# Patient Record
Sex: FEMALE | HISPANIC OR LATINO | Employment: FULL TIME | URBAN - METROPOLITAN AREA
[De-identification: names, ages, dates, MRNs, and addresses within clinical notes are randomized per-mention and may not be internally consistent; named-entity substitution may affect disease eponyms.]

---

## 2017-08-15 ENCOUNTER — HOSPITAL ENCOUNTER (EMERGENCY)
Facility: MEDICAL CENTER | Age: 37
End: 2017-08-15
Payer: COMMERCIAL

## 2017-10-26 ENCOUNTER — NON-PROVIDER VISIT (OUTPATIENT)
Dept: URGENT CARE | Facility: CLINIC | Age: 37
End: 2017-10-26

## 2017-10-26 DIAGNOSIS — Z02.1 PRE-EMPLOYMENT DRUG SCREENING: ICD-10-CM

## 2017-10-26 LAB
AMP AMPHETAMINE: NORMAL
BAR BARBITURATES: NORMAL
BZO BENZODIAZEPINES: NORMAL
COC COCAINE: NORMAL
INT CON NEG: NORMAL
INT CON POS: POSITIVE
MDMA ECSTASY: NORMAL
MET METHAMPHETAMINES: NORMAL
MTD METHADONE: NORMAL
OPI OPIATES: NORMAL
OXY OXYCODONE: NORMAL
PCP PHENCYCLIDINE: NORMAL
POC URINE DRUG SCREEN OCDRS: NEGATIVE
THC: NORMAL

## 2017-10-26 PROCEDURE — 80305 DRUG TEST PRSMV DIR OPT OBS: CPT | Performed by: NURSE PRACTITIONER

## 2018-01-04 ENCOUNTER — OFFICE VISIT (OUTPATIENT)
Dept: URGENT CARE | Facility: CLINIC | Age: 38
End: 2018-01-04
Payer: COMMERCIAL

## 2018-01-04 VITALS
DIASTOLIC BLOOD PRESSURE: 82 MMHG | SYSTOLIC BLOOD PRESSURE: 124 MMHG | BODY MASS INDEX: 34.96 KG/M2 | TEMPERATURE: 97.7 F | WEIGHT: 190 LBS | HEIGHT: 62 IN | OXYGEN SATURATION: 97 % | HEART RATE: 72 BPM | RESPIRATION RATE: 16 BRPM

## 2018-01-04 DIAGNOSIS — J40 BRONCHITIS: Primary | ICD-10-CM

## 2018-01-04 DIAGNOSIS — J01.40 ACUTE NON-RECURRENT PANSINUSITIS: ICD-10-CM

## 2018-01-04 DIAGNOSIS — J06.9 URI WITH COUGH AND CONGESTION: ICD-10-CM

## 2018-01-04 LAB
FLUAV+FLUBV AG SPEC QL IA: NEGATIVE
INT CON NEG: NORMAL
INT CON NEG: NORMAL
INT CON POS: NORMAL
INT CON POS: NORMAL
S PYO AG THROAT QL: NEGATIVE

## 2018-01-04 PROCEDURE — 87804 INFLUENZA ASSAY W/OPTIC: CPT | Performed by: PHYSICIAN ASSISTANT

## 2018-01-04 PROCEDURE — 99204 OFFICE O/P NEW MOD 45 MIN: CPT | Performed by: PHYSICIAN ASSISTANT

## 2018-01-04 PROCEDURE — 87880 STREP A ASSAY W/OPTIC: CPT | Performed by: PHYSICIAN ASSISTANT

## 2018-01-04 RX ORDER — DOXYCYCLINE HYCLATE 100 MG
100 TABLET ORAL 2 TIMES DAILY
Qty: 14 TAB | Refills: 0 | Status: SHIPPED | OUTPATIENT
Start: 2018-01-04 | End: 2018-01-11

## 2018-01-04 RX ORDER — PROMETHAZINE HYDROCHLORIDE AND CODEINE PHOSPHATE 6.25; 1 MG/5ML; MG/5ML
5 SYRUP ORAL 4 TIMES DAILY PRN
Qty: 240 ML | Refills: 0 | Status: SHIPPED | OUTPATIENT
Start: 2018-01-04 | End: 2018-01-18

## 2018-01-04 NOTE — LETTER
January 4, 2018       Patient: Mary Gold   YOB: 1980   Date of Visit: 1/4/2018         To Whom It May Concern:    It is my medical opinion that Mary Gold may be excused from work for the dates of 1/4/18-1/7/18.      If you have any questions or concerns, please don't hesitate to call [phone]          Sincerely,          Willy Bradford P.A.-C.  Electronically Signed

## 2018-01-05 NOTE — PATIENT INSTRUCTIONS
Acute Bronchitis  Bronchitis is inflammation of the airways that extend from the windpipe into the lungs (bronchi). The inflammation often causes mucus to develop. This leads to a cough, which is the most common symptom of bronchitis.   In acute bronchitis, the condition usually develops suddenly and goes away over time, usually in a couple weeks. Smoking, allergies, and asthma can make bronchitis worse. Repeated episodes of bronchitis may cause further lung problems.   CAUSES  Acute bronchitis is most often caused by the same virus that causes a cold. The virus can spread from person to person (contagious) through coughing, sneezing, and touching contaminated objects.  SIGNS AND SYMPTOMS   · Cough.    · Fever.    · Coughing up mucus.    · Body aches.    · Chest congestion.    · Chills.    · Shortness of breath.    · Sore throat.    DIAGNOSIS   Acute bronchitis is usually diagnosed through a physical exam. Your health care provider will also ask you questions about your medical history. Tests, such as chest X-rays, are sometimes done to rule out other conditions.   TREATMENT   Acute bronchitis usually goes away in a couple weeks. Oftentimes, no medical treatment is necessary. Medicines are sometimes given for relief of fever or cough. Antibiotic medicines are usually not needed but may be prescribed in certain situations. In some cases, an inhaler may be recommended to help reduce shortness of breath and control the cough. A cool mist vaporizer may also be used to help thin bronchial secretions and make it easier to clear the chest.   HOME CARE INSTRUCTIONS  · Get plenty of rest.    · Drink enough fluids to keep your urine clear or pale yellow (unless you have a medical condition that requires fluid restriction). Increasing fluids may help thin your respiratory secretions (sputum) and reduce chest congestion, and it will prevent dehydration.    · Take medicines only as directed by your health care provider.  · If  you were prescribed an antibiotic medicine, finish it all even if you start to feel better.  · Avoid smoking and secondhand smoke. Exposure to cigarette smoke or irritating chemicals will make bronchitis worse. If you are a smoker, consider using nicotine gum or skin patches to help control withdrawal symptoms. Quitting smoking will help your lungs heal faster.    · Reduce the chances of another bout of acute bronchitis by washing your hands frequently, avoiding people with cold symptoms, and trying not to touch your hands to your mouth, nose, or eyes.    · Keep all follow-up visits as directed by your health care provider.    SEEK MEDICAL CARE IF:  Your symptoms do not improve after 1 week of treatment.   SEEK IMMEDIATE MEDICAL CARE IF:  · You develop an increased fever or chills.    · You have chest pain.    · You have severe shortness of breath.  · You have bloody sputum.    · You develop dehydration.  · You faint or repeatedly feel like you are going to pass out.  · You develop repeated vomiting.  · You develop a severe headache.  MAKE SURE YOU:   · Understand these instructions.  · Will watch your condition.  · Will get help right away if you are not doing well or get worse.     This information is not intended to replace advice given to you by your health care provider. Make sure you discuss any questions you have with your health care provider.     Document Released: 01/25/2006 Document Revised: 01/08/2016 Document Reviewed: 06/10/2014  [employer] Patient Education ©2016 [employer]

## 2018-01-06 NOTE — PROGRESS NOTES
Subjective:      Pt is a 37 y.o. female who presents with Dizziness (sore throat, loss of voice, bodyaches )            HPI  PT presents to  clinic today complaining of sore throat, loss of voice, body aches, fevers, chills, watery eyes, pressure in ears, cough, fatigue, runny nose, wheezing, mild dizziness, and SOB. PT denies CP, NVD, abdominal pain, joint pain. PT states these symptoms began around 3-4 days ago. PT states the pain is a 7/10 with coughing fits, aching in nature and worse at night.  Pt has not taken any RX medications for this condition. The pt's medication list, problem list, and allergies have been evaluated and reviewed during today's visit.    PMH:  Past Medical History:   Diagnosis Date   • Chiari malformation    • Dizziness    • Fatigue    • Heart burn    • Indigestion    • Numbness and tingling in hands    • Other acute pain     headaches   • Unspecified hemorrhagic conditions        PSH:  Past Surgical History:   Procedure Laterality Date   • CRANIECTOMY  8/17/2015    Procedure: SUBOCCIPITAL CRANIECTOMY ;  Surgeon: Tenzin Curtis M.D.;  Location: SURGERY [facility];  Service:    • CERVICAL LAMINECTOMY POSTERIOR  8/17/2015    Procedure: CERVICAL LAMINECTOMY POSTERIOR C1, DUROPLASTY;  Surgeon: Tenzin Curtis M.D.;  Location: SURGERY [facility];  Service:    • GYN SURGERY     • OTHER      eye       Fam Hx:  the patient's family history is not pertinent to their current complaint      Soc HX:  Social History     Social History   • Marital status:      Spouse name: N/A   • Number of children: N/A   • Years of education: N/A     Occupational History   • Not on file.     Social History Main Topics   • Smoking status: Never Smoker   • Smokeless tobacco: Never Used   • Alcohol use Yes      Comment: occ   • Drug use: No   • Sexual activity: Not on file     Other Topics Concern   • Not on file     Social History Narrative    ** Merged History Encounter **               Medications:    Current Outpatient Prescriptions:   •  doxycycline (VIBRAMYCIN) 100 MG Tab, Take 1 Tab by mouth 2 times a day for 7 days., Disp: 14 Tab, Rfl: 0  •  promethazine-codeine (PHENERGAN-CODEINE) 6.25-10 MG/5ML Syrup, Take 5 mL by mouth 4 times a day as needed for up to 14 days., Disp: 240 mL, Rfl: 0  •  acetaminophen (TYLENOL) 325 MG Tab, Take 650 mg by mouth every four hours as needed for Mild Pain or Moderate Pain., Disp: , Rfl:   •  ondansetron (ZOFRAN) 4 MG Tab tablet, Take 4 mg by mouth every 6 hours as needed for Nausea/Vomiting., Disp: , Rfl:   •  methylPREDNISolone (MEDROL DOSPACK) 4 MG Tab, Take 4 mg by mouth every day. 6 day course. Take 6 tabs day one, then decrease by one tab daily until gone., Disp: , Rfl:   •  hydrocodone-acetaminophen (NORCO) 7.5-325 MG per tablet, Take 1-2 Tabs by mouth every 8 hours as needed (pain)., Disp: 20 Tab, Rfl: 0  •  ondansetron (ZOFRAN ODT) 4 MG TABLET DISPERSIBLE, Take 1 Tab by mouth every 8 hours as needed., Disp: 20 Tab, Rfl: 1  •  acetaminophen/caffeine/butalbital 325-40-50 mg (FIORICET) -40 MG Tab, Take 1 Tab by mouth every four hours as needed for Headache., Disp: 60 Tab, Rfl: 0      Allergies:  Patient has no known allergies.    ROS  Review of Systems   Constitutional: Positive for  malaise/fatigue. Negative for fever and diaphoresis.   HENT: Positive for congestion, ear pain and sore throat. Negative for ear discharge, hearing loss, nosebleeds and tinnitus.    Eyes: Negative for blurred vision, double vision and photophobia.   Respiratory: Positive for cough, sputum production, shortness of breath and wheezing. Negative for hemoptysis.    Cardiovascular: Negative for chest pain and palpitations.   Gastrointestinal: Negative for nausea, vomiting, abdominal pain, diarrhea and constipation.   Genitourinary: Negative for dysuria and flank pain.   Musculoskeletal: Negative for joint pain and myalgias.   Skin: Negative for itching and rash.  "  Neurological: Positive for headaches and dizziness. Negative for tingling and weakness.   Endo/Heme/Allergies: Does not bruise/bleed easily.   Psychiatric/Behavioral: Negative for depression. The patient is not nervous/anxious.             Objective:     /82   Pulse 72   Temp 36.5 °C (97.7 °F)   Resp 16   Ht 1.575 m (5' 2\")   Wt 86.2 kg (190 lb)   SpO2 97%   Breastfeeding? No   BMI 34.75 kg/m²      Physical Exam      Physical Exam   Constitutional: PT is oriented to person, place, and time. PT appears well-developed and well-nourished. No distress.   HENT:   Head: Normocephalic and atraumatic.   Right Ear: Hearing, tympanic membrane, external ear and ear canal normal.   Left Ear: Hearing, tympanic membrane, external ear and ear canal normal.   Nose: Mucosal edema, rhinorrhea and sinus tenderness present. Right sinus exhibits frontal sinus tenderness. Left sinus exhibits frontal sinus tenderness.   Mouth/Throat: Uvula is midline. Mucous membranes are pale. Posterior oropharyngeal edema and posterior oropharyngeal erythema present. No oropharyngeal exudate.   Eyes: Conjunctivae normal and EOM are normal. Pupils are equal, round, and reactive to light. Right eye exhibits no discharge. Left eye exhibits no discharge.   Neck: Normal range of motion. Neck supple. No thyromegaly present.   Cardiovascular: Normal rate, regular rhythm, normal heart sounds and intact distal pulses.  Exam reveals no gallop and no friction rub.    No murmur heard.  Pulmonary/Chest: Effort normal. No respiratory distress. PT has wheezes. PT has no rales. PT exhibits tenderness.   Abdominal: Soft. Bowel sounds are normal. PT exhibits no distension and no mass. There is no tenderness. There is no rebound and no guarding.   Musculoskeletal: Normal range of motion. PT exhibits no edema and no tenderness.   Lymphadenopathy:     PT has no cervical adenopathy.   Neurological: Pt is alert and oriented to person, place, and time. Pt has " normal reflexes. No cranial nerve deficit.   Skin: Skin is warm and dry. No rash noted. No erythema.   Psychiatric: PT has a normal mood and affect. Pt behavior is normal. Judgment and thought content normal.          Assessment/Plan:     1. Bronchitis    - doxycycline (VIBRAMYCIN) 100 MG Tab; Take 1 Tab by mouth 2 times a day for 7 days.  Dispense: 14 Tab; Refill: 0  - POCT Rapid Strep A-->NEG    2. Acute non-recurrent pansinusitis    - doxycycline (VIBRAMYCIN) 100 MG Tab; Take 1 Tab by mouth 2 times a day for 7 days.  Dispense: 14 Tab; Refill: 0    3. URI with cough and congestion    - POCT Influenza A/B-->NEG  - promethazine-codeine (PHENERGAN-CODEINE) 6.25-10 MG/5ML Syrup; Take 5 mL by mouth 4 times a day as needed for up to 14 days.  Dispense: 240 mL; Refill: 0  - POCT Rapid Strep A-->NEG    Rest, fluids encouraged.  OTC decongestant for congestion/cough  Note given for work.  AVS with medical info given.  Pt was in full understanding and agreement with the plan.  Follow-up as needed if symptoms worsen or fail to improve.

## 2018-02-21 ENCOUNTER — HOSPITAL ENCOUNTER (EMERGENCY)
Facility: MEDICAL CENTER | Age: 38
End: 2018-02-21
Attending: EMERGENCY MEDICINE

## 2018-02-21 ENCOUNTER — APPOINTMENT (OUTPATIENT)
Dept: RADIOLOGY | Facility: MEDICAL CENTER | Age: 38
End: 2018-02-21
Attending: EMERGENCY MEDICINE

## 2018-02-21 VITALS
RESPIRATION RATE: 16 BRPM | HEIGHT: 60 IN | SYSTOLIC BLOOD PRESSURE: 112 MMHG | WEIGHT: 195.77 LBS | BODY MASS INDEX: 38.43 KG/M2 | HEART RATE: 81 BPM | TEMPERATURE: 98.6 F | DIASTOLIC BLOOD PRESSURE: 74 MMHG | OXYGEN SATURATION: 96 %

## 2018-02-21 DIAGNOSIS — J98.8 VIRAL RESPIRATORY ILLNESS: ICD-10-CM

## 2018-02-21 DIAGNOSIS — B97.89 VIRAL RESPIRATORY ILLNESS: ICD-10-CM

## 2018-02-21 LAB
FLUAV+FLUBV AG SPEC QL IA: NORMAL
S PYO AG THROAT QL: NORMAL
SIGNIFICANT IND 70042: NORMAL
SIGNIFICANT IND 70042: NORMAL
SITE SITE: NORMAL
SITE SITE: NORMAL
SOURCE SOURCE: NORMAL
SOURCE SOURCE: NORMAL

## 2018-02-21 PROCEDURE — 87400 INFLUENZA A/B EACH AG IA: CPT

## 2018-02-21 PROCEDURE — A9270 NON-COVERED ITEM OR SERVICE: HCPCS | Performed by: EMERGENCY MEDICINE

## 2018-02-21 PROCEDURE — 99284 EMERGENCY DEPT VISIT MOD MDM: CPT

## 2018-02-21 PROCEDURE — 700102 HCHG RX REV CODE 250 W/ 637 OVERRIDE(OP): Performed by: EMERGENCY MEDICINE

## 2018-02-21 PROCEDURE — 71045 X-RAY EXAM CHEST 1 VIEW: CPT

## 2018-02-21 PROCEDURE — 87880 STREP A ASSAY W/OPTIC: CPT

## 2018-02-21 PROCEDURE — 87081 CULTURE SCREEN ONLY: CPT

## 2018-02-21 PROCEDURE — 700111 HCHG RX REV CODE 636 W/ 250 OVERRIDE (IP): Performed by: EMERGENCY MEDICINE

## 2018-02-21 RX ORDER — ALBUTEROL SULFATE 90 UG/1
2 AEROSOL, METERED RESPIRATORY (INHALATION) ONCE
Status: COMPLETED | OUTPATIENT
Start: 2018-02-21 | End: 2018-02-21

## 2018-02-21 RX ORDER — DEXAMETHASONE SODIUM PHOSPHATE 10 MG/ML
10 INJECTION, SOLUTION INTRAMUSCULAR; INTRAVENOUS ONCE
Status: COMPLETED | OUTPATIENT
Start: 2018-02-21 | End: 2018-02-21

## 2018-02-21 RX ADMIN — DEXAMETHASONE SODIUM PHOSPHATE 10 MG: 10 INJECTION, SOLUTION INTRAMUSCULAR; INTRAVENOUS at 21:57

## 2018-02-21 RX ADMIN — ALBUTEROL SULFATE 2 PUFF: 90 AEROSOL, METERED RESPIRATORY (INHALATION) at 22:15

## 2018-02-21 ASSESSMENT — PAIN SCALES - GENERAL: PAINLEVEL_OUTOF10: 0

## 2018-02-22 NOTE — ED NOTES
Written and verbal discharge instructions reviewed with pt and family, verbalized understanding. Vital signs WNL. Pt ambulated without difficulty accompanied by

## 2018-02-22 NOTE — ED NOTES
"Pt reports cough, congestion, \"hot sweats\", and fatigue x 2 months. Reports family members in the home recently ill with same symptoms. Reports symptoms worse in the last week. Pain to left ear, lungs cta. Aa0x3. resp nonlabored. Skin pwd.   "

## 2018-02-22 NOTE — ED PROVIDER NOTES
"ED Provider Note  CHIEF COMPLAINT  Chief Complaint   Patient presents with   • Flu Like Symptoms     for the last 2 months pt c/o HA, cough, fevers, Pt went to  on December and was diagnosed with \"bronchitis\" pt went home with antibiotics and cough medicine but states not feeling any better   • Cough     it does not let pt sleep   • Ear Pain     L ear for the last 3 days       HPI  Mary Gold is a 37 y.o. female who presents with flulike symptoms for the past 2 months that include a mild headache that presses on her temples and does not radiate and described as moderate. In addition the patient has a slight cough that is nonproductive and occasional fevers. She was seen at an urgent care in December and diagnosed with bronchitis and was sent home with antibiotics and cough medicine which did not help. The cough is making it difficult for the patient to sleep. She has been having left ear pain for the past 3 days that does not radiate and is described as mild and is associated with a sore throat. Patient says that she has no pain when moving her head around.    REVIEW OF SYSTEMS  See HPI for further details. All other systems are negative.     PAST MEDICAL HISTORY   has a past medical history of Chiari malformation; Dizziness; Fatigue; Heart burn; Indigestion; Numbness and tingling in hands; Other acute pain; and Unspecified hemorrhagic conditions.    SOCIAL HISTORY  Social History     Social History Main Topics   • Smoking status: Never Smoker   • Smokeless tobacco: Never Used   • Alcohol use Yes      Comment: occ   • Drug use: No   • Sexual activity: Not on file       SURGICAL HISTORY   has a past surgical history that includes other; craniectomy (8/17/2015); cervical laminectomy posterior (8/17/2015); and gyn surgery.    CURRENT MEDICATIONS  Home Medications     Reviewed by Steph Salcedo R.N. (Registered Nurse) on 02/21/18 at 1938  Med List Status: Complete   Medication Last Dose Status        Patient " Lc Taking any Medications                       ALLERGIES  No Known Allergies    PHYSICAL EXAM  VITAL SIGNS: /73   Pulse 82   Temp 36.3 °C (97.4 °F)   Resp 20   Ht 1.524 m (5')   Wt 88.8 kg (195 lb 12.3 oz)   LMP 01/24/2018   SpO2 96%   BMI 38.23 kg/m²  @NISHANT[[id number]::@  Pulse ox interpretation: I interpret this pulse ox as normal.  Constitutional: Alert in no apparent distress.  HENT: Normocephalic, Atraumatic, Bilateral external ears normal. Nose normal. Posterior oropharyngeal redness. Bilateral TMs normal. No meningismus and supple neck  Eyes: Pupils are equal and reactive. Conjunctiva normal, non-icteric.   Heart: Regular rate and rythm, no murmurs.    Lungs: Clear to auscultation bilaterally.  Skin: Warm, Dry, No erythema, No rash.   Neurologic: Alert, Grossly non-focal.   Psychiatric: Affect normal, Judgment normal, Mood normal, Appears appropriate and not intoxicated.     Labs Reviewed   INFLUENZA RAPID   RAPID STREP,CULT IF INDICATED   BETA STREP SCREEN (GP. A)           COURSE & MEDICAL DECISION MAKING  Pertinent Labs & Imaging studies reviewed. (See chart for details)    37 showed female who presents with viral-like symptoms. This point she has a clear exam of her chest therefore I do not believe an x-ray is indicated. We'll send an influenza as well as a rapid strep given the patient's symptoms. I will also treat her cough with albuterol and her sore throat with Decadron. There is no evidence to suggest a retropharyngeal abscess or peritonsillar abscess. Patient does not look systemically ill at this time and has no vital sign temperature patient.    The patient was found to be negative for flu and negative for strep. The inhaler has helped her significantly and the Decadron is also helped her sore throat. Strict return precautions were given to the patient and follow-up was arranged.    The patient will not drink alcohol nor drive with prescribed medications. The patient will return for  worsening symptoms and is stable at the time of discharge. The patient verbalizes understanding and will comply.    FINAL IMPRESSION  1. Viral respiratory illness              Electronically signed by: Oziel Cyr, 2/21/2018 9:44 PM

## 2018-02-22 NOTE — DISCHARGE INSTRUCTIONS

## 2018-02-22 NOTE — ED NOTES
".  Chief Complaint   Patient presents with   • Flu Like Symptoms     for the last 2 months pt c/o HA, cough, fevers, Pt went to  on December and was diagnosed with \"bronchitis\" pt went home with antibiotics and cough medicine but states not feeling any better   • Cough     it does not let pt sleep   • Ear Pain     L ear for the last 3 days     .Blood pressure 116/73, pulse 82, temperature 36.3 °C (97.4 °F), resp. rate 20, height 1.524 m (5'), weight 88.8 kg (195 lb 12.3 oz), SpO2 96 %.      "

## 2018-02-24 LAB
S PYO SPEC QL CULT: NORMAL
SIGNIFICANT IND 70042: NORMAL
SITE SITE: NORMAL
SOURCE SOURCE: NORMAL

## 2019-01-30 ENCOUNTER — OFFICE VISIT (OUTPATIENT)
Dept: URGENT CARE | Facility: CLINIC | Age: 39
End: 2019-01-30
Payer: COMMERCIAL

## 2019-01-30 VITALS
OXYGEN SATURATION: 98 % | HEIGHT: 63 IN | WEIGHT: 188 LBS | SYSTOLIC BLOOD PRESSURE: 112 MMHG | DIASTOLIC BLOOD PRESSURE: 70 MMHG | TEMPERATURE: 97.5 F | RESPIRATION RATE: 20 BRPM | BODY MASS INDEX: 33.31 KG/M2 | HEART RATE: 72 BPM

## 2019-01-30 DIAGNOSIS — R42 VERTIGO: ICD-10-CM

## 2019-01-30 DIAGNOSIS — R11.0 NAUSEA: ICD-10-CM

## 2019-01-30 PROCEDURE — 99214 OFFICE O/P EST MOD 30 MIN: CPT | Performed by: NURSE PRACTITIONER

## 2019-01-30 RX ORDER — ONDANSETRON 4 MG/1
4 TABLET, ORALLY DISINTEGRATING ORAL EVERY 8 HOURS PRN
Qty: 10 TAB | Refills: 0 | Status: SHIPPED | OUTPATIENT
Start: 2019-01-30 | End: 2019-04-07

## 2019-01-30 RX ORDER — MECLIZINE HYDROCHLORIDE 25 MG/1
25 TABLET ORAL 3 TIMES DAILY PRN
Qty: 30 TAB | Refills: 0 | Status: SHIPPED | OUTPATIENT
Start: 2019-01-30 | End: 2019-04-07

## 2019-01-30 RX ORDER — ONDANSETRON 4 MG/1
4 TABLET, ORALLY DISINTEGRATING ORAL ONCE
Status: COMPLETED | OUTPATIENT
Start: 2019-01-30 | End: 2019-01-30

## 2019-01-30 RX ADMIN — ONDANSETRON 4 MG: 4 TABLET, ORALLY DISINTEGRATING ORAL at 15:19

## 2019-01-30 ASSESSMENT — ENCOUNTER SYMPTOMS
MYALGIAS: 0
CHILLS: 0
VOMITING: 0
FATIGUE: 0
SHORTNESS OF BREATH: 0
DIZZINESS: 1
HEADACHES: 0
NUMBNESS: 0
NAUSEA: 1
ABDOMINAL PAIN: 0
WEAKNESS: 1
FEVER: 0
SORE THROAT: 0
VERTIGO: 1
EYE PAIN: 0

## 2019-01-30 NOTE — LETTER
January 30, 2019         Patient: Mary Gold   YOB: 1980   Date of Visit: 1/30/2019           To Whom it May Concern:    Mary Gold was seen in my clinic on 1/30/2019. She may return to work on 1/31/19.    If you have any questions or concerns, please don't hesitate to call.        Sincerely,           RUDI Ruffin.  Electronically Signed

## 2019-01-30 NOTE — PATIENT INSTRUCTIONS
Vertigo  Introduction  Vertigo means that you feel like you are moving when you are not. Vertigo can also make you feel like things around you are moving when they are not. This feeling can come and go at any time. Vertigo often goes away on its own. Epley maneuver   Follow these instructions at home:  · Avoid making fast movements.  · Avoid driving.  · Avoid using heavy machinery.  · Avoid doing any task or activity that might cause danger to you or other people if you would have a vertigo attack while you are doing it.  · Sit down right away if you feel dizzy or have trouble with your balance.  · Take over-the-counter and prescription medicines only as told by your doctor.  · Follow instructions from your doctor about which positions or movements you should avoid.  · Drink enough fluid to keep your pee (urine) clear or pale yellow.  · Keep all follow-up visits as told by your doctor. This is important.  Contact a doctor if:  · Medicine does not help your vertigo.  · You have a fever.  · Your problems get worse or you have new symptoms.  · Your family or friends see changes in your behavior.  · You feel sick to your stomach (nauseous) or you throw up (vomit).  · You have a “pins and needles” feeling or you are numb in part of your body.  Get help right away if:  · You have trouble moving or talking.  · You are always dizzy.  · You pass out (faint).  · You get very bad headaches.  · You feel weak or have trouble using your hands, arms, or legs.  · You have changes in your hearing.  · You have changes in your seeing (vision).  · You get a stiff neck.  · Bright light starts to bother you.  This information is not intended to replace advice given to you by your health care provider. Make sure you discuss any questions you have with your health care provider.  Document Released: 09/26/2009 Document Revised: 05/25/2017 Document Reviewed: 04/11/2016  © 2017 [employer]

## 2019-01-30 NOTE — PROGRESS NOTES
"Subjective:   Mary Gold is a 38 y.o. female who presents for Dizziness (Couple dasy loss of balance, weakness)  Patient is a [age]-year-old female presents clinic for evaluation of dizziness, feeling weak, and his room is spinning with any head-like movement times 2-3 days.  Patient does have a history of vertigo.  Patient does feel nauseous however has not vomited.  She denies any acute headache, head trauma.  Patient denies any chest pain, palpitation, shortness of breath.  Patient has attempted nothing for the symptoms.  She denies any fevers, chills, body aches, malaise.       Dizziness    This is a new problem. Episode onset: 2-3 days.  The problem occurs constantly. The problem has been unchanged. Associated symptoms include nausea, vertigo and weakness. Pertinent negatives include no abdominal pain, chest pain, chills, congestion, fatigue, fever, headaches, myalgias, numbness, rash, sore throat or vomiting. Exacerbated by: movement.  She has tried nothing for the symptoms. The treatment provided no relief.     Review of Systems   Constitutional: Negative for chills, fatigue and fever.   HENT: Negative for congestion and sore throat.    Eyes: Negative for pain.   Respiratory: Negative for shortness of breath.    Cardiovascular: Negative for chest pain.   Gastrointestinal: Positive for nausea. Negative for abdominal pain and vomiting.   Genitourinary: Negative for hematuria.   Musculoskeletal: Negative for myalgias.   Skin: Negative for rash.   Neurological: Positive for dizziness, vertigo and weakness. Negative for numbness and headaches.     No Known Allergies   Objective:   /70 (BP Location: Right arm, Patient Position: Sitting, BP Cuff Size: Adult)   Pulse 72   Temp 36.4 °C (97.5 °F) (Temporal)   Resp 20   Ht 1.6 m (5' 3\")   Wt 85.3 kg (188 lb)   LMP 01/02/2019 (Exact Date)   SpO2 98%   Breastfeeding? No   BMI 33.30 kg/m²    Physical Exam   Constitutional: She is oriented to person, place, " and time. She appears well-developed and well-nourished. No distress.   HENT:   Head: Normocephalic and atraumatic.   Right Ear: Tympanic membrane normal.   Left Ear: Tympanic membrane normal.   Nose: Nose normal. Right sinus exhibits no maxillary sinus tenderness and no frontal sinus tenderness. Left sinus exhibits no maxillary sinus tenderness and no frontal sinus tenderness.   Mouth/Throat: Uvula is midline, oropharynx is clear and moist and mucous membranes are normal. No posterior oropharyngeal edema, posterior oropharyngeal erythema or tonsillar abscesses. No tonsillar exudate.   Eyes: Pupils are equal, round, and reactive to light. Conjunctivae, EOM and lids are normal. Right eye exhibits no discharge. Left eye exhibits no discharge.   danyelle-mcnamara pike positive      Cardiovascular: Normal rate and regular rhythm.    No murmur heard.  Pulmonary/Chest: Effort normal and breath sounds normal. No respiratory distress.   Abdominal: Soft. She exhibits no distension. There is no tenderness.   Neurological: She is alert and oriented to person, place, and time. She has normal reflexes. She is not disoriented. No cranial nerve deficit or sensory deficit. GCS eye subscore is 4. GCS verbal subscore is 5. GCS motor subscore is 6.   Skin: Skin is warm, dry and intact.   Psychiatric: She has a normal mood and affect.         Assessment/Plan:     1. Vertigo  meclizine (ANTIVERT) 25 MG Tab    ondansetron (ZOFRAN ODT) 4 MG TABLET DISPERSIBLE    ondansetron (ZOFRAN ODT) dispertab 4 mg   2. Nausea       Patient is a 38 female who appears to have vertigo.  Symptoms are exacerbated with any head-like movement as the room feels as if it spinning spinning.  Hadley-Hallpike positive on exam.  Discussed Epley maneuver, education provided.  Patient given precautionary s/sx that mandate immediate follow up and evaluation in the ED. Advised of risks of not doing so.    DDX, Supportive care, and indications for immediate follow-up discussed  with patient.    Instructed to return to clinic or nearest emergency department if we are not available for any change in condition, further concerns, or worsening of symptoms.    The patient demonstrated a good understanding and agreed with the treatment plan.

## 2019-04-01 ENCOUNTER — OFFICE VISIT (OUTPATIENT)
Dept: URGENT CARE | Facility: CLINIC | Age: 39
End: 2019-04-01
Payer: COMMERCIAL

## 2019-04-01 VITALS
WEIGHT: 180 LBS | BODY MASS INDEX: 33.13 KG/M2 | DIASTOLIC BLOOD PRESSURE: 62 MMHG | HEIGHT: 62 IN | TEMPERATURE: 97.6 F | OXYGEN SATURATION: 97 % | HEART RATE: 76 BPM | RESPIRATION RATE: 18 BRPM | SYSTOLIC BLOOD PRESSURE: 98 MMHG

## 2019-04-01 DIAGNOSIS — J02.0 STREP PHARYNGITIS: ICD-10-CM

## 2019-04-01 DIAGNOSIS — Z00.00 HEALTHCARE MAINTENANCE: ICD-10-CM

## 2019-04-01 LAB
FLUAV+FLUBV AG SPEC QL IA: NORMAL
INT CON NEG: NEGATIVE
INT CON NEG: NEGATIVE
INT CON POS: POSITIVE
INT CON POS: POSITIVE
S PYO AG THROAT QL: NORMAL

## 2019-04-01 PROCEDURE — 99214 OFFICE O/P EST MOD 30 MIN: CPT | Performed by: PHYSICIAN ASSISTANT

## 2019-04-01 PROCEDURE — 87804 INFLUENZA ASSAY W/OPTIC: CPT | Performed by: PHYSICIAN ASSISTANT

## 2019-04-01 PROCEDURE — 87880 STREP A ASSAY W/OPTIC: CPT | Performed by: PHYSICIAN ASSISTANT

## 2019-04-01 RX ORDER — BENZONATATE 100 MG/1
100 CAPSULE ORAL 3 TIMES DAILY PRN
Qty: 30 CAP | Refills: 0 | Status: SHIPPED | OUTPATIENT
Start: 2019-04-01 | End: 2019-04-07

## 2019-04-01 RX ORDER — NAPROXEN SODIUM 220 MG
220 TABLET ORAL 2 TIMES DAILY WITH MEALS
COMMUNITY
End: 2019-04-11

## 2019-04-01 RX ORDER — AMOXICILLIN 875 MG/1
875 TABLET, COATED ORAL 2 TIMES DAILY
Qty: 14 TAB | Refills: 0 | Status: SHIPPED | OUTPATIENT
Start: 2019-04-01 | End: 2019-04-07

## 2019-04-01 ASSESSMENT — PAIN SCALES - GENERAL: PAINLEVEL: 6=MODERATE PAIN

## 2019-04-01 ASSESSMENT — ENCOUNTER SYMPTOMS
SORE THROAT: 1
SHORTNESS OF BREATH: 0
COUGH: 1
WEAKNESS: 1
CHILLS: 1
HEADACHES: 1
MYALGIAS: 1
FEVER: 1

## 2019-04-01 NOTE — LETTER
[facility] ServicesADALBERTO  [facility]  [address]  [place] [address]     April 1, 2019    Patient: Mary Gold   YOB: 1980   Date of Visit: 4/1/2019       To Whom It May Concern:    Mary Gold was seen and treated in our department on 4/1/2019.     Sincerely,     Kale Go, Med Ass't

## 2019-04-01 NOTE — LETTER
April 1, 2019         Patient: Mary Gold   YOB: 1980   Date of Visit: 4/1/2019           To Whom it May Concern:    Mary Gold was seen in my clinic on 4/1/2019. She can return to work 4/4/19.   Please excuse her recent absence.     If you have any questions or concerns, please don't hesitate to call.        Sincerely,           Inder Mcneil  Electronically Signed

## 2019-04-01 NOTE — PROGRESS NOTES
Subjective:   Mary Gold is a 38 y.o. female who presents today with   Chief Complaint   Patient presents with   • Sore Throat     x3 days   • Otalgia     x1 day   • Cough     x3 days   • Fatigue   • Chills       Cough   This is a new problem. Episode onset: 2 days. The problem has been unchanged. The problem occurs every few minutes. The cough is productive of sputum. Associated symptoms include chills, ear congestion, a fever, headaches, myalgias, nasal congestion and a sore throat. Pertinent negatives include no shortness of breath. Treatments tried: OTC cold and flu medicine.   Patient is also interested in following up with OBGYN due to recent spotting prior to her period. And she has been having back pain with lifting at her job. She would like a referral for this at this time as well.     PMH:  has a past medical history of Chiari malformation; Dizziness; Fatigue; Heart burn; Indigestion; Numbness and tingling in hands; Other acute pain; and Unspecified hemorrhagic conditions. She also has no past medical history of COPD or Psychiatric disorder.  MEDS:   Current Outpatient Prescriptions:   •  naproxen (FLANAX PAIN RELIEF) 220 MG tablet, Take 220 mg by mouth 2 times a day, with meals., Disp: , Rfl:   •  benzonatate (TESSALON) 100 MG Cap, Take 1 Cap by mouth 3 times a day as needed for Cough., Disp: 30 Cap, Rfl: 0  •  amoxicillin (AMOXIL) 875 MG tablet, Take 1 Tab by mouth 2 times a day for 7 days., Disp: 14 Tab, Rfl: 0  •  meclizine (ANTIVERT) 25 MG Tab, Take 1 Tab by mouth 3 times a day as needed for Vertigo. (Patient not taking: Reported on 4/1/2019), Disp: 30 Tab, Rfl: 0  •  ondansetron (ZOFRAN ODT) 4 MG TABLET DISPERSIBLE, Take 1 Tab by mouth every 8 hours as needed. (Patient not taking: Reported on 4/1/2019), Disp: 10 Tab, Rfl: 0  ALLERGIES: No Known Allergies  SURGHX:   Past Surgical History:   Procedure Laterality Date   • CRANIECTOMY  8/17/2015    Procedure: SUBOCCIPITAL CRANIECTOMY ;  Surgeon:  "Tenzin Curtis M.D.;  Location: SURGERY [facility];  Service:    • CERVICAL LAMINECTOMY POSTERIOR  8/17/2015    Procedure: CERVICAL LAMINECTOMY POSTERIOR C1, DUROPLASTY;  Surgeon: Tenzin Curtis M.D.;  Location: SURGERY [facility];  Service:    • GYN SURGERY     • OTHER      eye       SOCHX:  reports that she has never smoked. She has never used smokeless tobacco. She reports that she does not drink alcohol or use drugs.  FH: Reviewed with patient, not pertinent to this visit.       Review of Systems   Constitutional: Positive for chills, fever and malaise/fatigue.   HENT: Positive for sore throat.    Respiratory: Positive for cough. Negative for shortness of breath.    Musculoskeletal: Positive for myalgias.   Neurological: Positive for weakness and headaches.   All other systems reviewed and are negative.       Objective:   BP (!) 98/62 (BP Location: Right arm, Patient Position: Sitting, BP Cuff Size: Adult long)   Pulse 76   Temp 36.4 °C (97.6 °F) (Temporal)   Resp 18   Ht 1.575 m (5' 2\")   Wt 81.6 kg (180 lb)   LMP 02/23/2019 (Exact Date)   SpO2 97%   Breastfeeding? No   BMI 32.92 kg/m²   Physical Exam   Constitutional: She appears well-developed and well-nourished. No distress.   HENT:   Head: Normocephalic and atraumatic.   Mouth/Throat: Posterior oropharyngeal edema and posterior oropharyngeal erythema present.   Eyes: Pupils are equal, round, and reactive to light.   Neck: Neck supple.   Cardiovascular: Normal rate, regular rhythm and normal heart sounds.    Pulmonary/Chest: Effort normal and breath sounds normal.   Musculoskeletal:   Normal movement in all 4 extremities   Lymphadenopathy:     She has cervical adenopathy.        Right cervical: Superficial cervical adenopathy present.        Left cervical: Superficial cervical adenopathy present.   Neurological: She is alert. Coordination normal.   Skin: Skin is warm and dry.   Psychiatric: She has a normal mood and affect.   Nursing " note and vitals reviewed.    STREP POS  FLU NEG    Assessment/Plan:   Assessment    1. Strep pharyngitis  - POCT Influenza A/B  - benzonatate (TESSALON) 100 MG Cap; Take 1 Cap by mouth 3 times a day as needed for Cough.  Dispense: 30 Cap; Refill: 0  - amoxicillin (AMOXIL) 875 MG tablet; Take 1 Tab by mouth 2 times a day for 7 days.  Dispense: 14 Tab; Refill: 0  - POCT Rapid Strep A    2. Healthcare maintenance  - REFERRAL TO OB/GYN  - REFERRAL TO SPINE SURGERY    Other orders  - naproxen (FLANAX PAIN RELIEF) 220 MG tablet; Take 220 mg by mouth 2 times a day, with meals.  Encouraged patient to try OTC lozenges and saltwater gargles.   Differential diagnosis, natural history, supportive care, and indications for immediate follow-up discussed.   Patient given instructions and understanding of medications and treatment.    If not improving in 3-5 days, F/U with PCP or return to  if symptoms worsen.    Patient agreeable to plan.      Inder Mcneil PA-C

## 2019-04-05 ENCOUNTER — HOSPITAL ENCOUNTER (EMERGENCY)
Dept: HOSPITAL 8 - ED | Age: 39
Discharge: HOME | End: 2019-04-05
Payer: COMMERCIAL

## 2019-04-05 VITALS — BODY MASS INDEX: 33.55 KG/M2 | HEIGHT: 62 IN | WEIGHT: 182.32 LBS

## 2019-04-05 VITALS — DIASTOLIC BLOOD PRESSURE: 71 MMHG | SYSTOLIC BLOOD PRESSURE: 115 MMHG

## 2019-04-05 DIAGNOSIS — Z77.22: ICD-10-CM

## 2019-04-05 DIAGNOSIS — H65.02: Primary | ICD-10-CM

## 2019-04-05 PROCEDURE — 99283 EMERGENCY DEPT VISIT LOW MDM: CPT

## 2019-04-07 ENCOUNTER — OFFICE VISIT (OUTPATIENT)
Dept: URGENT CARE | Facility: MEDICAL CENTER | Age: 39
End: 2019-04-07
Payer: COMMERCIAL

## 2019-04-07 VITALS
DIASTOLIC BLOOD PRESSURE: 70 MMHG | OXYGEN SATURATION: 97 % | SYSTOLIC BLOOD PRESSURE: 108 MMHG | HEIGHT: 62 IN | TEMPERATURE: 97.6 F | RESPIRATION RATE: 16 BRPM | WEIGHT: 180 LBS | HEART RATE: 80 BPM | BODY MASS INDEX: 33.13 KG/M2

## 2019-04-07 DIAGNOSIS — J02.0 STREP THROAT: ICD-10-CM

## 2019-04-07 DIAGNOSIS — H66.012 ACUTE SUPPURATIVE OTITIS MEDIA OF LEFT EAR WITH SPONTANEOUS RUPTURE OF TYMPANIC MEMBRANE, RECURRENCE NOT SPECIFIED: Primary | ICD-10-CM

## 2019-04-07 PROCEDURE — 99214 OFFICE O/P EST MOD 30 MIN: CPT | Performed by: PHYSICIAN ASSISTANT

## 2019-04-07 RX ORDER — METHYLPREDNISOLONE 4 MG/1
TABLET ORAL
Qty: 21 TAB | Refills: 0 | Status: SHIPPED | OUTPATIENT
Start: 2019-04-07 | End: 2019-04-11

## 2019-04-07 RX ORDER — CLINDAMYCIN HYDROCHLORIDE 300 MG/1
300 CAPSULE ORAL 3 TIMES DAILY
Qty: 30 CAP | Refills: 0 | Status: SHIPPED | OUTPATIENT
Start: 2019-04-07 | End: 2019-04-11

## 2019-04-07 NOTE — LETTER
April 7, 2019       Patient: Mary Gold   YOB: 1980   Date of Visit: 4/7/2019         To Whom It May Concern:    It is my medical opinion that Mary Gold may be excused from work for the dates of 4/7/19-4/11/19.      If you have any questions or concerns, please don't hesitate to call [phone]          Sincerely,          Willy Bradford P.A.-C.  Electronically Signed

## 2019-04-08 NOTE — PROGRESS NOTES
Subjective:      Pt is a 38 y.o. female who presents with Otalgia ((L) x 3 days, pressure, bleeding, dizzyness)            HPI  This is a new problem. Pt was diagnosed with strep throat 6 days ago and placed on amoxicillin which she notes is not helping her throat and now has left ear pain x 3 days. PT presents to  clinic today complaining of sore throat, pressure in ears, ear drainage on left and mild dizziness. PT denies CP, SOB, NVD, abdominal pain, joint pain. PT states these symptoms began around 3 days ago. PT states the pain is a 7/10 with left ear pain, aching in nature and worse at night.  Pt has not taken any RX medications for this condition. The pt's medication list, problem list, and allergies have been evaluated and reviewed during today's visit.      PMH:  Past Medical History:   Diagnosis Date   • Chiari malformation    • Dizziness    • Fatigue    • Heart burn    • Indigestion    • Numbness and tingling in hands    • Other acute pain     headaches   • Unspecified hemorrhagic conditions        PSH:  Past Surgical History:   Procedure Laterality Date   • CRANIECTOMY  8/17/2015    Procedure: SUBOCCIPITAL CRANIECTOMY ;  Surgeon: Tenzin Curtis M.D.;  Location: SURGERY [facility];  Service:    • CERVICAL LAMINECTOMY POSTERIOR  8/17/2015    Procedure: CERVICAL LAMINECTOMY POSTERIOR C1, DUROPLASTY;  Surgeon: Tenzin Curtis M.D.;  Location: SURGERY [facility];  Service:    • GYN SURGERY     • OTHER      eye       Fam Hx:  the patient's family history is not pertinent to their current complaint      Soc HX:  Social History     Social History   • Marital status:      Spouse name: N/A   • Number of children: N/A   • Years of education: N/A     Occupational History   • Not on file.     Social History Main Topics   • Smoking status: Never Smoker   • Smokeless tobacco: Never Used   • Alcohol use No      Comment: occ   • Drug use: No   • Sexual activity: Not on file     Other Topics Concern   •  "Not on file     Social History Narrative    ** Merged History Encounter **              Medications:    Current Outpatient Prescriptions:   •  clindamycin (CLEOCIN) 300 MG Cap, Take 1 Cap by mouth 3 times a day for 10 days., Disp: 30 Cap, Rfl: 0  •  MethylPREDNISolone (MEDROL DOSEPAK) 4 MG Tablet Therapy Pack, Use as directed, Disp: 21 Tab, Rfl: 0  •  naproxen (FLANAX PAIN RELIEF) 220 MG tablet, Take 220 mg by mouth 2 times a day, with meals., Disp: , Rfl:       Allergies:  Patient has no known allergies.    ROS  Constitutional: Positive for malaise/fatigue.   HENT: Positive for congestion and sore throat. POS for LEFT ear pain.    Eyes: Negative for blurred vision, double vision and photophobia.   Respiratory:  Negative for cough, shortness of breath and wheezing.    Cardiovascular: Negative for chest pain and palpitations.   Gastrointestinal: Negative for nausea, vomiting, abdominal pain, diarrhea and constipation.   Genitourinary: Negative for dysuria and flank pain.   Musculoskeletal: Negative for falls and myalgias.   Skin: Negative for itching and rash.   Neurological: Negative for dizziness and tingling.   Endo/Heme/Allergies: Does not bruise/bleed easily.   Psychiatric/Behavioral: Negative for depression. The patient is not nervous/anxious.             Objective:     /70   Pulse 80   Temp 36.4 °C (97.6 °F)   Resp 16   Ht 1.575 m (5' 2\")   Wt 81.6 kg (180 lb)   SpO2 97%   BMI 32.92 kg/m²      Physical Exam      Constitutional: PT is oriented to person, place, and time. PT appears well-developed and well-nourished. No distress.   HENT:   Head: Normocephalic and atraumatic.   Right Ear: Hearing, tympanic membrane, external ear and ear canal normal.   Left Ear: Hearing, external ear and ear canal normal. Tympanic membrane is erythematous and bulging. A middle ear effusion is present.   Nose: Mucosal edema, rhinorrhea and sinus tenderness present. Right sinus exhibits frontal sinus tenderness. Left " sinus exhibits frontal sinus tenderness.   Mouth/Throat: Uvula is midline. Mucous membranes are pale. Posterior oropharyngeal edema and posterior oropharyngeal erythema with exudate noted on exam.   Eyes: Conjunctivae normal and EOM are normal. Pupils are equal, round, and reactive to light.   Neck: Normal range of motion. Neck supple. No thyromegaly present.   Cardiovascular: Normal rate, regular rhythm, normal heart sounds and intact distal pulses.  Exam reveals no gallop and no friction rub.    No murmur heard.  Pulmonary/Chest: Effort normal and breath sounds normal. No respiratory distress. PT has no wheezes. PT has no rales. PT exhibits no tenderness.   Abdominal: Soft. Bowel sounds are normal. PT exhibits no distension and no mass. There is no tenderness. There is no rebound and no guarding.   Musculoskeletal: Normal range of motion. PT exhibits no edema and no tenderness.   Lymphadenopathy:     PT has no cervical adenopathy.   Neurological: PT is alert and oriented to person, place, and time. PT displays normal reflexes. No cranial nerve deficit. PT exhibits normal muscle tone. Coordination normal.   Skin: Skin is warm and dry. No rash noted. No erythema.   Psychiatric: PT has a normal mood and affect. PT behavior is normal. Judgment and thought content normal.          Assessment/Plan:     1. Acute suppurative otitis media of left ear with spontaneous rupture of tympanic membrane, recurrence not specified    - clindamycin (CLEOCIN) 300 MG Cap; Take 1 Cap by mouth 3 times a day for 10 days.  Dispense: 30 Cap; Refill: 0  - MethylPREDNISolone (MEDROL DOSEPAK) 4 MG Tablet Therapy Pack; Use as directed  Dispense: 21 Tab; Refill: 0    2. Strep throat    - clindamycin (CLEOCIN) 300 MG Cap; Take 1 Cap by mouth 3 times a day for 10 days.  Dispense: 30 Cap; Refill: 0    Rest, fluids encouraged.  OTC decongestant for congestion  Note given for work.  AVS with medical info given.  Pt was in full understanding and  agreement with the plan.  Differential diagnosis, natural history, supportive care, and indications for immediate follow-up discussed. All questions answered. Patient agrees with the plan of care.  Follow-up as needed if symptoms worsen or fail to improve.

## 2019-04-11 ENCOUNTER — OFFICE VISIT (OUTPATIENT)
Dept: URGENT CARE | Facility: MEDICAL CENTER | Age: 39
End: 2019-04-11
Payer: COMMERCIAL

## 2019-04-11 VITALS
SYSTOLIC BLOOD PRESSURE: 116 MMHG | HEIGHT: 62 IN | WEIGHT: 184.6 LBS | DIASTOLIC BLOOD PRESSURE: 66 MMHG | HEART RATE: 94 BPM | BODY MASS INDEX: 33.97 KG/M2 | OXYGEN SATURATION: 96 % | TEMPERATURE: 98.1 F

## 2019-04-11 DIAGNOSIS — H93.12 RINGING IN EAR, LEFT: ICD-10-CM

## 2019-04-11 DIAGNOSIS — H66.90 ACUTE OTITIS MEDIA, UNSPECIFIED OTITIS MEDIA TYPE: ICD-10-CM

## 2019-04-11 DIAGNOSIS — R26.89 BALANCE PROBLEM: ICD-10-CM

## 2019-04-11 DIAGNOSIS — H91.92 HEARING LOSS OF LEFT EAR, UNSPECIFIED HEARING LOSS TYPE: ICD-10-CM

## 2019-04-11 DIAGNOSIS — R51.9 NONINTRACTABLE HEADACHE, UNSPECIFIED CHRONICITY PATTERN, UNSPECIFIED HEADACHE TYPE: ICD-10-CM

## 2019-04-11 PROCEDURE — 99214 OFFICE O/P EST MOD 30 MIN: CPT | Performed by: FAMILY MEDICINE

## 2019-04-11 RX ORDER — KETOROLAC TROMETHAMINE 30 MG/ML
60 INJECTION, SOLUTION INTRAMUSCULAR; INTRAVENOUS ONCE
Status: COMPLETED | OUTPATIENT
Start: 2019-04-11 | End: 2019-04-11

## 2019-04-11 RX ORDER — AMOXICILLIN AND CLAVULANATE POTASSIUM 875; 125 MG/1; MG/1
1 TABLET, FILM COATED ORAL 2 TIMES DAILY
Qty: 20 TAB | Refills: 0 | Status: SHIPPED | OUTPATIENT
Start: 2019-04-11 | End: 2019-04-21

## 2019-04-11 RX ORDER — CIPROFLOXACIN AND DEXAMETHASONE 3; 1 MG/ML; MG/ML
4 SUSPENSION/ DROPS AURICULAR (OTIC) 2 TIMES DAILY
Qty: 1 BOTTLE | Refills: 0 | Status: SHIPPED | OUTPATIENT
Start: 2019-04-11 | End: 2019-05-17

## 2019-04-11 RX ADMIN — KETOROLAC TROMETHAMINE 60 MG: 30 INJECTION, SOLUTION INTRAMUSCULAR; INTRAVENOUS at 19:16

## 2019-04-12 NOTE — PROGRESS NOTES
"Subjective:      Mary Gold is a 38 y.o. female who presents with Otalgia (infection, prev step diagnosis, lots of pressure, prev ear bleeding, dizzy, headaches, weakness)            This is a follow-up problem.  Patient has been dealing with left otitis media since beginning of April first, where she was seen first and started on amoxicillin.  Couple of days after she started the amoxicillin she started having bleeding from the ear, and that made her go to [place] emergency department on April 3 where she was seen.  She was then started on amoxicillin.  5 days later (April 7)  she was seen in the urgent care for continued pain in she was started on clindamycin and Medrol Dosepak and she has been on those for the past 5 days and there has been no improvement.  She denies any fever or chills.  She has had some headache intermittently.  She has not much hearing on the left side, has intermittent intense ringing sensation and also has noticed trouble with her balance.  She denies any change in vision, paresthesias or muscle weakness.  She has not taken any over-the-counter medication for headache.  She denies any sound or light sensitivity.          Review of Systems   All other systems reviewed and are negative.         Objective:     /66   Pulse 94   Temp 36.7 °C (98.1 °F) (Temporal)   Ht 1.575 m (5' 2\")   Wt 83.7 kg (184 lb 9.6 oz)   SpO2 96%   BMI 33.76 kg/m²      Physical Exam   Constitutional: She is oriented to person, place, and time. She appears well-developed and well-nourished.  Non-toxic appearance. No distress.   HENT:   Head: Normocephalic and atraumatic.   Right Ear: Tympanic membrane, external ear and ear canal normal.   Left Ear: External ear normal. There is swelling and tenderness. No drainage. No mastoid tenderness. Tympanic membrane is retracted. Tympanic membrane mobility is abnormal. Decreased hearing is noted.   Nose: Nose normal.   Mouth/Throat: Oropharynx is clear and moist. " No oropharyngeal exudate.   Eyes: Pupils are equal, round, and reactive to light. Conjunctivae and EOM are normal. No scleral icterus.   I did not appreciate any nystagmus   Neck: Neck supple.   Cardiovascular: Normal rate and regular rhythm.  Exam reveals no gallop and no friction rub.    No murmur heard.  Pulmonary/Chest: Effort normal. No stridor. No respiratory distress. She has no wheezes. She has no rales.   Lymphadenopathy:     She has no cervical adenopathy.   Neurological: She is alert and oriented to person, place, and time. She has normal strength. No cranial nerve deficit or sensory deficit.   Grossly normal otherwise   Skin: Skin is warm. No rash noted. She is not diaphoretic. No erythema. No pallor.   Psychiatric: She has a normal mood and affect.               Assessment/Plan:     1. Nonintractable headache, unspecified chronicity pattern, unspecified headache type  - ketorolac (TORADOL) injection 60 mg; 2 mL by Intramuscular route Once.  - REFERRAL TO ENT    2. Acute otitis media, unspecified otitis media type  - ciprofloxacin/dexamethasone (CIPRODEX) 0.3-0.1 % Suspension; Place 4 Drops in left ear 2 times a day.  Dispense: 1 Bottle; Refill: 0  - amoxicillin-clavulanate (AUGMENTIN) 875-125 MG Tab; Take 1 Tab by mouth 2 times a day for 10 days.  Dispense: 20 Tab; Refill: 0  - REFERRAL TO ENT    3. Hearing loss of left ear, unspecified hearing loss type  - REFERRAL TO ENT    4. Ringing in ear, left  - REFERRAL TO ENT    5. Balance problem  - REFERRAL TO ENT      Placed on physical findings she has still otitis media and some swelling and erythema of the left ear canal consistent with otitis externa.  Advised to stop clindamycin and Medrol Dosepak  She is to start Augmentin and Ciprodex.  We discussed using probiotic to decrease the chance of antibiotic associated diarrhea which is a possibility given oral antibiotic she has had in the past few weeks.  Stat referral to ENT  Advised her to contact or  preferably directly go to ENT with a referral I gave her so they can see her within the next couple days.  If she has any worsening or new symptoms, she should go to the nearest emergency department  She was given a Toradol IM after which her headache significantly improved.  Plan per orders and instructions  Warning signs reviewed  All her paperwork and forms for FMLA filled out.  At this point in time she cannot even drive to work until she is evaluated by ENT and significantly better.  It is unclear as to how long this is going to last but I I am guessing and hopefully sooner improvement until the end of this month,

## 2019-04-12 NOTE — PATIENT INSTRUCTIONS
Stop clindamycin and all the other medication  Start topical antibiotic drops  Start oral Augmentin twice daily for 10 days  Start taking some probiotic to decrease the chance of developing antibiotic associated  Call ENT, preferably go ASAP to the clinic so they can see you within the next couple of days  [facility] have [facility]

## 2019-05-17 ENCOUNTER — OFFICE VISIT (OUTPATIENT)
Dept: URGENT CARE | Facility: CLINIC | Age: 39
End: 2019-05-17
Payer: COMMERCIAL

## 2019-05-17 VITALS
SYSTOLIC BLOOD PRESSURE: 122 MMHG | DIASTOLIC BLOOD PRESSURE: 78 MMHG | HEIGHT: 62 IN | BODY MASS INDEX: 34.82 KG/M2 | HEART RATE: 84 BPM | OXYGEN SATURATION: 98 % | TEMPERATURE: 98.5 F | RESPIRATION RATE: 20 BRPM | WEIGHT: 189.2 LBS

## 2019-05-17 DIAGNOSIS — Z87.898 HISTORY OF BALANCE DISORDER: ICD-10-CM

## 2019-05-17 DIAGNOSIS — Z86.69 HISTORY OF ACUTE OTITIS MEDIA: ICD-10-CM

## 2019-05-17 PROCEDURE — 99214 OFFICE O/P EST MOD 30 MIN: CPT | Performed by: FAMILY MEDICINE

## 2019-05-18 NOTE — PROGRESS NOTES
"Subjective:      Mary Gold is a 38 y.o. female who presents with Otalgia (left ear injury back on 04/11/2019)            This is a follow-up.  Melody is here for follow-up on last episode where I saw her on April 11.  At that time she had continue otitis media with ringing in the ear, and balance issues.  She was referred to ENT stat and just saw ENT couple of days ago.  She is feeling almost back to normal.  She denies any trouble with balance or ringing.  She has still some residual dullness in hearing in the left ear which she was told is the best she would be at the present time.  She denies any paresthesias, change in vision, trouble walking, balance or ringing sensation.  She gave the FMLA form to the ENT physician (Dr. Iglesias) who declined to fill the forearm and advised her to come to the same doctor she saw a month ago which is me.  She would like to go back to work        Review of Systems   All other systems reviewed and are negative.         Objective:     /78 (BP Location: Left arm, Patient Position: Sitting, BP Cuff Size: Adult)   Pulse 84   Temp 36.9 °C (98.5 °F) (Temporal)   Resp 20   Ht 1.575 m (5' 2\")   Wt 85.8 kg (189 lb 3.2 oz)   SpO2 98%   BMI 34.61 kg/m²      Physical Exam   Constitutional: She is oriented to person, place, and time. She appears well-developed and well-nourished.  Non-toxic appearance. No distress.   HENT:   Head: Normocephalic and atraumatic.   Right Ear: Tympanic membrane, external ear and ear canal normal.   Left Ear: External ear and ear canal normal. No lacerations. No drainage, swelling or tenderness. No foreign bodies. No mastoid tenderness. Tympanic membrane is scarred (Slight scarring noted). Tympanic membrane is not injected, not perforated, not erythematous, not retracted and not bulging.  No middle ear effusion. No hemotympanum.   Nose: Nose normal.   Mouth/Throat: Oropharynx is clear and moist. No oropharyngeal exudate.   Eyes: Pupils are equal, " round, and reactive to light. Conjunctivae and EOM are normal.   Cardiovascular: Normal rate and regular rhythm.  Exam reveals no gallop and no friction rub.    No murmur heard.  Pulmonary/Chest: Effort normal. No stridor. No respiratory distress. She has no wheezes. She has no rales.   Neurological: She is alert and oriented to person, place, and time. She has normal strength. She displays normal reflexes. No cranial nerve deficit or sensory deficit. She exhibits normal muscle tone. She displays a negative Romberg sign. Coordination and gait normal.   Skin: Skin is warm. No rash noted. She is not diaphoretic. No erythema. No pallor.   Psychiatric: She has a normal mood and affect.             Assessment/Plan:     1. History of acute otitis media    2. History of balance disorder      Otitis media and resulting balance problem she had last month has completely resolved  Form filled and patient released to full duty with no restrictions.  Follow-up as needed

## 2019-06-15 ENCOUNTER — OFFICE VISIT (OUTPATIENT)
Dept: URGENT CARE | Facility: MEDICAL CENTER | Age: 39
End: 2019-06-15
Payer: COMMERCIAL

## 2019-06-15 VITALS
RESPIRATION RATE: 16 BRPM | DIASTOLIC BLOOD PRESSURE: 82 MMHG | HEART RATE: 87 BPM | OXYGEN SATURATION: 98 % | TEMPERATURE: 97.6 F | HEIGHT: 62 IN | BODY MASS INDEX: 33.31 KG/M2 | SYSTOLIC BLOOD PRESSURE: 126 MMHG | WEIGHT: 181 LBS

## 2019-06-15 DIAGNOSIS — J02.9 SORE THROAT: ICD-10-CM

## 2019-06-15 DIAGNOSIS — R05.9 COUGH: ICD-10-CM

## 2019-06-15 DIAGNOSIS — L29.9 ITCHY SKIN: ICD-10-CM

## 2019-06-15 PROCEDURE — 99214 OFFICE O/P EST MOD 30 MIN: CPT | Performed by: NURSE PRACTITIONER

## 2019-06-15 NOTE — LETTER
Hannah 15, 2019         Patient: Mary Gold   YOB: 1980   Date of Visit: 6/15/2019           To Whom it May Concern:    Mary Gold was seen in my clinic on 6/15/2019. Due to the nature of her symptoms, please allow patient to not work around adhesive chemicals.    If you have any questions or concerns, please don't hesitate to call.        Sincerely,           RUDI Coronel.  Electronically Signed

## 2019-06-16 ASSESSMENT — ENCOUNTER SYMPTOMS
SORE THROAT: 1
SPUTUM PRODUCTION: 0
NAUSEA: 0
STRIDOR: 0
PALPITATIONS: 0
CHILLS: 0
SHORTNESS OF BREATH: 0
COUGH: 1
VOMITING: 0
BLURRED VISION: 0
DIARRHEA: 0
HEADACHES: 0
CONSTIPATION: 0
FEVER: 0
DIZZINESS: 0
DOUBLE VISION: 0
WHEEZING: 0
ABDOMINAL PAIN: 0
MUSCULOSKELETAL NEGATIVE: 1

## 2019-06-16 NOTE — PROGRESS NOTES
"Subjective:   Mary Gold is a 38 y.o. female who presents for Pharyngitis        HPI   Patient with recurrent cough and sore throat has been intermittent over the past month. States whenever she is around adhesive at work, she develops a sore throat, mild cough, and itchy skin. States she feels like she is allergic to the adhesive. Has been taking OTC allergy medications with mild relief. She is worried that she will develop something \"wrong with her lungs.\" Denies fever or chills. Denies alleviating factors.  She is requesting work note so she cannot work around the adhesive. States she does wear a respirator at work.    Review of Systems   Constitutional: Negative for chills and fever.   HENT: Positive for congestion and sore throat. Negative for ear discharge and ear pain.    Eyes: Negative for blurred vision and double vision.   Respiratory: Positive for cough. Negative for sputum production, shortness of breath, wheezing and stridor.    Cardiovascular: Negative for chest pain and palpitations.   Gastrointestinal: Negative for abdominal pain, constipation, diarrhea, nausea and vomiting.   Musculoskeletal: Negative.    Skin: Positive for itching. Negative for rash.   Neurological: Negative for dizziness and headaches.   All other systems reviewed and are negative.    PMH:  has a past medical history of Chiari malformation; Dizziness; Fatigue; Heart burn; Indigestion; Numbness and tingling in hands; Other acute pain; and Unspecified hemorrhagic conditions. She also has no past medical history of COPD or Psychiatric disorder.  MEDS: No current outpatient prescriptions on file.  ALLERGIES: No Known Allergies  SURGHX:   Past Surgical History:   Procedure Laterality Date   • CRANIECTOMY  8/17/2015    Procedure: SUBOCCIPITAL CRANIECTOMY ;  Surgeon: Tenzin Curtis M.D.;  Location: SURGERY [facility];  Service:    • CERVICAL LAMINECTOMY POSTERIOR  8/17/2015    Procedure: CERVICAL LAMINECTOMY POSTERIOR C1, " "DUROPLASTY;  Surgeon: Tenzin Curtis M.D.;  Location: SURGERY [facility];  Service:    • GYN SURGERY     • OTHER      eye     SOCHX:  reports that she has never smoked. She has never used smokeless tobacco. She reports that she does not drink alcohol or use drugs.  FH: Family history was reviewed, no pertinent findings to report     Objective:   /82 (BP Location: Right arm, Patient Position: Sitting, BP Cuff Size: Adult)   Pulse 87   Temp 36.4 °C (97.6 °F) (Temporal)   Resp 16   Ht 1.575 m (5' 2.01\")   Wt 82.1 kg (181 lb)   SpO2 98%   BMI 33.10 kg/m²   Physical Exam   Constitutional: She is oriented to person, place, and time. She appears well-developed and well-nourished. No distress.   HENT:   Head: Normocephalic.   Right Ear: Hearing, tympanic membrane and ear canal normal. Tympanic membrane is not erythematous. No middle ear effusion.   Left Ear: Hearing, tympanic membrane and ear canal normal. Tympanic membrane is not erythematous.  No middle ear effusion.   Nose: No rhinorrhea. Right sinus exhibits no maxillary sinus tenderness and no frontal sinus tenderness. Left sinus exhibits no maxillary sinus tenderness and no frontal sinus tenderness.   Mouth/Throat: Uvula is midline and mucous membranes are normal. Posterior oropharyngeal erythema present. Tonsils are 0 on the right. Tonsils are 0 on the left. No tonsillar exudate.   Mild erythema, post nasal drip   Eyes: Pupils are equal, round, and reactive to light. Conjunctivae, EOM and lids are normal.   Neck: Normal range of motion. No thyromegaly present.   Cardiovascular: Normal rate, regular rhythm and normal heart sounds.    Pulmonary/Chest: Effort normal and breath sounds normal. No respiratory distress. She has no wheezes.   Lymphadenopathy:        Head (right side): No submandibular and no tonsillar adenopathy present.        Head (left side): No submandibular and no tonsillar adenopathy present.   Neurological: She is alert and oriented " to person, place, and time.   Skin: Skin is warm and dry. No abrasion, no bruising, no purpura and no rash noted. Rash is not nodular, not pustular and not urticarial. She is not diaphoretic.   Psychiatric: She has a normal mood and affect. Her speech is normal and behavior is normal. Judgment and thought content normal.   Vitals reviewed.        Assessment/Plan:   Assessment    1. Sore throat    2. Itchy skin    3. Cough    We discussed during this visit that I believe she needs to open a worker's comp visit since she states she does not want to be around the adhesive at work. Patient states that she would just like a note stating that she should not be around the adhesives at work. I advised her that typically we consider this possibly Worker's Comp and she states she does not want to open a case at this time.  Continue with OTC allergy medications as prior.    Differential diagnosis, natural history, supportive care, and indications for immediate follow-up discussed.

## 2019-09-17 ENCOUNTER — OFFICE VISIT (OUTPATIENT)
Dept: URGENT CARE | Facility: MEDICAL CENTER | Age: 39
End: 2019-09-17
Payer: COMMERCIAL

## 2019-09-17 VITALS
OXYGEN SATURATION: 97 % | HEIGHT: 62 IN | TEMPERATURE: 97.1 F | DIASTOLIC BLOOD PRESSURE: 64 MMHG | BODY MASS INDEX: 32.2 KG/M2 | SYSTOLIC BLOOD PRESSURE: 106 MMHG | HEART RATE: 64 BPM | WEIGHT: 175 LBS

## 2019-09-17 DIAGNOSIS — H10.022 PINK EYE DISEASE OF LEFT EYE: ICD-10-CM

## 2019-09-17 PROCEDURE — 99213 OFFICE O/P EST LOW 20 MIN: CPT | Performed by: FAMILY MEDICINE

## 2019-09-17 RX ORDER — NEOMYCIN POLYMYXIN B SULFATES AND DEXAMETHASONE 3.5; 10000; 1 MG/ML; [USP'U]/ML; MG/ML
2 SUSPENSION/ DROPS OPHTHALMIC 3 TIMES DAILY
Qty: 1 BOTTLE | Refills: 0 | Status: SHIPPED | OUTPATIENT
Start: 2019-09-17 | End: 2019-09-22

## 2019-09-17 ASSESSMENT — ENCOUNTER SYMPTOMS: EYE REDNESS: 1

## 2019-09-18 NOTE — PROGRESS NOTES
"Subjective:      Mary Gold is a 38 y.o. female who presents with Conjunctivitis (L eye, started last week but now its getting itchy, makes vision blurry sometimes)      - This is a pleasant and non toxic appearing 38 y.o. female with c/o Lt eye red x 4-5 days, irritated and a little crusty at times. No trauma, vision change or contact lens use.             ALLERGIES:  Patient has no known allergies.     PMH:  Past Medical History:   Diagnosis Date   • Chiari malformation    • Dizziness    • Fatigue    • Heart burn    • Indigestion    • Numbness and tingling in hands    • Other acute pain     headaches   • Unspecified hemorrhagic conditions         PSH:  Past Surgical History:   Procedure Laterality Date   • CRANIECTOMY  8/17/2015    Procedure: SUBOCCIPITAL CRANIECTOMY ;  Surgeon: Tenzin Curtis M.D.;  Location: SURGERY [facility];  Service:    • CERVICAL LAMINECTOMY POSTERIOR  8/17/2015    Procedure: CERVICAL LAMINECTOMY POSTERIOR C1, DUROPLASTY;  Surgeon: Tenzin Crutis M.D.;  Location: SURGERY [facility];  Service:    • GYN SURGERY     • OTHER      eye       MEDS:    Current Outpatient Medications:   •  neomycin-polymyxin-dexamethasone (MAXITROL) 0.1 % ophthalmic suspension, Place 2 Drops in left eye 3 times a day for 5 days., Disp: 1 Bottle, Rfl: 0    ** I have documented what I find to be significant in regards to past medical, social, family and surgical history  in my HPI or under PMH/PSH/FH review section, otherwise it is contributory **           HPI    Review of Systems   Eyes: Positive for redness.   All other systems reviewed and are negative.         Objective:     /64   Pulse 64   Temp 36.2 °C (97.1 °F) (Temporal)   Ht 1.575 m (5' 2\")   Wt 79.4 kg (175 lb)   SpO2 97%   BMI 32.01 kg/m²      Physical Exam   Constitutional: She appears well-developed and well-nourished. No distress.   HENT:   Head: Normocephalic and atraumatic.   Cardiovascular: Normal heart sounds.   No murmur " heard.  Pulmonary/Chest: Effort normal. No respiratory distress.   Neurological: She is alert. She exhibits normal muscle tone.   Skin: Skin is warm and dry. She is not diaphoretic.   Psychiatric: She has a normal mood and affect. Judgment normal.   Nursing note and vitals reviewed.    Lt eye: medial bulbar conjunctiva injected, + clear DC. No gross fb/abrasions           Assessment/Plan:         1. Pink eye disease of left eye  neomycin-polymyxin-dexamethasone (MAXITROL) 0.1 % ophthalmic suspension       - rest/hydrate       Dx & d/c instructions discussed w/ patient and/or family members.     Follow up with PCP (or here if PCP unavailable) in 2-3 days if symptoms not improving, ER if feeling/getting worse.    Any realistic and/or common medication side effects that may have been given today(i.e. Rash, GI upset/constipation, sedation, elevation of BP or blood sugars) reviewed.     Patient left in stable condition

## 2019-10-21 ENCOUNTER — OFFICE VISIT (OUTPATIENT)
Dept: URGENT CARE | Facility: MEDICAL CENTER | Age: 39
End: 2019-10-21
Payer: COMMERCIAL

## 2019-10-21 VITALS
HEIGHT: 62 IN | OXYGEN SATURATION: 95 % | SYSTOLIC BLOOD PRESSURE: 118 MMHG | HEART RATE: 88 BPM | DIASTOLIC BLOOD PRESSURE: 70 MMHG | WEIGHT: 175 LBS | TEMPERATURE: 97.5 F | BODY MASS INDEX: 32.2 KG/M2 | RESPIRATION RATE: 13 BRPM

## 2019-10-21 DIAGNOSIS — R05.9 COUGH: ICD-10-CM

## 2019-10-21 DIAGNOSIS — J22 ACUTE RESPIRATORY INFECTION: ICD-10-CM

## 2019-10-21 PROCEDURE — 99214 OFFICE O/P EST MOD 30 MIN: CPT | Performed by: NURSE PRACTITIONER

## 2019-10-21 RX ORDER — AZITHROMYCIN 250 MG/1
TABLET, FILM COATED ORAL
Qty: 6 TAB | Refills: 0 | Status: SHIPPED | OUTPATIENT
Start: 2019-10-21 | End: 2020-07-14

## 2019-10-21 RX ORDER — BENZONATATE 200 MG/1
200 CAPSULE ORAL 3 TIMES DAILY PRN
Qty: 40 CAP | Refills: 0 | Status: SHIPPED | OUTPATIENT
Start: 2019-10-21 | End: 2020-07-14

## 2019-10-21 RX ORDER — CODEINE PHOSPHATE AND GUAIFENESIN 10; 100 MG/5ML; MG/5ML
5 SOLUTION ORAL
Qty: 120 ML | Refills: 0 | Status: SHIPPED | OUTPATIENT
Start: 2019-10-21 | End: 2019-10-28

## 2019-10-21 ASSESSMENT — ENCOUNTER SYMPTOMS
HEADACHES: 1
CHILLS: 1
MYALGIAS: 0
HEARTBURN: 0
FEVER: 0
WHEEZING: 0
RHINORRHEA: 0
COUGH: 1
SWEATS: 0
WEIGHT LOSS: 0
SORE THROAT: 1
SHORTNESS OF BREATH: 0

## 2019-10-21 NOTE — LETTER
October 21, 2019       Patient: Mary Gold   YOB: 1980   Date of Visit: 10/21/2019         To Whom It May Concern:    It is my medical opinion that Mary Gold return to full duty, no restrictions on 10/22/19 due to a medical illness.            Sincerely,          PARVIN Larios  Electronically Signed

## 2020-01-14 ENCOUNTER — HOSPITAL ENCOUNTER (EMERGENCY)
Facility: MEDICAL CENTER | Age: 40
End: 2020-01-14
Attending: EMERGENCY MEDICINE
Payer: COMMERCIAL

## 2020-01-14 VITALS
SYSTOLIC BLOOD PRESSURE: 105 MMHG | BODY MASS INDEX: 33.23 KG/M2 | RESPIRATION RATE: 16 BRPM | HEIGHT: 62 IN | HEART RATE: 74 BPM | DIASTOLIC BLOOD PRESSURE: 66 MMHG | OXYGEN SATURATION: 96 % | TEMPERATURE: 97.7 F | WEIGHT: 180.56 LBS

## 2020-01-14 DIAGNOSIS — R42 VERTIGO: ICD-10-CM

## 2020-01-14 PROCEDURE — A9270 NON-COVERED ITEM OR SERVICE: HCPCS | Performed by: EMERGENCY MEDICINE

## 2020-01-14 PROCEDURE — 99284 EMERGENCY DEPT VISIT MOD MDM: CPT

## 2020-01-14 PROCEDURE — 700102 HCHG RX REV CODE 250 W/ 637 OVERRIDE(OP): Performed by: EMERGENCY MEDICINE

## 2020-01-14 RX ORDER — PROMETHAZINE HYDROCHLORIDE 25 MG/1
25 TABLET ORAL EVERY 6 HOURS PRN
Qty: 10 TAB | Refills: 0 | Status: SHIPPED | OUTPATIENT
Start: 2020-01-14 | End: 2020-07-14

## 2020-01-14 RX ORDER — MECLIZINE HYDROCHLORIDE 25 MG/1
25 TABLET ORAL 3 TIMES DAILY PRN
Qty: 30 TAB | Refills: 0 | Status: SHIPPED | OUTPATIENT
Start: 2020-01-14 | End: 2020-07-14

## 2020-01-14 RX ORDER — MECLIZINE HYDROCHLORIDE 25 MG/1
25 TABLET ORAL ONCE
Status: COMPLETED | OUTPATIENT
Start: 2020-01-14 | End: 2020-01-14

## 2020-01-14 RX ADMIN — MECLIZINE HYDROCHLORIDE 25 MG: 25 TABLET ORAL at 18:15

## 2020-01-15 NOTE — ED PROVIDER NOTES
"ED Provider Note    CHIEF COMPLAINT  Chief Complaint   Patient presents with   • Dizziness     \"room spinning\", better when she lies down and closes eyes.  Causes nausea.  Pt suspects may be from the glues she is exposed at work.     • Chills     x 5 days       HPI  Mary Gold is a 39 y.o. female who presents to the ED secondary to vertigo type symptoms.  The patient states that over the last 5 days she has had 3 episodes to where the patient has vertigo when she turns her head.  She states it lasted between 3 and 8 hours.  She does have a history of vertigo she also has a history of a Chiari malformation.  Review of medical records show the patient was seen and evaluated multiple times the beginning of last year for both ear ache and vertigo type symptoms.  However the patient states that she was not having vertigo at this point in time.  Patient has not tried any medicines.  She states that typically at night.  The patient denies any numbness, tingling, weakness, URI type symptoms, tinnitus.    REVIEW OF SYSTEMS  See HPI for further details. All other systems are negative.     PAST MEDICAL HISTORY  Past Medical History:   Diagnosis Date   • Chiari malformation    • Dizziness    • Fatigue    • Heart burn    • Indigestion    • Numbness and tingling in hands    • Other acute pain     headaches   • Unspecified hemorrhagic conditions        FAMILY HISTORY  No family history on file.    SOCIAL HISTORY  Social History     Socioeconomic History   • Marital status:      Spouse name: Not on file   • Number of children: Not on file   • Years of education: Not on file   • Highest education level: Not on file   Occupational History   • Not on file   Social Needs   • Financial resource strain: Not on file   • Food insecurity:     Worry: Not on file     Inability: Not on file   • Transportation needs:     Medical: Not on file     Non-medical: Not on file   Tobacco Use   • Smoking status: Never Smoker   • Smokeless " "tobacco: Never Used   Substance and Sexual Activity   • Alcohol use: No     Comment: occ   • Drug use: No   • Sexual activity: Not on file   Lifestyle   • Physical activity:     Days per week: Not on file     Minutes per session: Not on file   • Stress: Not on file   Relationships   • Social connections:     Talks on phone: Not on file     Gets together: Not on file     Attends Jainism service: Not on file     Active member of club or organization: Not on file     Attends meetings of clubs or organizations: Not on file     Relationship status: Not on file   • Intimate partner violence:     Fear of current or ex partner: Not on file     Emotionally abused: Not on file     Physically abused: Not on file     Forced sexual activity: Not on file   Other Topics Concern   • Not on file   Social History Narrative    ** Merged History Encounter **            SURGICAL HISTORY  Past Surgical History:   Procedure Laterality Date   • CRANIECTOMY  8/17/2015    Procedure: SUBOCCIPITAL CRANIECTOMY ;  Surgeon: Tenzin Curtis M.D.;  Location: SURGERY [facility];  Service:    • CERVICAL LAMINECTOMY POSTERIOR  8/17/2015    Procedure: CERVICAL LAMINECTOMY POSTERIOR C1, DUROPLASTY;  Surgeon: Tenzin Curtis M.D.;  Location: SURGERY [facility];  Service:    • GYN SURGERY     • OTHER      eye       CURRENT MEDICATIONS  Home Medications    **Home medications have not yet been reviewed for this encounter**         ALLERGIES  No Known Allergies    PHYSICAL EXAM  VITAL SIGNS: /66   Pulse 74   Temp 36.5 °C (97.7 °F) (Temporal)   Resp 16   Ht 1.575 m (5' 2\")   Wt 81.9 kg (180 lb 8.9 oz)   LMP 12/23/2019 (Exact Date)   SpO2 96%   BMI 33.02 kg/m²   Constitutional: Well developed, Well nourished, mild distress, Non-toxic appearance.   HENT: Atraumatic, normocephalic, oropharynx is clear  Eyes: EOMI, PERRLA, the patient has bilateral lateral nystagmus, right greater than left, symptoms are worse with looking to the " right.  Neck: Normal range of motion, No tenderness, Supple, No stridor.   Cardiovascular: Normal heart rate, Normal rhythm.   Thorax & Lungs: Normal breath sounds, No respiratory distress, No wheezing, No chest tenderness.   Abdomen: Bowel sounds normal, Soft, No tenderness, No masses, No pulsatile masses.   Skin: Warm, Dry, No erythema, No rash.   Back: No tenderness, No CVA tenderness.   Extremities: Intact distal pulses, No edema, No tenderness.   Neurologic: Wake alert speech is clear cranial nerves II through XII grossly intact, muscle strength 5/5 throughout, the patient's gait is normal, heel-to-toe is normal  Psychiatric: Affect normal, Judgment normal, Mood normal.     COURSE & MEDICAL DECISION MAKING  Pertinent Labs & Imaging studies reviewed. (See chart for details)  Patient with peripheral vertigo, will put the patient on meclizine, Phenergan, the patient will follow up with Dr. Rice as an outpatient for her vertigo.  Will return with worsening symptoms.        FINAL IMPRESSION  1. Vertigo        Patient referred to primary care provider for blood pressure management    This dictation was created using voice recognition software. The accuracy of the dictation is limited to the abilities of the software. I expect there may be some errors of grammar and possibly content. The nursing notes were reviewed and certain aspects of this information were incorporated into this note.    Electronically signed by: Franki Fairbanks M.D., 1/14/2020 6:15 PM

## 2020-01-15 NOTE — ED NOTES
Assumed care of pt for d/c. To f/u with ent,  prescriptions at [facility], return for worsening s/s

## 2020-06-18 ENCOUNTER — HOSPITAL ENCOUNTER (EMERGENCY)
Facility: MEDICAL CENTER | Age: 40
End: 2020-06-18
Attending: EMERGENCY MEDICINE
Payer: MEDICAID

## 2020-06-18 VITALS
DIASTOLIC BLOOD PRESSURE: 63 MMHG | TEMPERATURE: 97.8 F | OXYGEN SATURATION: 97 % | SYSTOLIC BLOOD PRESSURE: 111 MMHG | WEIGHT: 184.75 LBS | RESPIRATION RATE: 19 BRPM | HEART RATE: 77 BPM | HEIGHT: 62 IN | BODY MASS INDEX: 34 KG/M2

## 2020-06-18 DIAGNOSIS — R73.9 ELEVATED BLOOD SUGAR: ICD-10-CM

## 2020-06-18 DIAGNOSIS — B02.9 HERPES ZOSTER WITHOUT COMPLICATION: ICD-10-CM

## 2020-06-18 LAB
APPEARANCE UR: CLEAR
BACTERIA #/AREA URNS HPF: ABNORMAL /HPF
BILIRUB UR QL STRIP.AUTO: NEGATIVE
COLOR UR: YELLOW
EPI CELLS #/AREA URNS HPF: ABNORMAL /HPF
GLUCOSE BLD-MCNC: 157 MG/DL (ref 65–99)
GLUCOSE UR STRIP.AUTO-MCNC: NEGATIVE MG/DL
HCG UR QL: NEGATIVE
KETONES UR STRIP.AUTO-MCNC: NEGATIVE MG/DL
LEUKOCYTE ESTERASE UR QL STRIP.AUTO: ABNORMAL
MICRO URNS: ABNORMAL
MUCOUS THREADS #/AREA URNS HPF: ABNORMAL /HPF
NITRITE UR QL STRIP.AUTO: NEGATIVE
PH UR STRIP.AUTO: 7 [PH] (ref 5–8)
PROT UR QL STRIP: NEGATIVE MG/DL
RBC # URNS HPF: ABNORMAL /HPF
RBC UR QL AUTO: NEGATIVE
SP GR UR STRIP.AUTO: 1.01
WBC #/AREA URNS HPF: ABNORMAL /HPF

## 2020-06-18 PROCEDURE — 99284 EMERGENCY DEPT VISIT MOD MDM: CPT

## 2020-06-18 PROCEDURE — 82962 GLUCOSE BLOOD TEST: CPT

## 2020-06-18 PROCEDURE — 81025 URINE PREGNANCY TEST: CPT

## 2020-06-18 PROCEDURE — 81001 URINALYSIS AUTO W/SCOPE: CPT

## 2020-06-18 PROCEDURE — 700102 HCHG RX REV CODE 250 W/ 637 OVERRIDE(OP): Performed by: EMERGENCY MEDICINE

## 2020-06-18 PROCEDURE — A9270 NON-COVERED ITEM OR SERVICE: HCPCS | Performed by: EMERGENCY MEDICINE

## 2020-06-18 RX ORDER — ACETAMINOPHEN 500 MG
1000 TABLET ORAL ONCE
Status: COMPLETED | OUTPATIENT
Start: 2020-06-18 | End: 2020-06-18

## 2020-06-18 RX ORDER — GABAPENTIN 100 MG/1
100 CAPSULE ORAL
Qty: 14 CAP | Refills: 0 | Status: SHIPPED | OUTPATIENT
Start: 2020-06-18 | End: 2020-07-02

## 2020-06-18 RX ORDER — VALACYCLOVIR HYDROCHLORIDE 500 MG/1
1000 TABLET, FILM COATED ORAL ONCE
Status: COMPLETED | OUTPATIENT
Start: 2020-06-18 | End: 2020-06-18

## 2020-06-18 RX ORDER — VALACYCLOVIR HYDROCHLORIDE 1 G/1
1000 TABLET, FILM COATED ORAL 2 TIMES DAILY
Qty: 20 TAB | Refills: 0 | Status: SHIPPED | OUTPATIENT
Start: 2020-06-18 | End: 2020-06-28

## 2020-06-18 RX ORDER — ACETAMINOPHEN 500 MG
1000 TABLET ORAL EVERY 8 HOURS PRN
Qty: 20 TAB | Refills: 0 | Status: SHIPPED | OUTPATIENT
Start: 2020-06-18 | End: 2020-07-14

## 2020-06-18 RX ADMIN — VALACYCLOVIR HYDROCHLORIDE 1000 MG: 500 TABLET, FILM COATED ORAL at 03:13

## 2020-06-18 RX ADMIN — ACETAMINOPHEN 1000 MG: 500 TABLET, FILM COATED ORAL at 03:12

## 2020-06-18 NOTE — Clinical Note
Mary Gold was seen and treated in our emergency department on 6/18/2020.  She may return to work on 06/20/2020.       If you have any questions or concerns, please don't hesitate to call.      Adin Winter M.D.

## 2020-06-18 NOTE — ED PROVIDER NOTES
ED Provider Note    CHIEF COMPLAINT  Chief Complaint   Patient presents with   • Rash     x 4 days, lower back and groin       HPI    Primary care provider: None  Means of arrival: POV/Walk-in  History obtained from: Patient  History limited by: [place]    Mary Gold is a 39 y.o. female who presents with a painful rash for the last 4 days.  Isolated to her lower back and left anterior groin.  Described as vesicular/blisterlike with redness.  No prior episodes.  She did have chickenpox when she was a child.  No alleviating measures attempted.  Aggravated by palpation and contact.  Pain worsened overnight described as a burning pain.  Was not able to sleep so came in for emergent evaluation.  No sick contacts.  No fevers or cough or dyspnea or abdominal pain or vomiting or known coronavirus contact.  She is not around anyone that is immunosuppressed.  LMP 1 month ago.  She is more stressed than usual recently due to the pandemic but has no thoughts of self-harm.    REVIEW OF SYSTEMS  Constitutional: Negative for fever or chills.   HENT: Negative for rhinorrhea or sore throat.    Eyes: Negative for vision changes or redness or eye pain.   Respiratory: Negative for cough or shortness of breath.    Cardiovascular: Negative for chest pain or palpitations.   Gastrointestinal: Negative for nausea, vomiting, or abdominal pain.   Genitourinary: Negative for dysuria or flank pain.  Positive for occasional polyuria.  Musculoskeletal: Negative for back pain or joint pain.   Skin: Positive for painful itchy rash on her low back that wraps around her pelvis.  Neurological: Negative for sensory or motor changes.   Psychiatric/Behavioral: Negative for depression or suicidal ideas but positive for increased generalized stress due to the current pandemic.    PAST MEDICAL HISTORY   has a past medical history of Chiari malformation, Dizziness, Fatigue, Heart burn, Indigestion, Numbness and tingling in hands, Other acute pain, and  "Unspecified hemorrhagic conditions.    PAST FAMILY HISTORY  History reviewed. No pertinent family history.    SOCIAL HISTORY  Social History     Tobacco Use   • Smoking status: Never Smoker   • Smokeless tobacco: Never Used   Substance and Sexual Activity   • Alcohol use: Yes   • Drug use: No   • Sexual activity: Not on file       SURGICAL HISTORY   has a past surgical history that includes other; craniectomy (8/17/2015); cervical laminectomy posterior (8/17/2015); and gyn surgery.    CURRENT MEDICATIONS  No daily medications.    ALLERGIES  No Known Allergies    PHYSICAL EXAM  VITAL SIGNS: /63   Pulse 77   Temp 36.6 °C (97.8 °F) (Temporal)   Resp 19   Ht 1.575 m (5' 2\")   Wt 83.8 kg (184 lb 11.9 oz)   LMP 05/19/2020   SpO2 97%   BMI 33.79 kg/m²    Pulse ox interpretation: On room air, I interpret this pulse ox as normal.  Constitutional: Well-developed, well-nourished. Sitting up.   HEENT: Normocephalic, atraumatic. Posterior pharynx clear, mucous membranes moist.  Eyes:  EOMI. Normal sclerae.  Neck: Supple, nontender.  Chest/Pulmonary: Clear to ausculation bilaterally, no wheezes or rhonchi.  Cardiovascular: Regular rate and rhythm, no murmur.   Abdomen: Soft, nontender; no rebound, guarding, or masses.  Back: No CVA or midline tenderness.   Musculoskeletal: No deformity or edema.  Neuro: Clear speech, normal coordination, cranial nerves II-XII grossly intact, no focal asymmetry or sensory deficits.   Psych: Normal mood and affect.  Skin: There is some raised vesicular rash on her left lower back and also to her left anterior suprapubic area that appears to be along a similar dermatome.  No open wounds or surrounding cellulitis.  Skin is otherwise warm and dry.      DIAGNOSTIC STUDIES / PROCEDURES    LABS & EKG  Results for orders placed or performed during the hospital encounter of 06/18/20   URINALYSIS,CULTURE IF INDICATED    Specimen: Urine   Result Value Ref Range    Color Yellow     Character " Clear     Specific Gravity 1.015 <1.035    Ph 7.0 5.0 - 8.0    Glucose Negative Negative mg/dL    Ketones Negative Negative mg/dL    Protein Negative Negative mg/dL    Bilirubin Negative Negative    Nitrite Negative Negative    Leukocyte Esterase Trace (A) Negative    Occult Blood Negative Negative    Micro Urine Req Microscopic    BETA-HCG QUALITATIVE URINE   Result Value Ref Range    Beta-Hcg Urine Negative Negative   URINE MICROSCOPIC (W/UA)   Result Value Ref Range    WBC 5-10 (A) /hpf    RBC 0-2 /hpf    Bacteria Few (A) None /hpf    Epithelial Cells Moderate (A) Few /hpf    Mucous Threads Few /hpf   ACCU-CHEK GLUCOSE   Result Value Ref Range    Glucose - Accu-Ck 157 (H) 65 - 99 mg/dL         COURSE & MEDICAL DECISION MAKING    This is a 39 y.o. female who presents with painful itchy rash, vesicular appearing.  Increase stress lately.    Differential Diagnosis includes but is not limited to:  Shingles, cellulitis, arthropod bite, allergic reaction, urticaria, diabetes, pregnancy    ED Course:  This is a pleasant 39-year-old female presenting with what appears to be shingles, she does report some increased stress lately, and occasionally has polyuria so I will screen her for diabetes with an Accu-Chek and check a urinalysis and a pregnancy test.    Patient is not pregnant, mildly hyperglycemic to 157.  No ketones or glucose in the urine.  Doubt DKA, possibly prediabetic.    On recheck patient is resting comfortably updated on lab results.  She will follow-up with her primary care provider for recheck of her sugars soon as possible, I personally contacted schedulers to try to get her in with a new primary care provider soon as possible of 1 of our outpatient clinics.  Valacyclovir prescribed, acetaminophen for pain, gabapentin to take before bed for pain as well.  Return for any new or worsening pain or fevers any other new or worse symptoms.    Medications   valACYclovir (VALTREX) caplet 1,000 mg (1,000 mg Oral  Given 6/18/20 0313)   acetaminophen (TYLENOL) tablet 1,000 mg (1,000 mg Oral Given 6/18/20 0312)       FINAL IMPRESSION  1. Herpes zoster without complication    2. Elevated blood sugar        PRESCRIPTIONS  Discharge Medication List as of 6/18/2020  3:24 AM      START taking these medications    Details   valacyclovir (VALTREX) 1 GM Tab Take 1 Tab by mouth 2 times a day for 10 days., Disp-20 Tab,R-0, Print Rx Paper      gabapentin (NEURONTIN) 100 MG Cap Take 1 Cap by mouth every bedtime for 14 days., Disp-14 Cap,R-0, Print Rx Paper      acetaminophen (TYLENOL) 500 MG Tab Take 2 Tabs by mouth every 8 hours as needed for Moderate Pain., Disp-20 Tab,R-0, Print Rx Paper             FOLLOW UP  [facility], Emergency Dept  [address]  [place] [id number] [phone]  Today  If you have ANY new or worse symptoms!    Our primary care clinics  Schedulers should contact you in the next few days to arrange a new primary care provider for recheck and routine health care.  Schedule an appointment as soon as possible for a visit in 2 days      -DISCHARGE-       Results, exam findings, clinical impression, presumed diagnosis, treatment options, and strict return precautions were discussed with the patient, and they verbalized understanding, agreed with, and appreciated the plan of care.    Pertinent Lab studies reviewed and verified by myself, as well as nursing notes and the patient's past medical, family, and social histories (See chart for details).    The patient is referred to a primary physician for blood pressure management, diabetic screening, and for all other preventative health concerns.     Portions of this record were made with voice recognition software.  Despite my review, spelling/grammar/context errors may still remain.  Interpretation of this chart should be taken in this context.    Electronically signed by Adin Winter M.D. on 6/18/2020 at 5:22 AM.

## 2020-06-18 NOTE — DISCHARGE INSTRUCTIONS
You were seen and evaluated in the Emergency Department at [facility] for:     Painful rash, this looks like shingles!    You had the following tests and studies:    Your blood sugar is slightly elevated.     You received the following medications:    Valacyclovir, acetaminophen    You received the following prescriptions:    Valacyclovir, acetaminophen, gabapentin; take as prescribed  ----------------------------    Please make sure to follow up with:    We will contact her schedulers to arrange for you new primary care provider for recheck and routine health care, your blood sugar was slightly elevated today this should be rechecked within the next month.  You could have prediabetes.    Hopefully her rash and improved with the medications prescribed, if he gets any worse or if you have any new or worse symptoms especially fevers or eye pain or any other concerns return to the ER immediately.    Good luck, we hope you get better soon!  ----------------------------    We always encourage patients to return IMMEDIATELY if they have:  Increased or changing pain, passing out, fevers over 100.4 (taken in your mouth or rectally) for more than 2 days, redness or swelling of skin or tissues, feeling like your heart is beating fast, chest pain that is new or worsening, trouble breathing, feeling like your throat is closing up and can not breath, inability to walk, weakness of any part of your body, new dizziness, severe bleeding that won't stop from any part of your body, if you can't eat or drink, or if you have any other concerns.   If you feel worse, please know that you can always return with any questions, concerns, worse symptoms, or you are feeling unsafe. We certainly cannot say for sure that we have ruled out every illness or dangerous disease, but we feel that at this specific time, your exam, tests, and vital signs like heart rate and blood pressure are safe for discharge.

## 2020-06-18 NOTE — ED NOTES
Reviewed discharge instructions and printed prescriptions x 3 w/ pt, verbalized understanding to information provided including follow up care, return precautions and Shingles care, addressed multiple questions r/t medications and wound care, denied further questions/concerns.  Pt provided w/ printed work release note by ERP.  Pt ambulated from ED.

## 2020-06-18 NOTE — ED TRIAGE NOTES
"Chief Complaint   Patient presents with   • Rash     x 4 days, lower back and groin     /69   Pulse 80   Temp 36.6 °C (97.8 °F) (Temporal)   Resp 19   Ht 1.575 m (5' 2\")   Wt 83.8 kg (184 lb 11.9 oz)   LMP 05/19/2020   SpO2 97%   BMI 33.79 kg/m²     Pt with noted rash to lower back and groin started about four days ago, c/o pain and burning.    Covid Screen Negative.    "

## 2020-07-14 ENCOUNTER — APPOINTMENT (OUTPATIENT)
Dept: RADIOLOGY | Facility: MEDICAL CENTER | Age: 40
End: 2020-07-14
Attending: EMERGENCY MEDICINE
Payer: MEDICAID

## 2020-07-14 ENCOUNTER — HOSPITAL ENCOUNTER (EMERGENCY)
Facility: MEDICAL CENTER | Age: 40
End: 2020-07-14
Attending: EMERGENCY MEDICINE
Payer: MEDICAID

## 2020-07-14 VITALS
WEIGHT: 180.12 LBS | HEIGHT: 62 IN | BODY MASS INDEX: 33.15 KG/M2 | HEART RATE: 74 BPM | SYSTOLIC BLOOD PRESSURE: 93 MMHG | DIASTOLIC BLOOD PRESSURE: 57 MMHG | TEMPERATURE: 98.2 F | RESPIRATION RATE: 16 BRPM | OXYGEN SATURATION: 96 %

## 2020-07-14 DIAGNOSIS — O26.891 ABDOMINAL PAIN DURING PREGNANCY IN FIRST TRIMESTER: ICD-10-CM

## 2020-07-14 DIAGNOSIS — R10.13 EPIGASTRIC PAIN: ICD-10-CM

## 2020-07-14 DIAGNOSIS — R10.9 ABDOMINAL PAIN DURING PREGNANCY IN FIRST TRIMESTER: ICD-10-CM

## 2020-07-14 DIAGNOSIS — R21 RASH: ICD-10-CM

## 2020-07-14 LAB
ALBUMIN SERPL BCP-MCNC: 4.2 G/DL (ref 3.2–4.9)
ALBUMIN/GLOB SERPL: 1.4 G/DL
ALP SERPL-CCNC: 73 U/L (ref 30–99)
ALT SERPL-CCNC: 15 U/L (ref 2–50)
ANION GAP SERPL CALC-SCNC: 14 MMOL/L (ref 7–16)
APPEARANCE UR: CLEAR
AST SERPL-CCNC: 18 U/L (ref 12–45)
B-HCG SERPL-ACNC: 9603 MIU/ML (ref 0–10)
BACTERIA #/AREA URNS HPF: ABNORMAL /HPF
BASOPHILS # BLD AUTO: 0.6 % (ref 0–1.8)
BASOPHILS # BLD: 0.04 K/UL (ref 0–0.12)
BILIRUB SERPL-MCNC: 0.5 MG/DL (ref 0.1–1.5)
BILIRUB UR QL STRIP.AUTO: NEGATIVE
BUN SERPL-MCNC: 14 MG/DL (ref 8–22)
CALCIUM SERPL-MCNC: 9 MG/DL (ref 8.4–10.2)
CHLORIDE SERPL-SCNC: 103 MMOL/L (ref 96–112)
CO2 SERPL-SCNC: 18 MMOL/L (ref 20–33)
COLOR UR: YELLOW
CREAT SERPL-MCNC: 0.57 MG/DL (ref 0.5–1.4)
EOSINOPHIL # BLD AUTO: 0.07 K/UL (ref 0–0.51)
EOSINOPHIL NFR BLD: 1 % (ref 0–6.9)
EPI CELLS #/AREA URNS HPF: ABNORMAL /HPF
ERYTHROCYTE [DISTWIDTH] IN BLOOD BY AUTOMATED COUNT: 46.3 FL (ref 35.9–50)
GLOBULIN SER CALC-MCNC: 3.1 G/DL (ref 1.9–3.5)
GLUCOSE SERPL-MCNC: 122 MG/DL (ref 65–99)
GLUCOSE UR STRIP.AUTO-MCNC: NEGATIVE MG/DL
HCT VFR BLD AUTO: 42.7 % (ref 37–47)
HGB BLD-MCNC: 14.1 G/DL (ref 12–16)
IMM GRANULOCYTES # BLD AUTO: 0.03 K/UL (ref 0–0.11)
IMM GRANULOCYTES NFR BLD AUTO: 0.4 % (ref 0–0.9)
KETONES UR STRIP.AUTO-MCNC: ABNORMAL MG/DL
LEUKOCYTE ESTERASE UR QL STRIP.AUTO: ABNORMAL
LIPASE SERPL-CCNC: 28 U/L (ref 7–58)
LYMPHOCYTES # BLD AUTO: 1.37 K/UL (ref 1–4.8)
LYMPHOCYTES NFR BLD: 20.2 % (ref 22–41)
MCH RBC QN AUTO: 28 PG (ref 27–33)
MCHC RBC AUTO-ENTMCNC: 33 G/DL (ref 33.6–35)
MCV RBC AUTO: 84.7 FL (ref 81.4–97.8)
MICRO URNS: ABNORMAL
MONOCYTES # BLD AUTO: 0.5 K/UL (ref 0–0.85)
MONOCYTES NFR BLD AUTO: 7.4 % (ref 0–13.4)
MUCOUS THREADS #/AREA URNS HPF: ABNORMAL /HPF
NEUTROPHILS # BLD AUTO: 4.76 K/UL (ref 2–7.15)
NEUTROPHILS NFR BLD: 70.4 % (ref 44–72)
NITRITE UR QL STRIP.AUTO: NEGATIVE
NRBC # BLD AUTO: 0 K/UL
NRBC BLD-RTO: 0 /100 WBC
PH UR STRIP.AUTO: 6.5 [PH] (ref 5–8)
PLATELET # BLD AUTO: 180 K/UL (ref 164–446)
PMV BLD AUTO: 12.6 FL (ref 9–12.9)
POTASSIUM SERPL-SCNC: 4 MMOL/L (ref 3.6–5.5)
PROT SERPL-MCNC: 7.3 G/DL (ref 6–8.2)
PROT UR QL STRIP: NEGATIVE MG/DL
RBC # BLD AUTO: 5.04 M/UL (ref 4.2–5.4)
RBC # URNS HPF: ABNORMAL /HPF
RBC UR QL AUTO: NEGATIVE
SODIUM SERPL-SCNC: 135 MMOL/L (ref 135–145)
SP GR UR STRIP.AUTO: 1.02
WBC # BLD AUTO: 6.8 K/UL (ref 4.8–10.8)
WBC #/AREA URNS HPF: ABNORMAL /HPF

## 2020-07-14 PROCEDURE — 87086 URINE CULTURE/COLONY COUNT: CPT

## 2020-07-14 PROCEDURE — 81001 URINALYSIS AUTO W/SCOPE: CPT

## 2020-07-14 PROCEDURE — 85025 COMPLETE CBC W/AUTO DIFF WBC: CPT

## 2020-07-14 PROCEDURE — 76801 OB US < 14 WKS SINGLE FETUS: CPT

## 2020-07-14 PROCEDURE — 80053 COMPREHEN METABOLIC PANEL: CPT

## 2020-07-14 PROCEDURE — 700102 HCHG RX REV CODE 250 W/ 637 OVERRIDE(OP): Performed by: EMERGENCY MEDICINE

## 2020-07-14 PROCEDURE — 99284 EMERGENCY DEPT VISIT MOD MDM: CPT

## 2020-07-14 PROCEDURE — A9270 NON-COVERED ITEM OR SERVICE: HCPCS | Performed by: EMERGENCY MEDICINE

## 2020-07-14 PROCEDURE — 84702 CHORIONIC GONADOTROPIN TEST: CPT

## 2020-07-14 PROCEDURE — 36415 COLL VENOUS BLD VENIPUNCTURE: CPT

## 2020-07-14 PROCEDURE — 83690 ASSAY OF LIPASE: CPT

## 2020-07-14 RX ORDER — ACETAMINOPHEN 325 MG/1
650 TABLET ORAL ONCE
Status: COMPLETED | OUTPATIENT
Start: 2020-07-14 | End: 2020-07-14

## 2020-07-14 RX ORDER — SULFAMETHOXAZOLE AND TRIMETHOPRIM 800; 160 MG/1; MG/1
1 TABLET ORAL 2 TIMES DAILY
Qty: 6 TAB | Refills: 0 | Status: SHIPPED | OUTPATIENT
Start: 2020-07-14 | End: 2020-07-17

## 2020-07-14 RX ORDER — CETIRIZINE HYDROCHLORIDE 10 MG/1
10 TABLET ORAL DAILY
Qty: 10 TAB | Refills: 0 | Status: SHIPPED | OUTPATIENT
Start: 2020-07-14 | End: 2020-07-24

## 2020-07-14 RX ADMIN — ACETAMINOPHEN 650 MG: 325 TABLET, FILM COATED ORAL at 17:49

## 2020-07-14 NOTE — ED PROVIDER NOTES
ED Provider Note    ED Provider Note    Primary care provider: Pcp Pt States None  Means of arrival: Walk-in  History obtained from: Patient    CHIEF COMPLAINT  Chief Complaint   Patient presents with   • Abdominal Pain   • Pregnancy     Seen at 4:37 PM.   HPI  Mary Gold is a 39 y.o. female -0-0-2 who presents to the Emergency Department with stabbing midline epigastric pain radiating towards the pubic region beginning yesterday, constant, no significant modifying factors.  The patient states the pain is mild to moderate.  She had a home pregnancy test that was +3 days ago and is here to get checked out for this.  Last pregnancies were over 10 years ago.  She denies any chronic medical conditions.  She is not on any medications.  She denies any nausea, vomiting, dysuria.  She also feels that there is a shingles rash on her bilateral lower legs.  She had a similar rash on her left flank a few months ago that was treated with medications and resolved.  She states the rash developed around 1 2 weeks ago and it is not painful but intensely pruritic.  REVIEW OF SYSTEMS  See HPI,   Remainder of ROS negative.     PAST MEDICAL HISTORY   has a past medical history of Chiari malformation, Dizziness, Fatigue, Heart burn, Indigestion, Numbness and tingling in hands, Other acute pain, and Unspecified hemorrhagic conditions.    SURGICAL HISTORY   has a past surgical history that includes other; craniectomy (2015); cervical laminectomy posterior (2015); and gyn surgery.    SOCIAL HISTORY  Social History     Tobacco Use   • Smoking status: Never Smoker   • Smokeless tobacco: Never Used   Substance Use Topics   • Alcohol use: Yes   • Drug use: No      Social History     Substance and Sexual Activity   Drug Use No       FAMILY HISTORY  No family history on file.    CURRENT MEDICATIONS  Reviewed.  See Encounter Summary.     ALLERGIES  No Known Allergies    PHYSICAL EXAM  VITAL SIGNS: BP (!) 93/57   Pulse 74    "Temp 36.8 °C (98.2 °F) (Temporal)   Resp 16   Ht 1.575 m (5' 2\")   Wt 81.7 kg (180 lb 1.9 oz)   LMP 05/18/2020   SpO2 96%   BMI 32.94 kg/m²   Constitutional: Awake, alert in no apparent distress.  HENT: Normocephalic, Bilateral external ears normal. Nose normal.   Eyes: Conjunctiva normal, non-icteric, EOMI.    Thorax & Lungs: Easy unlabored respirations, Clear to ascultation bilaterally.  Cardiovascular: Regular rate, Regular rhythm, No murmurs, rubs or gallops.  Abdomen:  Soft, diffuse upper abdominal tenderness with predominance to the midline epigastric region.  No rebound, no guarding, negative Mathew's.  Bedside ultrasound: Gallbladder wall 0.35 cm.  No gallstones, no pericholecystic fluid, negative sonographic Mathew's.  :    Skin: Few raised erythematous papules on the bilateral popliteal fossa, most consistent with bug bites.  Musculoskeletal:   No cyanosis, clubbing or edema.  Neurologic: Alert, Grossly non-focal.   Psychiatric: Normal affect, Normal mood  Lymphatic:  No cervical LAD        RADIOLOGY  US-OB 1ST TRIMESTER WITH TRANSVAGINAL (COMBO)   Final Result      1.  Single living intrauterine gestation of an estimated menstrual age 6 weeks 1 day. Ultrasound [facility] 3/8/2021. Clinical [facility] 2/22/2021.      2.  Normal appearance of each ovary.      3.  No adnexal mass or free fluid.      4.  No perigestational hemorrhage identified..            COURSE & MEDICAL DECISION MAKING  Pertinent Labs & Imaging studies reviewed. (See chart for details)    Differential diagnoses include but are not limited to: Abdominal pain in pregnancy, ectopic pregnancy, gastritis, pancreatitis, cholecystitis    4:37 PM - Medical record reviewed, patient seen and examined at bedside.    Decision Making:  This is a 39 y.o. year old female who presents with epigastric pain and pregnancy.  The patient states the pain radiated from her epigastrium down to her pelvic region.  For this reason ultrasound was performed that reveals a " viable IUP, no concerns for ectopic pregnancy.  She does have some bacteria in the urine which is not overwhelming in terms of infectious but because she is pregnant we will treat her for asymptomatic bacteriuria.  She is Rh+ as seen from prior lab draws, no indication to repeat this and no indication for RhoGam today.  I did a bedside ultrasound on the gallbladder, as the patient does not have any gallstones, no sonographic evidence of cholecystitis, LFTs are normal.  Lipase is normal as well.    There is not appear to be an acute surgical process at play today, this is most likely some gastritis.  I will treat the patient with Tylenol.  She also feels that she has shingles on her legs, these lesions are more consistent with a bug bite, it is bilateral, they are not vesicular and she describes itching, not burning.  Cetirizine should be adequate to treat this.    Discharge Medications:  New Prescriptions    CETIRIZINE (ZYRTEC) 10 MG TAB    Take 1 Tab by mouth every day for 10 days.    SULFAMETHOXAZOLE-TRIMETHOPRIM (BACTRIM DS) 800-160 MG TABLET    Take 1 Tab by mouth 2 times a day for 3 days.       The patient was discharged home (see d/c instructions) was told to return immediately for any signs or symptoms listed, or any worsening at all.  The patient verbally agreed to the discharge precautions and follow-up plan which is documented in EPIC.        FINAL IMPRESSION  1. Epigastric pain    2. Abdominal pain during pregnancy in first trimester    3. Rash

## 2020-07-14 NOTE — ED TRIAGE NOTES
Pt ambulates to triage  Chief Complaint   Patient presents with   • Abdominal Pain   • Pregnancy   pt reports + home pregnancy test Sunday, LMP May 18  Diffuse middle abd pain started yesterday    Pt A & 0 x 4, speech clear, ambulates well  COVID-19 screening criteria completed, pt denies high risk travel and denies contact with COVID-19 positive pt    Pt updated on triage process and asked to inform RN of any changes while waiting in lobby.

## 2020-07-15 NOTE — ED NOTES
Discharge instructions provided.  Pt verbalized the understanding of discharge instructions to follow up with PCP and to return to ER if condition worsens.  Pt ambulated out of ER without difficulty.  RX 1

## 2020-07-16 LAB
BACTERIA UR CULT: NORMAL
SIGNIFICANT IND 70042: NORMAL
SITE SITE: NORMAL
SOURCE SOURCE: NORMAL

## 2020-09-23 ENCOUNTER — APPOINTMENT (OUTPATIENT)
Dept: OBGYN | Facility: CLINIC | Age: 40
End: 2020-09-23

## 2020-10-08 ENCOUNTER — HOSPITAL ENCOUNTER (EMERGENCY)
Facility: MEDICAL CENTER | Age: 40
End: 2020-10-08
Attending: EMERGENCY MEDICINE
Payer: MEDICAID

## 2020-10-08 ENCOUNTER — APPOINTMENT (OUTPATIENT)
Dept: RADIOLOGY | Facility: MEDICAL CENTER | Age: 40
End: 2020-10-08
Attending: EMERGENCY MEDICINE
Payer: MEDICAID

## 2020-10-08 VITALS
RESPIRATION RATE: 17 BRPM | OXYGEN SATURATION: 96 % | HEART RATE: 78 BPM | TEMPERATURE: 97.8 F | DIASTOLIC BLOOD PRESSURE: 78 MMHG | SYSTOLIC BLOOD PRESSURE: 105 MMHG | HEIGHT: 62 IN | BODY MASS INDEX: 34.85 KG/M2 | WEIGHT: 189.38 LBS

## 2020-10-08 DIAGNOSIS — Z34.92 SECOND TRIMESTER PREGNANCY: ICD-10-CM

## 2020-10-08 DIAGNOSIS — R10.9 ABDOMINAL PAIN, UNSPECIFIED ABDOMINAL LOCATION: ICD-10-CM

## 2020-10-08 DIAGNOSIS — S39.012A BACK STRAIN, INITIAL ENCOUNTER: ICD-10-CM

## 2020-10-08 LAB
APPEARANCE UR: ABNORMAL
BACTERIA #/AREA URNS HPF: ABNORMAL /HPF
BILIRUB UR QL STRIP.AUTO: NEGATIVE
COLOR UR: YELLOW
EPI CELLS #/AREA URNS HPF: ABNORMAL /HPF
GLUCOSE UR STRIP.AUTO-MCNC: NEGATIVE MG/DL
HCG UR QL: POSITIVE
KETONES UR STRIP.AUTO-MCNC: ABNORMAL MG/DL
LEUKOCYTE ESTERASE UR QL STRIP.AUTO: ABNORMAL
MICRO URNS: ABNORMAL
MUCOUS THREADS #/AREA URNS HPF: ABNORMAL /HPF
NITRITE UR QL STRIP.AUTO: NEGATIVE
PH UR STRIP.AUTO: 6 [PH] (ref 5–8)
PROT UR QL STRIP: NEGATIVE MG/DL
RBC # URNS HPF: ABNORMAL /HPF
RBC UR QL AUTO: NEGATIVE
SP GR UR REFRACTOMETRY: 1.02
WBC #/AREA URNS HPF: ABNORMAL /HPF

## 2020-10-08 PROCEDURE — 76815 OB US LIMITED FETUS(S): CPT

## 2020-10-08 PROCEDURE — 81001 URINALYSIS AUTO W/SCOPE: CPT

## 2020-10-08 PROCEDURE — 99284 EMERGENCY DEPT VISIT MOD MDM: CPT

## 2020-10-08 PROCEDURE — 81025 URINE PREGNANCY TEST: CPT

## 2020-10-08 RX ORDER — ACETAMINOPHEN 325 MG/1
325 TABLET ORAL EVERY 6 HOURS PRN
Status: SHIPPED | COMMUNITY
End: 2021-09-20

## 2020-10-08 ASSESSMENT — FIBROSIS 4 INDEX: FIB4 SCORE: .981155781039212278

## 2020-10-08 NOTE — Clinical Note
Mary Gold was seen and treated in our emergency department on 10/8/2020.  She may return to work on 10/09/2020.  Limit heavy lifting to < 10lbs until cleared by your doctor.     If you have any questions or concerns, please don't hesitate to call.      Mateo Car M.D.

## 2020-10-08 NOTE — ED NOTES
Pt given d/c paperwork and work note w/ restrictions, pt verbalized understanding all information given. Pt ambulated out of the ER w/o difficulty w/ friend.

## 2020-10-08 NOTE — ED PROVIDER NOTES
ED Provider Note    CHIEF COMPLAINT  Chief Complaint   Patient presents with   • Low Back Pain     x 24 hrs   • Abdominal Pain     lower abd   • Pregnancy     x 5 months       HPI  Mary Gold is a 39 y.o. approximately 20 weeks pregnant, G3, P2, presenting with lower back pain and diffuse lower abdominal pain for the past day.  She states that it started after lifting heavy objects while at work.  No vaginal bleeding.  No bloody stool or black stool.  No dysuria or hematuria.  No vomiting.  She states that she was lifting heavy boxes that were approximately 30 pounds each while at work the other day prior to onset of pain symptoms.  No numbness or weakness in her lower extremities.  No prior history of back injury or chronic back pain.  She states that despite being approximately 20 weeks pregnant she has not yet followed up with OB/GYN.  Fortunately she does have an appointment scheduled though the soonest she can get was in the next few weeks.  No prior abdominal surgeries except for .    REVIEW OF SYSTEMS  See HPI for further details. All other systems are negative.     PAST MEDICAL HISTORY   has a past medical history of Chiari malformation, Dizziness, Fatigue, Heart burn, Indigestion, Numbness and tingling in hands, Other acute pain, and Unspecified hemorrhagic conditions.    SOCIAL HISTORY  Social History     Tobacco Use   • Smoking status: Never Smoker   • Smokeless tobacco: Never Used   Substance and Sexual Activity   • Alcohol use: Yes   • Drug use: No   • Sexual activity: Not on file       SURGICAL HISTORY   has a past surgical history that includes other; craniectomy (2015); cervical laminectomy posterior (2015); and gyn surgery.    CURRENT MEDICATIONS  Home Medications     Reviewed by Daron De Guzman (Pharmacy Tech) on 10/08/20 at 1420  Med List Status: Complete   Medication Last Dose Status   acetaminophen (TYLENOL) 325 MG Tab 10/7/2020 Active   Prenatal Vit-Fe Fumarate-FA  "(PRENATAL PO) 10/7/2020 Active                ALLERGIES  No Known Allergies    PHYSICAL EXAM  VITAL SIGNS: /64   Pulse 87   Temp 36.6 °C (97.8 °F) (Temporal)   Resp 16   Ht 1.575 m (5' 2\")   Wt 85.9 kg (189 lb 6 oz)   LMP 05/19/2020   SpO2 96%   Breastfeeding No   BMI 34.64 kg/m²   Pulse ox interpretation: I interpret this pulse ox as normal.  Constitutional: Alert in no apparent distress.  HENT: No signs of trauma, Bilateral external ears normal, Nose normal.   Eyes: Pupils are equal and reactive, Conjunctiva normal, Non-icteric.   Neck: Normal range of motion, No tenderness, Supple, No stridor.   Cardiovascular: Regular rate and rhythm.   Thorax & Lungs: Normal breath sounds, No respiratory distress, No wheezing, No chest tenderness.   Abdomen: Bowel sounds normal, Soft, mild diffuse lower abdominal tenderness, No masses, No pulsatile masses. No peritoneal signs.  Skin: Warm, Dry, No erythema, No rash.   Back: No bony tenderness, No CVA tenderness.   Extremities: Intact distal pulses, No edema, No tenderness, No cyanosis  Neurologic: Alert, No focal deficits noted.       DIAGNOSTIC STUDIES / PROCEDURES      LABS  Labs Reviewed   URINALYSIS - Abnormal; Notable for the following components:       Result Value    Character Turbid (*)     Ketones Trace (*)     Leukocyte Esterase Small (*)     All other components within normal limits   HCG QUALITATIVE UR - Abnormal; Notable for the following components:    Beta-Hcg Urine Positive (*)     All other components within normal limits   URINE MICROSCOPIC (W/UA) - Abnormal; Notable for the following components:    WBC 10-20 (*)     Bacteria Many (*)     Epithelial Cells Many (*)     All other components within normal limits   REFRACTOMETER SG         RADIOLOGY  US-OB LIMITED TRANSABDOMINAL   Final Result      Single intrauterine pregnancy of an estimated gestational age of 18w 4d with an estimated date of delivery of 3/7/2021.      No perigestational " "hemorrhage      INTERPRETING LOCATION: [address], [place], [id number]            COURSE & MEDICAL DECISION MAKING    Medications - No data to display    Pertinent Labs & Imaging studies reviewed. (See chart for details)  39 y.o. female presenting with lower abdominal pain and lower back pain after a day of lifting heavy boxes at work.  No dysuria or hematuria.  Pain she states is right in the lower back where the spine meets the pelvis.  Denies flank pain.  No prior history of back injury.  No vomiting.  Has very mild abdominal discomfort on palpation.  No GI symptoms such as vomiting or diarrhea.    Urinalysis was performed that was equivocal.  There is significant epithelial cells and I believe it is more likely consistent with a contaminated urinary sample rather than a true urinary tract infection.    Pelvic ultrasound was performed showing single IUP approximately 18 weeks and 4 days of gestation.  No evidence of tima-gestational hemorrhage or other abnormality.    I do suspect that the patient's lower back pain is musculoskeletal in nature.  No obvious signs of sepsis.  No tachycardia or fever.  No distress.  No vomiting.  Recommending acetaminophen for pain management and to limit any heavy lifting while at work to less than 10 pounds at a time.    The patient was instructed to follow-up with primary care physician for further management.  To return immediately for any worsening symptoms or development of any other concerning signs or symptoms. The patient verbalizes understanding in their own words.    /78   Pulse 78   Temp 36.6 °C (97.8 °F) (Temporal)   Resp 17   Ht 1.575 m (5' 2\")   Wt 85.9 kg (189 lb 6 oz)   LMP 05/19/2020   SpO2 96%   Breastfeeding No   BMI 34.64 kg/m²     The patient was referred to primary care where they will receive further BP management.      [facility], Emergency Dept  [address]  [place] [id number] [phone]    As " needed, If symptoms worsen    Primary care doctor    Schedule an appointment as soon as possible for a visit   And your OB      FINAL IMPRESSION  1. Abdominal pain, unspecified abdominal location    2. Back strain, initial encounter    3. Second trimester pregnancy            Electronically signed by: Mateo Car M.D., 10/8/2020 12:53 PM

## 2020-10-08 NOTE — ED TRIAGE NOTES
Pt to er with c/o low back pan and lower abd pain x 24 hrs, denies vag d/c, is G=3, P=2 , 5 months along in this preg with no prenatal care as if yet . Denies recent travel or known covid exposure

## 2020-12-26 ENCOUNTER — HOSPITAL ENCOUNTER (EMERGENCY)
Facility: MEDICAL CENTER | Age: 40
End: 2020-12-26
Attending: EMERGENCY MEDICINE
Payer: MEDICAID

## 2020-12-26 VITALS
HEART RATE: 87 BPM | TEMPERATURE: 96.2 F | SYSTOLIC BLOOD PRESSURE: 114 MMHG | DIASTOLIC BLOOD PRESSURE: 72 MMHG | OXYGEN SATURATION: 98 % | RESPIRATION RATE: 16 BRPM

## 2020-12-26 DIAGNOSIS — R21 RASH: ICD-10-CM

## 2020-12-26 PROCEDURE — 99281 EMR DPT VST MAYX REQ PHY/QHP: CPT

## 2020-12-26 RX ORDER — CLOTRIMAZOLE 1 %
CREAM (GRAM) TOPICAL
Qty: 1 EACH | Refills: 0 | Status: SHIPPED | OUTPATIENT
Start: 2020-12-26 | End: 2020-12-26

## 2020-12-26 RX ORDER — NYSTATIN 100000 U/G
1 CREAM TOPICAL 2 TIMES DAILY
Qty: 28 APPLICATION | Refills: 0 | Status: SHIPPED | OUTPATIENT
Start: 2020-12-26 | End: 2020-12-26 | Stop reason: SDUPTHER

## 2020-12-26 RX ORDER — NYSTATIN 100000 U/G
1 CREAM TOPICAL 2 TIMES DAILY
Qty: 28 APPLICATION | Refills: 0 | Status: SHIPPED | OUTPATIENT
Start: 2020-12-26 | End: 2021-01-09

## 2020-12-27 NOTE — ED PROVIDER NOTES
ED Provider Note    CHIEF COMPLAINT  Chief Complaint   Patient presents with   • Rash       HPI  Mary Gold is a 39 y.o. female who presents complaining of a rash.  She is 30 weeks pregnant.  This is not her first pregnancy.  She just noticed this today.  She is not sure how long its been there.  She states he has an odor.  She cannot characterize the odor.  It is uncomfortable but does not hurts per se.  It is underneath her abdomen on the left.  No rashes elsewhere.  She never had this before.  No one else has it.  No new exposures.  Everything is going well with the baby, she follows with Dr. Yusuf.  There is no other complaint.    PAST MEDICAL HISTORY  Past Medical History:   Diagnosis Date   • Chiari malformation    • Dizziness    • Fatigue    • Heart burn    • Indigestion    • Numbness and tingling in hands    • Other acute pain     headaches   • Unspecified hemorrhagic conditions        FAMILY HISTORY  History reviewed. No pertinent family history.    SOCIAL HISTORY  Social History     Tobacco Use   • Smoking status: Never Smoker   • Smokeless tobacco: Never Used   Substance Use Topics   • Alcohol use: Not Currently   • Drug use: No         SURGICAL HISTORY  Past Surgical History:   Procedure Laterality Date   • CRANIECTOMY  8/17/2015    Procedure: SUBOCCIPITAL CRANIECTOMY ;  Surgeon: Tenzin Curtis M.D.;  Location: SURGERY [facility];  Service:    • CERVICAL LAMINECTOMY POSTERIOR  8/17/2015    Procedure: CERVICAL LAMINECTOMY POSTERIOR C1, DUROPLASTY;  Surgeon: Tenzin Curtis M.D.;  Location: SURGERY [facility];  Service:    • GYN SURGERY     • OTHER      eye       CURRENT MEDICATIONS    I have reviewed the nurses notes and/or the list brought with the patient.    ALLERGIES  No Known Allergies    REVIEW OF SYSTEMS  See HPI for further details. Review of systems as above, otherwise all other systems are negative.     PHYSICAL EXAM  VITAL SIGNS: /72   Pulse 87   Temp (!) 35.7 °C (96.2  °F) (Temporal)   Resp 16   LMP 05/19/2020   SpO2 98%     Constitutional: Well appearing patient in no acute distress.  Not toxic, nor ill in appearance.  Eyes: Pupils equally round.  No scleral icterus.   Neck: Full nontender range of motion.  Cardiovascular: Regular heart rate and rhythm.  No murmurs, rubs, nor gallop appreciated.   Thorax & Lungs: Chest is nontender.  Lungs are clear to auscultation with good air movement bilaterally.  No wheeze, rhonchi, nor rales.   Abdomen: Gravid.  Nontender.  Underneath the pannus on the left there is a roughly silver dollar sized area of moist erythema.  Arguably worse over the striae.  I see no lesions elsewhere.  Skin: No purpura nor petechia noted.  Extremities/Musculoskeletal: No sign of trauma.    Neurologic: Alert & oriented.  Strength and sensation is intact all around.  Gait is normal.  Psychiatric: Normal affect appropriate for the clinical situation.  MEDICAL RECORD  I have reviewed patient's medical record and pertinent results are listed above.    COURSE & MEDICAL DECISION MAKING  I have reviewed any medical record information, laboratory studies and radiographic results as noted above.  Patient presents with an area of rash and discomfort underneath her pannus.  This has the appearance of a yeast intertrigo.  We will go ahead and treat her with topical nystatin for a 2-week course.  Consideration for PUPPP, quite frankly that looks less likely, at least at this point.  I did print out instructions about this however as well, and pointed out that if she is not getting better with the nystatin this could be considered, specifically addition of a steroid.  Of asked her to follow-up with Dr. Yusuf this upcoming week for recheck.  Instructions on both intertrigo as well as PUPPP    FINAL IMPRESSION  1. Rash    2.  Suspect intertrigo due to yeast  3.  Pregnancy       This dictation was created using voice recognition software.    Electronically signed by: Mik  CORY Aquino M.D., 12/26/2020 10:21 PM

## 2020-12-27 NOTE — ED TRIAGE NOTES
Rash to lower abd with malodorous drainage. Noticed it today.  Denies pain, slight itching. Pt is 30 weeks pregnant, no pregnancy related c/o.

## 2020-12-27 NOTE — DISCHARGE INSTRUCTIONS
Your rash looks very suspicious for intertrigo due to yeast.  Go ahead and use this cream which I have prescribed.  Try to keep the area dry otherwise.      If you are not getting better, another rash to consider is PUPPP which would be treated with antihistamines and steroids.  (The steroids will be bad to use if this is a yeast infection which is why I want to try treating that first.)    I recommend following up with Dr. Yusuf later this week for recheck to see your progress.

## 2021-01-05 ENCOUNTER — HOSPITAL ENCOUNTER (OUTPATIENT)
Dept: HOSPITAL 8 - LDOP | Age: 41
Setting detail: OBSERVATION
LOS: 1 days | Discharge: HOME | End: 2021-01-06
Attending: OBSTETRICS & GYNECOLOGY | Admitting: OBSTETRICS & GYNECOLOGY
Payer: MEDICAID

## 2021-01-05 VITALS — DIASTOLIC BLOOD PRESSURE: 53 MMHG | SYSTOLIC BLOOD PRESSURE: 92 MMHG

## 2021-01-05 VITALS — HEIGHT: 62 IN | BODY MASS INDEX: 35.41 KG/M2 | WEIGHT: 192.4 LBS

## 2021-01-05 DIAGNOSIS — Z3A.31: ICD-10-CM

## 2021-01-05 DIAGNOSIS — Z79.4: ICD-10-CM

## 2021-01-05 DIAGNOSIS — O09.523: ICD-10-CM

## 2021-01-05 DIAGNOSIS — Z20.822: ICD-10-CM

## 2021-01-05 DIAGNOSIS — O36.8330: Primary | ICD-10-CM

## 2021-01-05 DIAGNOSIS — O24.414: ICD-10-CM

## 2021-01-05 DIAGNOSIS — O99.213: ICD-10-CM

## 2021-01-05 DIAGNOSIS — Z79.899: ICD-10-CM

## 2021-01-05 DIAGNOSIS — E66.9: ICD-10-CM

## 2021-01-05 DIAGNOSIS — O09.33: ICD-10-CM

## 2021-01-05 LAB
BASOPHILS # BLD AUTO: 0 X10^3/UL (ref 0–0.1)
BASOPHILS NFR BLD AUTO: 0 % (ref 0–1)
EOSINOPHIL # BLD AUTO: 0 X10^3/UL (ref 0–0.4)
EOSINOPHIL NFR BLD AUTO: 0 % (ref 1–7)
ERYTHROCYTE [DISTWIDTH] IN BLOOD BY AUTOMATED COUNT: 14.9 % (ref 9.6–15.2)
LYMPHOCYTES # BLD AUTO: 1.2 X10^3/UL (ref 1–3.4)
LYMPHOCYTES NFR BLD AUTO: 18 % (ref 22–44)
MCH RBC QN AUTO: 27.7 PG (ref 27–34.8)
MCHC RBC AUTO-ENTMCNC: 33.3 G/DL (ref 32.4–35.8)
MD: NO
MICROSCOPIC: (no result)
MONOCYTES # BLD AUTO: 0.6 X10^3/UL (ref 0.2–0.8)
MONOCYTES NFR BLD AUTO: 8 % (ref 2–9)
NEUTROPHILS # BLD AUTO: 5.1 X10^3/UL (ref 1.8–6.8)
NEUTROPHILS NFR BLD AUTO: 74 % (ref 42–75)
PLATELET # BLD AUTO: 202 X10^3/UL (ref 130–400)
PMV BLD AUTO: 9.6 FL (ref 7.4–10.4)
RBC # BLD AUTO: 3.98 X10^6/UL (ref 3.82–5.3)

## 2021-01-05 PROCEDURE — G0378 HOSPITAL OBSERVATION PER HR: HCPCS

## 2021-01-05 PROCEDURE — 96372 THER/PROPH/DIAG INJ SC/IM: CPT

## 2021-01-05 PROCEDURE — 81003 URINALYSIS AUTO W/O SCOPE: CPT

## 2021-01-05 PROCEDURE — 87635 SARS-COV-2 COVID-19 AMP PRB: CPT

## 2021-01-05 PROCEDURE — 86850 RBC ANTIBODY SCREEN: CPT

## 2021-01-05 PROCEDURE — 82947 ASSAY GLUCOSE BLOOD QUANT: CPT

## 2021-01-05 PROCEDURE — 86592 SYPHILIS TEST NON-TREP QUAL: CPT

## 2021-01-05 PROCEDURE — 96361 HYDRATE IV INFUSION ADD-ON: CPT

## 2021-01-05 PROCEDURE — 96360 HYDRATION IV INFUSION INIT: CPT

## 2021-01-05 PROCEDURE — 86900 BLOOD TYPING SEROLOGIC ABO: CPT

## 2021-01-05 PROCEDURE — 82962 GLUCOSE BLOOD TEST: CPT

## 2021-01-05 PROCEDURE — 87081 CULTURE SCREEN ONLY: CPT

## 2021-01-05 PROCEDURE — 59025 FETAL NON-STRESS TEST: CPT

## 2021-01-05 PROCEDURE — 36415 COLL VENOUS BLD VENIPUNCTURE: CPT

## 2021-01-05 PROCEDURE — 85025 COMPLETE CBC W/AUTO DIFF WBC: CPT

## 2021-01-05 PROCEDURE — 87086 URINE CULTURE/COLONY COUNT: CPT

## 2021-01-06 VITALS — SYSTOLIC BLOOD PRESSURE: 100 MMHG | DIASTOLIC BLOOD PRESSURE: 57 MMHG

## 2021-01-26 ENCOUNTER — HOSPITAL ENCOUNTER (OUTPATIENT)
Dept: HOSPITAL 8 - LDOP | Age: 41
Discharge: HOME | End: 2021-01-26
Attending: OBSTETRICS & GYNECOLOGY
Payer: MEDICAID

## 2021-01-26 DIAGNOSIS — O36.8130: Primary | ICD-10-CM

## 2021-01-26 DIAGNOSIS — Z3A.34: ICD-10-CM

## 2021-01-26 LAB — MICROSCOPIC: (no result)

## 2021-01-26 PROCEDURE — 76819 FETAL BIOPHYS PROFIL W/O NST: CPT

## 2021-01-26 PROCEDURE — 81001 URINALYSIS AUTO W/SCOPE: CPT

## 2021-01-26 PROCEDURE — 87086 URINE CULTURE/COLONY COUNT: CPT

## 2021-01-26 PROCEDURE — 59025 FETAL NON-STRESS TEST: CPT

## 2021-02-02 ENCOUNTER — HOSPITAL ENCOUNTER (EMERGENCY)
Facility: MEDICAL CENTER | Age: 41
End: 2021-02-02
Attending: OBSTETRICS & GYNECOLOGY | Admitting: OBSTETRICS & GYNECOLOGY
Payer: MEDICAID

## 2021-02-02 VITALS
SYSTOLIC BLOOD PRESSURE: 104 MMHG | HEIGHT: 62 IN | BODY MASS INDEX: 35.51 KG/M2 | TEMPERATURE: 97.4 F | RESPIRATION RATE: 18 BRPM | OXYGEN SATURATION: 96 % | WEIGHT: 193 LBS | HEART RATE: 83 BPM | DIASTOLIC BLOOD PRESSURE: 57 MMHG

## 2021-02-02 LAB
ALBUMIN SERPL BCP-MCNC: 3 G/DL (ref 3.2–4.9)
ALBUMIN/GLOB SERPL: 0.9 G/DL
ALP SERPL-CCNC: 134 U/L (ref 30–99)
ALT SERPL-CCNC: 17 U/L (ref 2–50)
ANION GAP SERPL CALC-SCNC: 10 MMOL/L (ref 7–16)
APPEARANCE UR: CLEAR
AST SERPL-CCNC: 23 U/L (ref 12–45)
BASOPHILS # BLD AUTO: 0.4 % (ref 0–1.8)
BASOPHILS # BLD: 0.03 K/UL (ref 0–0.12)
BILIRUB SERPL-MCNC: 0.2 MG/DL (ref 0.1–1.5)
BUN SERPL-MCNC: 8 MG/DL (ref 8–22)
CALCIUM SERPL-MCNC: 8.3 MG/DL (ref 8.5–10.5)
CHLORIDE SERPL-SCNC: 101 MMOL/L (ref 96–112)
CO2 SERPL-SCNC: 19 MMOL/L (ref 20–33)
COLOR UR AUTO: YELLOW
CREAT SERPL-MCNC: 0.45 MG/DL (ref 0.5–1.4)
EOSINOPHIL # BLD AUTO: 0.08 K/UL (ref 0–0.51)
EOSINOPHIL NFR BLD: 1 % (ref 0–6.9)
ERYTHROCYTE [DISTWIDTH] IN BLOOD BY AUTOMATED COUNT: 44 FL (ref 35.9–50)
GLOBULIN SER CALC-MCNC: 3.3 G/DL (ref 1.9–3.5)
GLUCOSE BLD-MCNC: 128 MG/DL (ref 65–99)
GLUCOSE BLD-MCNC: 129 MG/DL (ref 65–99)
GLUCOSE SERPL-MCNC: 131 MG/DL (ref 65–99)
GLUCOSE UR QL STRIP.AUTO: NEGATIVE MG/DL
HCT VFR BLD AUTO: 32.6 % (ref 37–47)
HGB BLD-MCNC: 10.3 G/DL (ref 12–16)
IMM GRANULOCYTES # BLD AUTO: 0.07 K/UL (ref 0–0.11)
IMM GRANULOCYTES NFR BLD AUTO: 0.9 % (ref 0–0.9)
KETONES UR QL STRIP.AUTO: NEGATIVE MG/DL
LEUKOCYTE ESTERASE UR QL STRIP.AUTO: NEGATIVE
LYMPHOCYTES # BLD AUTO: 1.49 K/UL (ref 1–4.8)
LYMPHOCYTES NFR BLD: 18.6 % (ref 22–41)
MCH RBC QN AUTO: 26.3 PG (ref 27–33)
MCHC RBC AUTO-ENTMCNC: 31.6 G/DL (ref 33.6–35)
MCV RBC AUTO: 83.2 FL (ref 81.4–97.8)
MONOCYTES # BLD AUTO: 0.58 K/UL (ref 0–0.85)
MONOCYTES NFR BLD AUTO: 7.3 % (ref 0–13.4)
NEUTROPHILS # BLD AUTO: 5.74 K/UL (ref 2–7.15)
NEUTROPHILS NFR BLD: 71.8 % (ref 44–72)
NITRITE UR QL STRIP.AUTO: NEGATIVE
NRBC # BLD AUTO: 0 K/UL
NRBC BLD-RTO: 0 /100 WBC
PH UR STRIP.AUTO: 7 [PH] (ref 5–8)
PLATELET # BLD AUTO: 204 K/UL (ref 164–446)
PMV BLD AUTO: 11.7 FL (ref 9–12.9)
POTASSIUM SERPL-SCNC: 3.3 MMOL/L (ref 3.6–5.5)
PROT SERPL-MCNC: 6.3 G/DL (ref 6–8.2)
PROT UR QL STRIP: NEGATIVE MG/DL
RBC # BLD AUTO: 3.92 M/UL (ref 4.2–5.4)
RBC UR QL AUTO: NEGATIVE
SODIUM SERPL-SCNC: 130 MMOL/L (ref 135–145)
SP GR UR STRIP.AUTO: 1.02 (ref 1–1.03)
URATE SERPL-MCNC: 3.2 MG/DL (ref 1.9–8.2)
WBC # BLD AUTO: 8 K/UL (ref 4.8–10.8)

## 2021-02-02 PROCEDURE — 84550 ASSAY OF BLOOD/URIC ACID: CPT

## 2021-02-02 PROCEDURE — A9270 NON-COVERED ITEM OR SERVICE: HCPCS | Performed by: OBSTETRICS & GYNECOLOGY

## 2021-02-02 PROCEDURE — 80053 COMPREHEN METABOLIC PANEL: CPT

## 2021-02-02 PROCEDURE — 36415 COLL VENOUS BLD VENIPUNCTURE: CPT

## 2021-02-02 PROCEDURE — 81002 URINALYSIS NONAUTO W/O SCOPE: CPT

## 2021-02-02 PROCEDURE — 302449 STATCHG TRIAGE ONLY (STATISTIC)

## 2021-02-02 PROCEDURE — 700102 HCHG RX REV CODE 250 W/ 637 OVERRIDE(OP): Performed by: OBSTETRICS & GYNECOLOGY

## 2021-02-02 PROCEDURE — 59025 FETAL NON-STRESS TEST: CPT

## 2021-02-02 PROCEDURE — 82962 GLUCOSE BLOOD TEST: CPT

## 2021-02-02 PROCEDURE — 85025 COMPLETE CBC W/AUTO DIFF WBC: CPT

## 2021-02-02 RX ORDER — ACETAMINOPHEN 500 MG
1000 TABLET ORAL EVERY 6 HOURS PRN
Status: DISCONTINUED | OUTPATIENT
Start: 2021-02-02 | End: 2021-02-02 | Stop reason: HOSPADM

## 2021-02-02 RX ADMIN — ACETAMINOPHEN 1000 MG: 500 TABLET ORAL at 10:54

## 2021-02-02 ASSESSMENT — FIBROSIS 4 INDEX: FIB4 SCORE: 1.01

## 2021-02-02 NOTE — DISCHARGE INSTRUCTIONS
May take Tylenol 1000 mg or Excedrin migraine every 6 hours as needed for H/A  Follow up with Dr. Yusuf Thursday for your previously scheduled appointment.

## 2021-02-02 NOTE — PROGRESS NOTES
40 y.o.  EDC 3/6/21 EGA 35.3 here to LDA2 c/o severe H/a since last night. Pt states she took 500 mg Tylenol last night without relief, and woke up this morning at 0500 rating h/a pain 10/10, now 7/10. Pt denies hx of migraines. Insulin dependent GDM. Took 40 units of insulin last night as prescribed, Fasting FSBS 156 per pt. Also c/o dizziness &  swelling of feet, denies epigastric pain. 2+ DTR's, no clonus. VSS /60 pulse 82 0900- bedside , clean catch urine dip WNL.   Report to Dr. Yusuf. Labs ordered.   1045 labs reviewed with Dr. Yusuf. 1000 mg Tylenol given. Pt reporting h/a decreased to 5/10.   Pt has f/u appointment with Dr. Yusuf on Thursday, discharge order received. Pt educated she can take Tylenol or Excedrin migraine for H/a. Repeat FSBS 129. Discharged to home, ambulatory.

## 2021-02-20 ENCOUNTER — HOSPITAL ENCOUNTER (INPATIENT)
Dept: HOSPITAL 8 - LDIP | Age: 41
LOS: 7 days | Discharge: HOME | End: 2021-02-27
Attending: OBSTETRICS & GYNECOLOGY | Admitting: OBSTETRICS & GYNECOLOGY
Payer: MEDICAID

## 2021-02-20 ENCOUNTER — HOSPITAL ENCOUNTER (OUTPATIENT)
Dept: HOSPITAL 8 - LDOP | Age: 41
Discharge: HOME | End: 2021-02-20
Attending: OBSTETRICS & GYNECOLOGY
Payer: MEDICAID

## 2021-02-20 VITALS — BODY MASS INDEX: 36.88 KG/M2 | WEIGHT: 200.4 LBS | HEIGHT: 62 IN

## 2021-02-20 DIAGNOSIS — O09.93: Primary | ICD-10-CM

## 2021-02-20 DIAGNOSIS — O34.219: ICD-10-CM

## 2021-02-20 DIAGNOSIS — Z3A.38: ICD-10-CM

## 2021-02-20 PROCEDURE — 87635 SARS-COV-2 COVID-19 AMP PRB: CPT

## 2021-02-20 PROCEDURE — 90715 TDAP VACCINE 7 YRS/> IM: CPT

## 2021-02-20 PROCEDURE — 76819 FETAL BIOPHYS PROFIL W/O NST: CPT

## 2021-02-20 PROCEDURE — 76815 OB US LIMITED FETUS(S): CPT

## 2021-02-20 PROCEDURE — 86900 BLOOD TYPING SEROLOGIC ABO: CPT

## 2021-02-20 PROCEDURE — 85025 COMPLETE CBC W/AUTO DIFF WBC: CPT

## 2021-02-20 PROCEDURE — 82962 GLUCOSE BLOOD TEST: CPT

## 2021-02-20 PROCEDURE — 59025 FETAL NON-STRESS TEST: CPT

## 2021-02-20 PROCEDURE — 36415 COLL VENOUS BLD VENIPUNCTURE: CPT

## 2021-02-20 PROCEDURE — 86592 SYPHILIS TEST NON-TREP QUAL: CPT

## 2021-02-20 PROCEDURE — 86850 RBC ANTIBODY SCREEN: CPT

## 2021-02-25 VITALS — SYSTOLIC BLOOD PRESSURE: 124 MMHG | DIASTOLIC BLOOD PRESSURE: 70 MMHG

## 2021-02-25 LAB
BASOPHILS # BLD AUTO: 0 X10^3/UL (ref 0–0.1)
BASOPHILS NFR BLD AUTO: 1 % (ref 0–1)
EOSINOPHIL # BLD AUTO: 0.1 X10^3/UL (ref 0–0.4)
EOSINOPHIL NFR BLD AUTO: 1 % (ref 1–7)
ERYTHROCYTE [DISTWIDTH] IN BLOOD BY AUTOMATED COUNT: 15.5 % (ref 9.6–15.2)
LYMPHOCYTES # BLD AUTO: 1.6 X10^3/UL (ref 1–3.4)
LYMPHOCYTES NFR BLD AUTO: 20 % (ref 22–44)
MCH RBC QN AUTO: 25.9 PG (ref 27–34.8)
MCHC RBC AUTO-ENTMCNC: 33 G/DL (ref 32.4–35.8)
MD: NO
MONOCYTES # BLD AUTO: 0.6 X10^3/UL (ref 0.2–0.8)
MONOCYTES NFR BLD AUTO: 8 % (ref 2–9)
NEUTROPHILS # BLD AUTO: 5.6 X10^3/UL (ref 1.8–6.8)
NEUTROPHILS NFR BLD AUTO: 71 % (ref 42–75)
PLATELET # BLD AUTO: 220 X10^3/UL (ref 130–400)
PMV BLD AUTO: 9.4 FL (ref 7.4–10.4)
RBC # BLD AUTO: 4.05 X10^6/UL (ref 3.82–5.3)

## 2021-02-25 RX ADMIN — SODIUM CHLORIDE, SODIUM LACTATE, POTASSIUM CHLORIDE, AND CALCIUM CHLORIDE SCH MLS/HR: .6; .31; .03; .02 INJECTION, SOLUTION INTRAVENOUS at 18:57

## 2021-02-25 RX ADMIN — SODIUM CHLORIDE, SODIUM LACTATE, POTASSIUM CHLORIDE, AND CALCIUM CHLORIDE SCH MLS/HR: .6; .31; .03; .02 INJECTION, SOLUTION INTRAVENOUS at 14:53

## 2021-02-25 RX ADMIN — SODIUM CHLORIDE, SODIUM LACTATE, POTASSIUM CHLORIDE, AND CALCIUM CHLORIDE SCH MLS/HR: .6; .31; .03; .02 INJECTION, SOLUTION INTRAVENOUS at 08:40

## 2021-02-25 RX ADMIN — SODIUM CHLORIDE, SODIUM LACTATE, POTASSIUM CHLORIDE, AND CALCIUM CHLORIDE SCH MLS/HR: .6; .31; .03; .02 INJECTION, SOLUTION INTRAVENOUS at 21:36

## 2021-02-26 VITALS — DIASTOLIC BLOOD PRESSURE: 64 MMHG | SYSTOLIC BLOOD PRESSURE: 107 MMHG

## 2021-02-26 VITALS — SYSTOLIC BLOOD PRESSURE: 117 MMHG | DIASTOLIC BLOOD PRESSURE: 72 MMHG

## 2021-02-26 VITALS — SYSTOLIC BLOOD PRESSURE: 99 MMHG | DIASTOLIC BLOOD PRESSURE: 64 MMHG

## 2021-02-26 VITALS — SYSTOLIC BLOOD PRESSURE: 105 MMHG | DIASTOLIC BLOOD PRESSURE: 63 MMHG

## 2021-02-26 LAB
BASOPHILS # BLD AUTO: 0 X10^3/UL (ref 0–0.1)
BASOPHILS NFR BLD AUTO: 0 % (ref 0–1)
EOSINOPHIL # BLD AUTO: 0 X10^3/UL (ref 0–0.4)
EOSINOPHIL NFR BLD AUTO: 0 % (ref 1–7)
ERYTHROCYTE [DISTWIDTH] IN BLOOD BY AUTOMATED COUNT: 15.9 % (ref 9.6–15.2)
LYMPHOCYTES # BLD AUTO: 1.5 X10^3/UL (ref 1–3.4)
LYMPHOCYTES NFR BLD AUTO: 10 % (ref 22–44)
MCH RBC QN AUTO: 25.5 PG (ref 27–34.8)
MCHC RBC AUTO-ENTMCNC: 32.4 G/DL (ref 32.4–35.8)
MD: NO
MONOCYTES # BLD AUTO: 1.3 X10^3/UL (ref 0.2–0.8)
MONOCYTES NFR BLD AUTO: 8 % (ref 2–9)
NEUTROPHILS # BLD AUTO: 12.8 X10^3/UL (ref 1.8–6.8)
NEUTROPHILS NFR BLD AUTO: 82 % (ref 42–75)
PLATELET # BLD AUTO: 230 X10^3/UL (ref 130–400)
PMV BLD AUTO: 10.1 FL (ref 7.4–10.4)
RBC # BLD AUTO: 3.95 X10^6/UL (ref 3.82–5.3)

## 2021-02-26 PROCEDURE — 3E033VJ INTRODUCTION OF OTHER HORMONE INTO PERIPHERAL VEIN, PERCUTANEOUS APPROACH: ICD-10-PCS | Performed by: OBSTETRICS & GYNECOLOGY

## 2021-02-26 PROCEDURE — 10H07YZ INSERTION OF OTHER DEVICE INTO PRODUCTS OF CONCEPTION, VIA NATURAL OR ARTIFICIAL OPENING: ICD-10-PCS | Performed by: OBSTETRICS & GYNECOLOGY

## 2021-02-26 PROCEDURE — 10907ZC DRAINAGE OF AMNIOTIC FLUID, THERAPEUTIC FROM PRODUCTS OF CONCEPTION, VIA NATURAL OR ARTIFICIAL OPENING: ICD-10-PCS | Performed by: OBSTETRICS & GYNECOLOGY

## 2021-02-26 PROCEDURE — 0UQMXZZ REPAIR VULVA, EXTERNAL APPROACH: ICD-10-PCS | Performed by: OBSTETRICS & GYNECOLOGY

## 2021-02-26 RX ADMIN — Medication SCH MLS/HR: at 20:00

## 2021-02-26 RX ADMIN — FENTANYL CITRATE PRN MCG: 50 INJECTION INTRAMUSCULAR; INTRAVENOUS at 08:50

## 2021-02-26 RX ADMIN — SODIUM CHLORIDE, SODIUM LACTATE, POTASSIUM CHLORIDE, AND CALCIUM CHLORIDE SCH MLS/HR: .6; .31; .03; .02 INJECTION, SOLUTION INTRAVENOUS at 07:48

## 2021-02-26 RX ADMIN — FENTANYL CITRATE PRN MCG: 50 INJECTION INTRAMUSCULAR; INTRAVENOUS at 05:48

## 2021-02-26 RX ADMIN — FENTANYL CITRATE PRN MCG: 50 INJECTION INTRAMUSCULAR; INTRAVENOUS at 06:58

## 2021-02-26 RX ADMIN — IBUPROFEN PRN MG: 600 TABLET ORAL at 10:01

## 2021-02-26 RX ADMIN — IBUPROFEN PRN MG: 600 TABLET ORAL at 23:51

## 2021-02-26 RX ADMIN — Medication SCH MLS/HR: at 10:02

## 2021-02-26 RX ADMIN — FENTANYL CITRATE PRN MCG: 50 INJECTION INTRAMUSCULAR; INTRAVENOUS at 07:46

## 2021-02-27 VITALS — SYSTOLIC BLOOD PRESSURE: 95 MMHG | DIASTOLIC BLOOD PRESSURE: 57 MMHG

## 2021-02-27 VITALS — SYSTOLIC BLOOD PRESSURE: 98 MMHG | DIASTOLIC BLOOD PRESSURE: 61 MMHG

## 2021-02-27 VITALS — DIASTOLIC BLOOD PRESSURE: 67 MMHG | SYSTOLIC BLOOD PRESSURE: 111 MMHG

## 2021-02-27 RX ADMIN — Medication SCH MLS/HR: at 06:00

## 2021-02-27 RX ADMIN — SODIUM CHLORIDE, SODIUM LACTATE, POTASSIUM CHLORIDE, AND CALCIUM CHLORIDE SCH MLS/HR: .6; .31; .03; .02 INJECTION, SOLUTION INTRAVENOUS at 08:30

## 2021-02-27 RX ADMIN — SODIUM CHLORIDE, SODIUM LACTATE, POTASSIUM CHLORIDE, AND CALCIUM CHLORIDE SCH MLS/HR: .6; .31; .03; .02 INJECTION, SOLUTION INTRAVENOUS at 00:30

## 2021-04-28 ENCOUNTER — OFFICE VISIT (OUTPATIENT)
Dept: URGENT CARE | Facility: CLINIC | Age: 41
End: 2021-04-28
Payer: MEDICAID

## 2021-04-28 ENCOUNTER — HOSPITAL ENCOUNTER (OUTPATIENT)
Facility: MEDICAL CENTER | Age: 41
End: 2021-04-28
Attending: PHYSICIAN ASSISTANT
Payer: MEDICAID

## 2021-04-28 VITALS
SYSTOLIC BLOOD PRESSURE: 102 MMHG | BODY MASS INDEX: 35.15 KG/M2 | WEIGHT: 191 LBS | HEIGHT: 62 IN | TEMPERATURE: 97.5 F | RESPIRATION RATE: 16 BRPM | DIASTOLIC BLOOD PRESSURE: 60 MMHG | HEART RATE: 79 BPM | OXYGEN SATURATION: 98 %

## 2021-04-28 DIAGNOSIS — M54.50 LUMBAR PAIN: ICD-10-CM

## 2021-04-28 DIAGNOSIS — M54.9 UPPER BACK PAIN: ICD-10-CM

## 2021-04-28 PROCEDURE — 99214 OFFICE O/P EST MOD 30 MIN: CPT | Performed by: PHYSICIAN ASSISTANT

## 2021-04-28 PROCEDURE — U0003 INFECTIOUS AGENT DETECTION BY NUCLEIC ACID (DNA OR RNA); SEVERE ACUTE RESPIRATORY SYNDROME CORONAVIRUS 2 (SARS-COV-2) (CORONAVIRUS DISEASE [COVID-19]), AMPLIFIED PROBE TECHNIQUE, MAKING USE OF HIGH THROUGHPUT TECHNOLOGIES AS DESCRIBED BY CMS-2020-01-R: HCPCS

## 2021-04-28 PROCEDURE — U0005 INFEC AGEN DETEC AMPLI PROBE: HCPCS

## 2021-04-28 RX ORDER — KETOROLAC TROMETHAMINE 30 MG/ML
30 INJECTION, SOLUTION INTRAMUSCULAR; INTRAVENOUS ONCE
Status: COMPLETED | OUTPATIENT
Start: 2021-04-28 | End: 2021-04-28

## 2021-04-28 RX ORDER — KETOROLAC TROMETHAMINE 30 MG/ML
30 INJECTION, SOLUTION INTRAMUSCULAR; INTRAVENOUS ONCE
Status: DISCONTINUED | OUTPATIENT
Start: 2021-04-28 | End: 2021-04-28

## 2021-04-28 RX ADMIN — KETOROLAC TROMETHAMINE 30 MG: 30 INJECTION, SOLUTION INTRAMUSCULAR; INTRAVENOUS at 19:48

## 2021-04-28 ASSESSMENT — ENCOUNTER SYMPTOMS
FEVER: 0
SORE THROAT: 0
BACK PAIN: 1
PAIN: 1
DIZZINESS: 0
COUGH: 0
GASTROINTESTINAL NEGATIVE: 1
CARDIOVASCULAR NEGATIVE: 1
NUMBNESS: 0
CHILLS: 0
ABDOMINAL PAIN: 0
WEAKNESS: 0
MYALGIAS: 1
TINGLING: 1
SHORTNESS OF BREATH: 0
WHEEZING: 0

## 2021-04-28 ASSESSMENT — FIBROSIS 4 INDEX: FIB4 SCORE: 1.07

## 2021-04-28 NOTE — LETTER
April 28, 2021         Patient: Mary Gold   YOB: 1980   Date of Visit: 4/28/2021           To Whom it May Concern:    Mary Gold was seen in my clinic on 4/28/2021. She she is excuse from work for 2 days.    If you have any questions or concerns, please don't hesitate to call.        Sincerely,           Allen Rice P.A.-C.  Electronically Signed

## 2021-04-28 NOTE — LETTER
April 28, 2021         Patient: Mary Gold   YOB: 1980   Date of Visit: 4/28/2021           To Whom it May Concern:    Mary Gold was seen in my clinic on 4/28/2021. She is excused from work for 2 days.      If you have any questions or concerns, please don't hesitate to call.        Sincerely,           Allen Rice P.A.-C.  Electronically Signed

## 2021-04-28 NOTE — LETTER
April 28, 2021         Patient: Mary Gold   YOB: 1980   Date of Visit: 4/28/2021           To Whom it May Concern:    Mary Gold was seen in my clinic on 4/28/2021. She is n    If you have any questions or concerns, please don't hesitate to call.        Sincerely,           Allen Rice P.A.-C.  Electronically Signed

## 2021-04-29 DIAGNOSIS — M54.9 UPPER BACK PAIN: ICD-10-CM

## 2021-04-29 LAB
COVID ORDER STATUS COVID19: NORMAL
SARS-COV-2 RNA RESP QL NAA+PROBE: NOTDETECTED
SPECIMEN SOURCE: NORMAL

## 2021-04-29 NOTE — PROGRESS NOTES
"Subjective:      Mary Gold is a 40 y.o. female who presents with Body Aches (back mostly ( patient stated that she have back pain ever since she gave birth 2 month ago ) )            Upper back pain coming and going for 2 months since giving birth.  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  She does note some \"tiredness\" in her hands and feet.  Of note she does have a history of Chiari malformation with subsequent hand numbness, tingling and weakness.  This was surgically repaired and has not had problems since.  Work is requiring a negative Covid test before she can return.    Pain  This is a recurrent problem. The current episode started more than 1 year ago. The problem occurs daily. The problem has been waxing and waning. Associated symptoms include myalgias. Pertinent negatives include no abdominal pain, chills, congestion, coughing, fever, numbness, sore throat, urinary symptoms or weakness. Nothing aggravates the symptoms. She has tried nothing for the symptoms. The treatment provided no relief.       PMH:  has a past medical history of Chiari malformation, Dizziness, Fatigue, Heart burn, Indigestion, Numbness and tingling in hands, Other acute pain, and Unspecified hemorrhagic conditions. She also has no past medical history of COPD or Psychiatric disorder.  MEDS:   Current Outpatient Medications:   •  acetaminophen (TYLENOL) 325 MG Tab, Take 325 mg by mouth every 6 hours as needed., Disp: , Rfl:   •  Prenatal Vit-Fe Fumarate-FA (PRENATAL PO), Take 1 Tab by mouth every day., Disp: , Rfl:   ALLERGIES: No Known Allergies  SURGHX:   Past Surgical History:   Procedure Laterality Date   • CRANIECTOMY  8/17/2015    Procedure: SUBOCCIPITAL CRANIECTOMY ;  Surgeon: Tenzin Curtis M.D.;  Location: SURGERY [facility];  Service:    • CERVICAL LAMINECTOMY POSTERIOR  8/17/2015    Procedure: CERVICAL LAMINECTOMY POSTERIOR C1, " "DUROPLASTY;  Surgeon: Tenzin Curtis M.D.;  Location: SURGERY [facility];  Service:    • GYN SURGERY     • OTHER      eye     SOCHX:  reports that she has never smoked. She has never used smokeless tobacco. She reports previous alcohol use. She reports that she does not use drugs.  FH: family history is not on file.    Review of Systems   Constitutional: Negative for chills and fever.   HENT: Negative for congestion and sore throat.    Respiratory: Negative for cough, shortness of breath and wheezing.    Cardiovascular: Negative.    Gastrointestinal: Negative.  Negative for abdominal pain.   Genitourinary: Negative.    Musculoskeletal: Positive for back pain and myalgias.   Neurological: Positive for tingling. Negative for dizziness, weakness and numbness.       Medications, Allergies, and current problem list reviewed today in Epic     Objective:     /60 (BP Location: Left arm, Patient Position: Sitting, BP Cuff Size: Adult)   Pulse 79   Temp 36.4 °C (97.5 °F) (Temporal)   Resp 16   Ht 1.575 m (5' 2\")   Wt 86.6 kg (191 lb)   LMP 05/19/2020   SpO2 98%   BMI 34.93 kg/m²      Physical Exam  Vitals and nursing note reviewed.   Constitutional:       General: She is not in acute distress.     Appearance: She is well-developed. She is not diaphoretic.   HENT:      Head: Normocephalic and atraumatic.   Eyes:      Conjunctiva/sclera: Conjunctivae normal.   Cardiovascular:      Rate and Rhythm: Normal rate and regular rhythm.      Heart sounds: Normal heart sounds.   Pulmonary:      Effort: Pulmonary effort is normal. No respiratory distress.      Breath sounds: Normal breath sounds. No wheezing, rhonchi or rales.   Musculoskeletal:      Cervical back: Normal range of motion and neck supple.      Comments: Generalized upper back muscular tenderness.  No deformities, ecchymosis, masses felt.  No midline tenderness.  Range of motion normal.  Upper extremity strength normal.  Distal neurovascular intact. "   Skin:     General: Skin is warm and dry.   Neurological:      Mental Status: She is alert and oriented to person, place, and time.   Psychiatric:         Behavior: Behavior normal.         Thought Content: Thought content normal.         Judgment: Judgment normal.                 Assessment/Plan:         1. Upper back pain  SARS-CoV-2, PCR (In-House): Collect NP OR nasal swab in VTM    ketorolac (TORADOL) injection 30 mg    DISCONTINUED: ketorolac (TORADOL) injection 30 mg   2. Lumbar pain  ketorolac (TORADOL) injection 30 mg    DISCONTINUED: ketorolac (TORADOL) injection 30 mg     Generalized upper back pain radiating to the lumbar spine.  Denies fever, chills, cough, congestion, chest pain, palpitations.  Work is requesting a Covid test to return.  This was ordered and was negative.  Her exam shows generalized muscular tenderness.  Vital signs normal.  No red flag symptoms.  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  Toradol given in clinic, no adverse reaction  OTC meds and conservative measures as discussed      Return to clinic or go to ED if symptoms worsen or persist. Indications for ED discussed at length. Patient/Parent/Guardian voices understanding. Follow-up with your primary care provider in 3-5 days. Red flag symptoms discussed. All side effects of medication discussed including allergic response, GI upset, tendon injury, rash, sedation etc.    Please note that this dictation was created using voice recognition software. I have made every reasonable attempt to correct obvious errors, but I expect that there are errors of grammar and possibly content that I did not discover before finalizing the note.

## 2021-05-10 ENCOUNTER — OFFICE VISIT (OUTPATIENT)
Dept: URGENT CARE | Facility: CLINIC | Age: 41
End: 2021-05-10
Payer: MEDICAID

## 2021-05-10 VITALS
HEART RATE: 85 BPM | RESPIRATION RATE: 18 BRPM | BODY MASS INDEX: 36.25 KG/M2 | WEIGHT: 197 LBS | SYSTOLIC BLOOD PRESSURE: 118 MMHG | HEIGHT: 62 IN | TEMPERATURE: 97.7 F | DIASTOLIC BLOOD PRESSURE: 70 MMHG | OXYGEN SATURATION: 96 %

## 2021-05-10 DIAGNOSIS — Z86.69 HISTORY OF CHIARI MALFORMATION: ICD-10-CM

## 2021-05-10 DIAGNOSIS — M54.9 UPPER BACK PAIN: ICD-10-CM

## 2021-05-10 PROCEDURE — 99214 OFFICE O/P EST MOD 30 MIN: CPT | Performed by: PHYSICIAN ASSISTANT

## 2021-05-10 RX ORDER — KETOROLAC TROMETHAMINE 30 MG/ML
60 INJECTION, SOLUTION INTRAMUSCULAR; INTRAVENOUS ONCE
Status: COMPLETED | OUTPATIENT
Start: 2021-05-10 | End: 2021-05-10

## 2021-05-10 RX ADMIN — KETOROLAC TROMETHAMINE 60 MG: 30 INJECTION, SOLUTION INTRAMUSCULAR; INTRAVENOUS at 22:53

## 2021-05-10 ASSESSMENT — FIBROSIS 4 INDEX: FIB4 SCORE: 1.07

## 2021-05-12 ASSESSMENT — ENCOUNTER SYMPTOMS
FEVER: 0
BOWEL INCONTINENCE: 0
BACK PAIN: 1
CARDIOVASCULAR NEGATIVE: 1
CONSTITUTIONAL NEGATIVE: 1
RESPIRATORY NEGATIVE: 1
GASTROINTESTINAL NEGATIVE: 1
NEUROLOGICAL NEGATIVE: 1
NUMBNESS: 0
TINGLING: 0
FALLS: 0
PERIANAL NUMBNESS: 0
PARESIS: 0
ABDOMINAL PAIN: 0

## 2021-05-12 NOTE — PROGRESS NOTES
"Subjective:      Mary Gold is a 40 y.o. female who presents with Back Pain (Pt needs referral, worse. ([facility] [address]))            Second visit to urgent care.  Previously received a Toradol injection with good relief.  She is requesting a referral to [facility] orthopedics.    Upper back pain coming and going for 2 months since giving birth.  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  She does note some \"tiredness\" in her hands and feet.  Of note she does have a history of Chiari malformation with subsequent hand numbness, tingling and weakness.  This was surgically repaired and has not had problems since.    Back Pain  This is a recurrent problem. The current episode started more than 1 month ago. The problem occurs constantly. The problem has been waxing and waning since onset. The pain is present in the thoracic spine. The quality of the pain is described as aching and cramping. The pain does not radiate. Pertinent negatives include no abdominal pain, bladder incontinence, bowel incontinence, chest pain, dysuria, fever, numbness, paresis, perianal numbness or tingling. She has tried NSAIDs for the symptoms. The treatment provided mild relief.       PMH:  has a past medical history of Chiari malformation, Dizziness, Fatigue, Heart burn, Indigestion, Numbness and tingling in hands, Other acute pain, and Unspecified hemorrhagic conditions. She also has no past medical history of COPD or Psychiatric disorder.  MEDS:   Current Outpatient Medications:   •  Prenatal Vit-Fe Fumarate-FA (PRENATAL PO), Take 1 Tab by mouth every day., Disp: , Rfl:   •  acetaminophen (TYLENOL) 325 MG Tab, Take 325 mg by mouth every 6 hours as needed., Disp: , Rfl:   ALLERGIES: No Known Allergies  SURGHX:   Past Surgical History:   Procedure Laterality Date   • CRANIECTOMY  8/17/2015    Procedure: SUBOCCIPITAL CRANIECTOMY ;  Surgeon: Tenzin Curtis M.D.;  " "Location: SURGERY [facility];  Service:    • CERVICAL LAMINECTOMY POSTERIOR  8/17/2015    Procedure: CERVICAL LAMINECTOMY POSTERIOR C1, DUROPLASTY;  Surgeon: Tenzin Curtis M.D.;  Location: SURGERY [facility];  Service:    • GYN SURGERY     • OTHER      eye     SOCHX:  reports that she has never smoked. She has never used smokeless tobacco. She reports previous alcohol use. She reports that she does not use drugs.  FH: family history is not on file.    Review of Systems   Constitutional: Negative.  Negative for fever.   Respiratory: Negative.    Cardiovascular: Negative.  Negative for chest pain.   Gastrointestinal: Negative.  Negative for abdominal pain and bowel incontinence.   Genitourinary: Negative.  Negative for bladder incontinence and dysuria.   Musculoskeletal: Positive for back pain. Negative for falls.   Neurological: Negative.  Negative for tingling and numbness.       Medications, Allergies, and current problem list reviewed today in Epic     Objective:     /70   Pulse 85   Temp 36.5 °C (97.7 °F)   Resp 18   Ht 1.575 m (5' 2\")   Wt 89.4 kg (197 lb)   LMP 05/19/2020   SpO2 96%   BMI 36.03 kg/m²      Physical Exam  Vitals and nursing note reviewed.   Constitutional:       General: She is not in acute distress.     Appearance: She is well-developed. She is not diaphoretic.   HENT:      Head: Normocephalic and atraumatic.   Eyes:      Conjunctiva/sclera: Conjunctivae normal.   Cardiovascular:      Rate and Rhythm: Normal rate and regular rhythm.      Heart sounds: Normal heart sounds.   Pulmonary:      Effort: Pulmonary effort is normal. No respiratory distress.      Breath sounds: Normal breath sounds. No wheezing.   Musculoskeletal:      Cervical back: Normal, normal range of motion and neck supple.      Thoracic back: Tenderness present. No swelling, edema, deformity, signs of trauma, spasms or bony tenderness. Normal range of motion.      Lumbar back: Normal.        " Back:    Skin:     General: Skin is warm and dry.   Neurological:      Mental Status: She is alert and oriented to person, place, and time.   Psychiatric:         Behavior: Behavior normal.         Thought Content: Thought content normal.         Judgment: Judgment normal.                        Assessment/Plan:         1. Upper back pain  REFERRAL TO ORTHOPEDICS    ketorolac (TORADOL) injection 60 mg   2. History of Chiari malformation  REFERRAL TO ORTHOPEDICS     Ongoing upper back pain for months.  No acute changes since her previous urgent care visit.  Vital signs normal, no red flag symptoms.  Unclear if her history of Chiari malformation contributes to her current presentation.  She was given another Toradol injection in clinic, tolerated well  Referral to [facility] orthopedics per patient request  OTC meds and conservative measures as discussed    Return to clinic or go to ED if symptoms worsen or persist. Indications for ED discussed at length. Patient/Parent/Guardian voices understanding. Follow-up with your primary care provider in 3-5 days. Red flag symptoms discussed. All side effects of medication discussed including allergic response, GI upset, tendon injury, rash, sedation etc.    Please note that this dictation was created using voice recognition software. I have made every reasonable attempt to correct obvious errors, but I expect that there are errors of grammar and possibly content that I did not discover before finalizing the note.

## 2021-07-07 ENCOUNTER — HOSPITAL ENCOUNTER (OUTPATIENT)
Dept: HOSPITAL 8 - OR | Age: 41
Discharge: HOME | End: 2021-07-07
Attending: OBSTETRICS & GYNECOLOGY
Payer: MEDICAID

## 2021-07-07 VITALS — SYSTOLIC BLOOD PRESSURE: 106 MMHG | DIASTOLIC BLOOD PRESSURE: 67 MMHG

## 2021-07-07 VITALS — WEIGHT: 197.98 LBS | HEIGHT: 62 IN | BODY MASS INDEX: 36.43 KG/M2

## 2021-07-07 DIAGNOSIS — Z30.2: Primary | ICD-10-CM

## 2021-07-07 DIAGNOSIS — Z72.89: ICD-10-CM

## 2021-07-07 DIAGNOSIS — G43.909: ICD-10-CM

## 2021-07-07 DIAGNOSIS — Z98.890: ICD-10-CM

## 2021-07-07 DIAGNOSIS — N83.292: ICD-10-CM

## 2021-07-07 DIAGNOSIS — Z20.822: ICD-10-CM

## 2021-07-07 LAB
BASOPHILS # BLD AUTO: 0.1 X10^3/UL (ref 0–0.1)
BASOPHILS NFR BLD AUTO: 1 % (ref 0–1)
EOSINOPHIL # BLD AUTO: 0.1 X10^3/UL (ref 0–0.4)
EOSINOPHIL NFR BLD AUTO: 1 % (ref 1–7)
ERYTHROCYTE [DISTWIDTH] IN BLOOD BY AUTOMATED COUNT: 13.8 % (ref 9.6–15.2)
HCG UR SG: 1.02 (ref 1–1.03)
LYMPHOCYTES # BLD AUTO: 2.3 X10^3/UL (ref 1–3.4)
LYMPHOCYTES NFR BLD AUTO: 32 % (ref 22–44)
MCH RBC QN AUTO: 26.3 PG (ref 27–34.8)
MCHC RBC AUTO-ENTMCNC: 32.6 G/DL (ref 32.4–35.8)
MONOCYTES # BLD AUTO: 0.6 X10^3/UL (ref 0.2–0.8)
MONOCYTES NFR BLD AUTO: 8 % (ref 2–9)
NEUTROPHILS # BLD AUTO: 4.3 X10^3/UL (ref 1.8–6.8)
NEUTROPHILS NFR BLD AUTO: 59 % (ref 42–75)
PLATELET # BLD AUTO: 212 X10^3/UL (ref 130–400)
PMV BLD AUTO: 9.6 FL (ref 7.4–10.4)
RBC # BLD AUTO: 4.94 X10^6/UL (ref 3.82–5.3)

## 2021-07-07 PROCEDURE — 87635 SARS-COV-2 COVID-19 AMP PRB: CPT

## 2021-07-07 PROCEDURE — 88302 TISSUE EXAM BY PATHOLOGIST: CPT

## 2021-07-07 PROCEDURE — 81025 URINE PREGNANCY TEST: CPT

## 2021-07-07 PROCEDURE — 85025 COMPLETE CBC W/AUTO DIFF WBC: CPT

## 2021-07-07 PROCEDURE — 58662 LAPAROSCOPY EXCISE LESIONS: CPT

## 2021-07-07 PROCEDURE — 58670 LAPAROSCOPY TUBAL CAUTERY: CPT

## 2021-07-07 PROCEDURE — 36415 COLL VENOUS BLD VENIPUNCTURE: CPT

## 2021-07-14 ENCOUNTER — HOSPITAL ENCOUNTER (EMERGENCY)
Dept: HOSPITAL 8 - ED | Age: 41
Discharge: HOME | End: 2021-07-14
Payer: MEDICAID

## 2021-07-14 VITALS — DIASTOLIC BLOOD PRESSURE: 59 MMHG | SYSTOLIC BLOOD PRESSURE: 109 MMHG

## 2021-07-14 VITALS — BODY MASS INDEX: 37 KG/M2 | HEIGHT: 62 IN | WEIGHT: 201.06 LBS

## 2021-07-14 DIAGNOSIS — R42: ICD-10-CM

## 2021-07-14 DIAGNOSIS — R21: ICD-10-CM

## 2021-07-14 DIAGNOSIS — R94.31: ICD-10-CM

## 2021-07-14 DIAGNOSIS — R10.33: Primary | ICD-10-CM

## 2021-07-14 LAB
ALBUMIN SERPL-MCNC: 3.1 G/DL (ref 3.4–5)
ANION GAP SERPL CALC-SCNC: 9 MMOL/L (ref 5–15)
BASOPHILS # BLD AUTO: 0.1 X10^3/UL (ref 0–0.1)
BASOPHILS NFR BLD AUTO: 1 % (ref 0–1)
CALCIUM SERPL-MCNC: 8.5 MG/DL (ref 8.5–10.1)
CHLORIDE SERPL-SCNC: 105 MMOL/L (ref 98–107)
CREAT SERPL-MCNC: 0.71 MG/DL (ref 0.55–1.02)
EOSINOPHIL # BLD AUTO: 0.1 X10^3/UL (ref 0–0.4)
EOSINOPHIL NFR BLD AUTO: 1 % (ref 1–7)
ERYTHROCYTE [DISTWIDTH] IN BLOOD BY AUTOMATED COUNT: 13.3 % (ref 9.6–15.2)
LYMPHOCYTES # BLD AUTO: 2.4 X10^3/UL (ref 1–3.4)
LYMPHOCYTES NFR BLD AUTO: 28 % (ref 22–44)
MCH RBC QN AUTO: 26.4 PG (ref 27–34.8)
MCHC RBC AUTO-ENTMCNC: 33.4 G/DL (ref 32.4–35.8)
MICROSCOPIC: (no result)
MONOCYTES # BLD AUTO: 0.6 X10^3/UL (ref 0.2–0.8)
MONOCYTES NFR BLD AUTO: 7 % (ref 2–9)
NEUTROPHILS # BLD AUTO: 5.3 X10^3/UL (ref 1.8–6.8)
NEUTROPHILS NFR BLD AUTO: 62 % (ref 42–75)
PLATELET # BLD AUTO: 209 X10^3/UL (ref 130–400)
PMV BLD AUTO: 9.7 FL (ref 7.4–10.4)
RBC # BLD AUTO: 4.9 X10^6/UL (ref 3.82–5.3)

## 2021-07-14 PROCEDURE — 82040 ASSAY OF SERUM ALBUMIN: CPT

## 2021-07-14 PROCEDURE — 85025 COMPLETE CBC W/AUTO DIFF WBC: CPT

## 2021-07-14 PROCEDURE — 96361 HYDRATE IV INFUSION ADD-ON: CPT

## 2021-07-14 PROCEDURE — 99285 EMERGENCY DEPT VISIT HI MDM: CPT

## 2021-07-14 PROCEDURE — 36415 COLL VENOUS BLD VENIPUNCTURE: CPT

## 2021-07-14 PROCEDURE — 80048 BASIC METABOLIC PNL TOTAL CA: CPT

## 2021-07-14 PROCEDURE — 81001 URINALYSIS AUTO W/SCOPE: CPT

## 2021-07-14 PROCEDURE — 93005 ELECTROCARDIOGRAM TRACING: CPT

## 2021-07-14 PROCEDURE — 96360 HYDRATION IV INFUSION INIT: CPT

## 2021-07-14 NOTE — NUR
PT OK FOR D/C PER ERMD MS. PT VERBALIZED UNDERSTANDING OF D/C INSTRUCTIONS. PT 
HAS ALL OWN BELONGINGS UPON D/C.

## 2021-07-14 NOTE — NUR
PT AGREED TO ATTEMPT URINE SAMPLE. AMBULATORY W/ A STEADY GAIT. URINE COLLECTED 
AND SENT TO LAB. PT RETURNED TO ROOM W/O INCIDENT. RESP EVEN AND UNLABORED, 
MILTON.

## 2021-07-14 NOTE — NUR
REPORT FROM SUBHASH ROBERSON. PT RESTING IN BED, DENIES NEED. PT REMAINS ON MONITORS, 
VSS. AWAITING UA RESULTS. CONT TO MONITOR.

## 2021-07-14 NOTE — NUR
THIS IS A 39 YO F W/ C/O SURGICAL SITE PAIN S/P OVARIAN CYST REMOVAL X1 WEEK 
AGO. NO REDNESS, DRAINAGE OBSERVED. VSS, NADN. PIV STARTED, LABS DRAWN. PT 
STATES UNABLE TO PROVIDE URINE SAMPLE AT THIS TIME.

## 2021-07-14 NOTE — NUR
ENCOURAGED PT TO TRY FOR URINE SAMPLE S/P 1L NS BOLUS. PT STATES "I DON'T WANT 
TO USE THE RESTROOM".

## 2021-08-06 PROCEDURE — 99284 EMERGENCY DEPT VISIT MOD MDM: CPT

## 2021-08-06 PROCEDURE — 96374 THER/PROPH/DIAG INJ IV PUSH: CPT

## 2021-08-06 PROCEDURE — 96375 TX/PRO/DX INJ NEW DRUG ADDON: CPT

## 2021-08-06 ASSESSMENT — FIBROSIS 4 INDEX: FIB4 SCORE: 1.07

## 2021-08-07 ENCOUNTER — HOSPITAL ENCOUNTER (EMERGENCY)
Facility: MEDICAL CENTER | Age: 41
End: 2021-08-07
Attending: EMERGENCY MEDICINE
Payer: MEDICAID

## 2021-08-07 ENCOUNTER — APPOINTMENT (OUTPATIENT)
Dept: RADIOLOGY | Facility: MEDICAL CENTER | Age: 41
End: 2021-08-07
Attending: EMERGENCY MEDICINE
Payer: MEDICAID

## 2021-08-07 VITALS
DIASTOLIC BLOOD PRESSURE: 57 MMHG | TEMPERATURE: 96.8 F | WEIGHT: 204.15 LBS | OXYGEN SATURATION: 95 % | SYSTOLIC BLOOD PRESSURE: 100 MMHG | HEART RATE: 65 BPM | BODY MASS INDEX: 37.57 KG/M2 | HEIGHT: 62 IN | RESPIRATION RATE: 16 BRPM

## 2021-08-07 DIAGNOSIS — R42 DIZZINESS: ICD-10-CM

## 2021-08-07 DIAGNOSIS — R74.8 ELEVATED LIVER ENZYMES: ICD-10-CM

## 2021-08-07 DIAGNOSIS — R51.9 NONINTRACTABLE EPISODIC HEADACHE, UNSPECIFIED HEADACHE TYPE: ICD-10-CM

## 2021-08-07 LAB
ALBUMIN SERPL BCP-MCNC: 4 G/DL (ref 3.2–4.9)
ALBUMIN/GLOB SERPL: 1.2 G/DL
ALP SERPL-CCNC: 148 U/L (ref 30–99)
ALT SERPL-CCNC: 53 U/L (ref 2–50)
ANION GAP SERPL CALC-SCNC: 13 MMOL/L (ref 7–16)
AST SERPL-CCNC: 58 U/L (ref 12–45)
BASOPHILS # BLD AUTO: 0.7 % (ref 0–1.8)
BASOPHILS # BLD: 0.05 K/UL (ref 0–0.12)
BILIRUB SERPL-MCNC: 0.2 MG/DL (ref 0.1–1.5)
BUN SERPL-MCNC: 15 MG/DL (ref 8–22)
CALCIUM SERPL-MCNC: 9 MG/DL (ref 8.5–10.5)
CHLORIDE SERPL-SCNC: 104 MMOL/L (ref 96–112)
CO2 SERPL-SCNC: 23 MMOL/L (ref 20–33)
CREAT SERPL-MCNC: 0.7 MG/DL (ref 0.5–1.4)
EKG IMPRESSION: NORMAL
EOSINOPHIL # BLD AUTO: 0.11 K/UL (ref 0–0.51)
EOSINOPHIL NFR BLD: 1.6 % (ref 0–6.9)
ERYTHROCYTE [DISTWIDTH] IN BLOOD BY AUTOMATED COUNT: 38.5 FL (ref 35.9–50)
GLOBULIN SER CALC-MCNC: 3.4 G/DL (ref 1.9–3.5)
GLUCOSE SERPL-MCNC: 130 MG/DL (ref 65–99)
HCT VFR BLD AUTO: 41.9 % (ref 37–47)
HGB BLD-MCNC: 13.2 G/DL (ref 12–16)
IMM GRANULOCYTES # BLD AUTO: 0.02 K/UL (ref 0–0.11)
IMM GRANULOCYTES NFR BLD AUTO: 0.3 % (ref 0–0.9)
LYMPHOCYTES # BLD AUTO: 2.4 K/UL (ref 1–4.8)
LYMPHOCYTES NFR BLD: 35.6 % (ref 22–41)
MCH RBC QN AUTO: 25.5 PG (ref 27–33)
MCHC RBC AUTO-ENTMCNC: 31.5 G/DL (ref 33.6–35)
MCV RBC AUTO: 80.9 FL (ref 81.4–97.8)
MONOCYTES # BLD AUTO: 0.5 K/UL (ref 0–0.85)
MONOCYTES NFR BLD AUTO: 7.4 % (ref 0–13.4)
NEUTROPHILS # BLD AUTO: 3.66 K/UL (ref 2–7.15)
NEUTROPHILS NFR BLD: 54.4 % (ref 44–72)
NRBC # BLD AUTO: 0 K/UL
NRBC BLD-RTO: 0 /100 WBC
PLATELET # BLD AUTO: 206 K/UL (ref 164–446)
PMV BLD AUTO: 12.4 FL (ref 9–12.9)
POTASSIUM SERPL-SCNC: 3.7 MMOL/L (ref 3.6–5.5)
PROT SERPL-MCNC: 7.4 G/DL (ref 6–8.2)
RBC # BLD AUTO: 5.18 M/UL (ref 4.2–5.4)
SODIUM SERPL-SCNC: 140 MMOL/L (ref 135–145)
WBC # BLD AUTO: 6.7 K/UL (ref 4.8–10.8)

## 2021-08-07 PROCEDURE — 96375 TX/PRO/DX INJ NEW DRUG ADDON: CPT

## 2021-08-07 PROCEDURE — 93005 ELECTROCARDIOGRAM TRACING: CPT | Performed by: EMERGENCY MEDICINE

## 2021-08-07 PROCEDURE — A9270 NON-COVERED ITEM OR SERVICE: HCPCS | Performed by: EMERGENCY MEDICINE

## 2021-08-07 PROCEDURE — 700111 HCHG RX REV CODE 636 W/ 250 OVERRIDE (IP): Performed by: EMERGENCY MEDICINE

## 2021-08-07 PROCEDURE — 80053 COMPREHEN METABOLIC PANEL: CPT

## 2021-08-07 PROCEDURE — 700102 HCHG RX REV CODE 250 W/ 637 OVERRIDE(OP): Performed by: EMERGENCY MEDICINE

## 2021-08-07 PROCEDURE — 85025 COMPLETE CBC W/AUTO DIFF WBC: CPT

## 2021-08-07 PROCEDURE — 96374 THER/PROPH/DIAG INJ IV PUSH: CPT

## 2021-08-07 PROCEDURE — 70450 CT HEAD/BRAIN W/O DYE: CPT

## 2021-08-07 RX ORDER — PROMETHAZINE HYDROCHLORIDE 25 MG/1
12.5 TABLET ORAL EVERY 8 HOURS PRN
Qty: 20 TABLET | Refills: 0 | Status: SHIPPED | OUTPATIENT
Start: 2021-08-07 | End: 2021-09-20

## 2021-08-07 RX ORDER — METOCLOPRAMIDE HYDROCHLORIDE 5 MG/ML
10 INJECTION INTRAMUSCULAR; INTRAVENOUS ONCE
Status: COMPLETED | OUTPATIENT
Start: 2021-08-07 | End: 2021-08-07

## 2021-08-07 RX ORDER — DIPHENHYDRAMINE HYDROCHLORIDE 50 MG/ML
25 INJECTION INTRAMUSCULAR; INTRAVENOUS ONCE
Status: COMPLETED | OUTPATIENT
Start: 2021-08-07 | End: 2021-08-07

## 2021-08-07 RX ORDER — ACETAMINOPHEN 325 MG/1
650 TABLET ORAL ONCE
Status: COMPLETED | OUTPATIENT
Start: 2021-08-07 | End: 2021-08-07

## 2021-08-07 RX ADMIN — METOCLOPRAMIDE 10 MG: 5 INJECTION, SOLUTION INTRAMUSCULAR; INTRAVENOUS at 04:00

## 2021-08-07 RX ADMIN — DIPHENHYDRAMINE HYDROCHLORIDE 25 MG: 50 INJECTION INTRAMUSCULAR; INTRAVENOUS at 04:01

## 2021-08-07 RX ADMIN — ACETAMINOPHEN 650 MG: 325 TABLET, FILM COATED ORAL at 03:52

## 2021-08-07 ASSESSMENT — PAIN DESCRIPTION - PAIN TYPE
TYPE: ACUTE PAIN
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
## 2021-08-07 NOTE — ED PROVIDER NOTES
ED Provider Note     8/7/2021  3:17 AM    Means of Arrival: Walk In  History obtained by: patient  Limitations: None  PCP: none  CODE STATUS: Full    CHIEF COMPLAINT  Chief Complaint   Patient presents with   • Headache     x 3wks   • Dizziness     x 3wks   • Fatigue       HPI  aMry Gold is a 40 y.o. female history of Chiari malformation treated in 2014 by surgery from Dr. Curtis who presents with 2 weeks of increasing frequency of headaches and dizziness. Heads have been almost daily. They start at posterior head and radiate to temples. She also has periods where she feels dizzy and off balance. Currently not having dizziness. Headache is pounding at temples right now, 7/10 pain. No improvement with OTC pain medication. No vomiting. Some nausea. No vision changes. She had an incident 1 week ago when she was on a treadmill walking when she suddenly felt off balance, had palpitations, had to stop walking for symptoms to stop. She is 5 months post partum. She is not currently breast-feeding.    REVIEW OF SYSTEMS  Review of Systems   All other systems reviewed and are negative.    See HPI for further details.    PAST MEDICAL HISTORY   has a past medical history of Chiari malformation, Dizziness, Fatigue, Heart burn, Indigestion, Numbness and tingling in hands, Other acute pain, and Unspecified hemorrhagic conditions.    SOCIAL HISTORY  Social History     Tobacco Use   • Smoking status: Never Smoker   • Smokeless tobacco: Never Used   Vaping Use   • Vaping Use: Unknown   Substance and Sexual Activity   • Alcohol use: Not Currently   • Drug use: No   • Sexual activity: Not on file       SURGICAL HISTORY   has a past surgical history that includes other; craniectomy (8/17/2015); cervical laminectomy posterior (8/17/2015); and gyn surgery.    CURRENT MEDICATIONS  Home Medications     Reviewed by Glendy Turner R.N. (Registered Nurse) on 08/06/21 at 2803  Med List Status: Not Addressed   Medication Last Dose  "Status   acetaminophen (TYLENOL) 325 MG Tab  Active   Prenatal Vit-Fe Fumarate-FA (PRENATAL PO)  Active                ALLERGIES  No Known Allergies    PHYSICAL EXAM  VITAL SIGNS: /57   Pulse 65   Temp 36 °C (96.8 °F) (Temporal)   Resp 16   Ht 1.575 m (5' 2\")   Wt 92.6 kg (204 lb 2.3 oz)   SpO2 95%   BMI 37.34 kg/m²     Pulse ox interpretation: I interpret this pulse ox as normal.  Constitutional: Alert in no apparent distress.  HENT: No signs of trauma, Bilateral external ears normal, Nose normal.   Eyes: Pupils are equal, Conjunctiva normal, Non-icteric. Normal eye movements. No nystagmus.   Neck: Normal range of motion, No tenderness, Supple, No stridor.   Cardiovascular: Regular rate and rhythm, no murmurs. Symmetric distal pulses. No cyanosis of extremities. No peripheral edema of extremities.  Thorax & Lungs: Normal breath sounds, No respiratory distress, No wheezing, No chest tenderness.   Abdomen: Soft, No tenderness, No masses, No pulsatile masses. No peritoneal signs.  Skin: Warm, Dry, No erythema, No rash.   Back: No midline bony tenderness, No CVA tenderness.   Musculoskeletal: Good range of motion in all major joints. No tenderness to palpation or major deformities noted.   Neurologic: NIHSS = 0. Alert and oriented to person, place, situation. Clear speech. No facial droop. No nystagmus or irregular eye movements. No aphasia. No ataxia. No drift in extremities.    Psychiatric: Affect normal, Judgment normal, Mood normal.   Physical Exam      DIAGNOSTIC STUDIES / PROCEDURES    Results for orders placed or performed during the hospital encounter of 21   EKG   Result Value Ref Range    Report       [facility] Emergency Dept.    Test Date:  2021  Pt Name:    FAUSTINO OROPEZA               Department: ER  MRN:        [id number]                      Room:       [facility]  Gender:     Female                       Technician: [id number]  :        1980                   " Requested By:AUSTEN PERALTA II  Order #:    [id number]                    Reading MD: Austen Peralta II, MD    Measurements  Intervals                                Axis  Rate:       67                           P:          39  IA:         140                          QRS:        39  QRSD:       88                           T:          30  QT:         444  QTc:        469    Interpretive Statements  SINUS RHYTHM  Normal rate  Normal intervals  No ST elevation or depression. No twave changes.  Impression: Normal sinus rhythm EKG.  Compared to ECG 09/08/2015 19:21:00  T-wave abnormality no longer present  Electronically Signed On 8-7-2021 5:04:28 PDT by Austen Peralta II, MD         LABS  Pertinent Labs & Imaging studies reviewed. (See chart for details)    RADIOLOGY  Pertinent Labs & Imaging studies reviewed. (See chart for details)    COURSE & MEDICAL DECISION MAKING  Pertinent Labs & Imaging studies reviewed. (See chart for details)    3:17 AM This is an emergent evaluation of a  40 y.o. female with history of surgery for Chiari malformation now presenting with 2 weeks of intermittent headaches and episodes of dizziness/vertigo-like symptoms. She is not having any dizziness symptoms at this time. Will check EKG for any suggestion of arrhythmia.    5:24 AM  Headache resolved. CT head with post surgical changes but no acute findings. Unclear cause of headaches but unlikely to be from intracranial hemorrhage, infection. CT did not suggest any tumors causing mass-effect. She will be prescribed Phenergan to take at home if she has recurrent headaches. I encouraged her to take this with either Motrin or Tylenol. EKG normal. No findings concerning for Brugada or WPW. Normal CBC. CMP shows slight elevation in LFTs. This could be fatty liver disease. Less likely obstructive disease and she has no pain in the abdomen. Also less likely Tylenol toxicity since she has only been taking 500 mg 1-2 times daily. She is  aware of the abnormalities and I have asked her to address this with primary provider. Hopefully she can follow-up within the next 2 weeks and have levels rechecked. She was discharged in good stable condition.     The patient will return for worsening symptoms and is stable at the time of discharge. The patient verbalizes understanding. Guidance was provided on appropriate use of medications including driving under the influence, overdose, and side effects.         FINAL IMPRESSION    ICD-10-CM   1. Dizziness  R42   2. Nonintractable episodic headache, unspecified headache type  R51.9   3. Elevated liver enzymes  R74.8       This dictation was created using voice recognition software. The accuracy of the dictation is limited to the abilities of the software. I expect there may be some errors of grammar and possibly content. The nursing notes were reviewed and certain aspects of this information were incorporated into this note.    Electronically signed by: Austen Mcintosh II, M.D., 8/7/2021 3:17 AM

## 2021-08-09 ENCOUNTER — PATIENT OUTREACH (OUTPATIENT)
Dept: HEALTH INFORMATION MANAGEMENT | Facility: OTHER | Age: 41
End: 2021-08-09

## 2021-08-09 NOTE — PROGRESS NOTES
08/09/21  CHW received incoming order from [facility]. CHW called patient to follow up after ED visit. Patient needs a PCP. Phone was answered by someone who reported living in the same household. CHW did not leave any contact information. CHW will attempt outreach at a later time.    08/17/21  CHW Chloe called patient to follow up. Family household member answered and wrote down contact info for CCM.     08/19/21  DAVEY Corona called patient to follow up. Family household member answered and wrote down contact info for CCM. CHW will no longer follow as unable to contact.

## 2021-08-19 ENCOUNTER — PATIENT OUTREACH (OUTPATIENT)
Dept: HEALTH INFORMATION MANAGEMENT | Facility: OTHER | Age: 41
End: 2021-08-19

## 2021-08-19 NOTE — PROGRESS NOTES
08/19/21  Patient called CHW back and left a message. CHW called patient back at [phone] and was unable to leave a vm as pt has no vm set up. CHW will not follow as patient is unable to be contacted.

## 2021-09-10 ENCOUNTER — OFFICE VISIT (OUTPATIENT)
Dept: URGENT CARE | Facility: CLINIC | Age: 41
End: 2021-09-10
Payer: MEDICAID

## 2021-09-10 ENCOUNTER — HOSPITAL ENCOUNTER (OUTPATIENT)
Facility: MEDICAL CENTER | Age: 41
End: 2021-09-10
Attending: NURSE PRACTITIONER
Payer: MEDICAID

## 2021-09-10 ENCOUNTER — NON-PROVIDER VISIT (OUTPATIENT)
Dept: URGENT CARE | Facility: CLINIC | Age: 41
End: 2021-09-10
Payer: MEDICAID

## 2021-09-10 VITALS
HEIGHT: 62 IN | HEART RATE: 77 BPM | TEMPERATURE: 98.4 F | OXYGEN SATURATION: 95 % | WEIGHT: 207 LBS | BODY MASS INDEX: 38.09 KG/M2 | RESPIRATION RATE: 16 BRPM | SYSTOLIC BLOOD PRESSURE: 110 MMHG | DIASTOLIC BLOOD PRESSURE: 70 MMHG

## 2021-09-10 DIAGNOSIS — B34.9 VIRAL ILLNESS: ICD-10-CM

## 2021-09-10 DIAGNOSIS — Z20.822 EXPOSURE TO COVID-19 VIRUS: ICD-10-CM

## 2021-09-10 DIAGNOSIS — Z02.1 PRE-EMPLOYMENT DRUG SCREENING: ICD-10-CM

## 2021-09-10 LAB
AMP AMPHETAMINE: NORMAL
BAR BARBITURATES: NORMAL
BZO BENZODIAZEPINES: NORMAL
COC COCAINE: NORMAL
INT CON NEG: NORMAL
INT CON POS: NORMAL
MDMA ECSTASY: NORMAL
MET METHAMPHETAMINES: NORMAL
MTD METHADONE: NORMAL
OPI OPIATES: NORMAL
OXY OXYCODONE: NORMAL
PCP PHENCYCLIDINE: NORMAL
POC URINE DRUG SCREEN OCDRS: NEGATIVE
THC: NORMAL

## 2021-09-10 PROCEDURE — 99213 OFFICE O/P EST LOW 20 MIN: CPT | Mod: CS | Performed by: NURSE PRACTITIONER

## 2021-09-10 PROCEDURE — 80305 DRUG TEST PRSMV DIR OPT OBS: CPT | Performed by: NURSE PRACTITIONER

## 2021-09-10 PROCEDURE — U0005 INFEC AGEN DETEC AMPLI PROBE: HCPCS

## 2021-09-10 PROCEDURE — U0003 INFECTIOUS AGENT DETECTION BY NUCLEIC ACID (DNA OR RNA); SEVERE ACUTE RESPIRATORY SYNDROME CORONAVIRUS 2 (SARS-COV-2) (CORONAVIRUS DISEASE [COVID-19]), AMPLIFIED PROBE TECHNIQUE, MAKING USE OF HIGH THROUGHPUT TECHNOLOGIES AS DESCRIBED BY CMS-2020-01-R: HCPCS

## 2021-09-10 RX ORDER — IBUPROFEN 800 MG/1
TABLET ORAL
COMMUNITY
Start: 2021-07-06 | End: 2021-09-20

## 2021-09-10 RX ORDER — ONDANSETRON 4 MG/1
TABLET, FILM COATED ORAL
COMMUNITY
Start: 2021-07-06 | End: 2021-09-20

## 2021-09-10 ASSESSMENT — FIBROSIS 4 INDEX: FIB4 SCORE: 1.51

## 2021-09-11 DIAGNOSIS — B34.9 VIRAL ILLNESS: ICD-10-CM

## 2021-09-11 DIAGNOSIS — Z20.822 EXPOSURE TO COVID-19 VIRUS: ICD-10-CM

## 2021-09-11 LAB — COVID ORDER STATUS COVID19: NORMAL

## 2021-09-11 NOTE — PROGRESS NOTES
Chief Complaint   Patient presents with   • Other     covid test       HISTORY OF PRESENT ILLNESS: Patient is a pleasant 40 y.o. female who presents today due to symptoms which started 10 days ago. Pt reports a decreased taste, rhinitis, sore throat, nausea, fatigue, malaise, and body aches. Denies chest pain, shortness of breath, or wheezing. Denies h/o asthma/copd/CAP. No immunocompromise. Has tried OTC cold medications without significant relief of symptoms. No recent ABX use. No other aggravating or alleviating factors. Reports positive exposure to COVID-19, has been vaccinated.       Patient Active Problem List    Diagnosis Date Noted   • Chiari malformation 08/17/2015       Allergies:Patient has no known allergies.    Current Outpatient Medications Ordered in Epic   Medication Sig Dispense Refill   • ibuprofen (MOTRIN) 800 MG Tab TAKE 1 TABLET BY MOUTH THREE TIMES DAILY WITH FOOD OR MILK AS NEEDED FOR 30 DAYS     • ondansetron (ZOFRAN) 4 MG Tab tablet TAKE 1 TABLET BY MOUTH EVERY 6 HOURS FOR 7 DAYS     • promethazine (PHENERGAN) 25 MG Tab Take 0.5 Tablets by mouth every 8 hours as needed for Nausea/Vomiting (headache). 20 tablet 0   • Prenatal Vit-Fe Fumarate-FA (PRENATAL PO) Take 1 Tab by mouth every day.     • acetaminophen (TYLENOL) 325 MG Tab Take 325 mg by mouth every 6 hours as needed.       No current Epic-ordered facility-administered medications on file.       Past Medical History:   Diagnosis Date   • Chiari malformation    • Dizziness    • Fatigue    • Heart burn    • Indigestion    • Numbness and tingling in hands    • Other acute pain     headaches   • Unspecified hemorrhagic conditions        Social History     Tobacco Use   • Smoking status: Never Smoker   • Smokeless tobacco: Never Used   Vaping Use   • Vaping Use: Unknown   Substance Use Topics   • Alcohol use: Not Currently   • Drug use: No       No family status information on file.   History reviewed. No pertinent family  "history.    ROS:  Review of Systems   Constitutional: Positive for body aches, fatigue, malaise. Negative for weight loss, fever.  HENT: Positive for rhinitis, sore throat, decreased taste. Negative for ear pain, nosebleeds, and neck pain.    Eyes: Negative for vision changes.   Cardiovascular: Negative for chest pain, palpitations, orthopnea and leg swelling.   Respiratory: Negative for cough, shortness of breath and wheezing.   Gastrointestinal: Negative for abdominal pain, vomiting or diarrhea.   Skin: Negative for rash, diaphoresis.     Exam:  /70   Pulse 77   Temp 36.9 °C (98.4 °F)   Resp 16   Ht 1.575 m (5' 2\")   Wt 93.9 kg (207 lb)   SpO2 95%   General: well-nourished, well-developed female in NAD  Head: normocephalic, atraumatic  Eyes: PERRLA, EOM within normal limits, no conjunctival injection, no scleral icterus, visual fields and acuity grossly intact.  Ears: normal shape and symmetry, no tenderness, no discharge. External canals are without any significant edema or erythema. Tympanic membranes are without any inflammation, no effusion. Gross auditory acuity is intact.  Nose: symmetrical without tenderness, mild discharge, erythema present bilateral nares.  Mouth/Throat: reasonable hygiene, no exudates or tonsillar enlargement. Mild erythema present.   Neck: no masses, range of motion within normal limits, no tracheal deviation.  Lymph: mild cervical adenopathy. No supraclavicular adenopathy.   Neuro: alert and oriented. Cranial nerves 1-12 grossly intact.   Cardiovascular: regular rate and rhythm without murmurs, rubs, or gallops. No edema.   Pulmonary: no distress. Chest is symmetrical with respiration. No wheezes, crackles, or rhonchi.   Musculoskeletal: appropriate muscle tone, gait is stable.  Skin: warm, dry, intact, no clubbing, no cyanosis.   Psych: appropriate mood, affect, judgement.         Assessment/Plan:  1. Exposure to COVID-19 virus  COVID/SARS CoV-2 PCR   2. Viral illness  " COVID/SARS CoV-2 PCR             Discussed symptoms most likely viral, will test for COVID. Home quarantine advised. Discussed natural progression and sx care.  Rest, increase fluids, hand and respiratory hygiene. May take OTC medications as directed for symptom relief. STRICT ER precautions.   Supportive care, differential diagnoses, and indications for immediate follow-up discussed with patient.   Pathogenesis of diagnosis discussed including typical length and natural progression.  Instructed to return to clinic or nearest emergency department for any change in condition, further concerns, or worsening of symptoms.  Patient states understanding of the plan of care and discharge instructions.          RUDI Alas.

## 2021-09-12 LAB
SARS-COV-2 RNA RESP QL NAA+PROBE: NOTDETECTED
SPECIMEN SOURCE: NORMAL

## 2021-09-19 ENCOUNTER — OFFICE VISIT (OUTPATIENT)
Dept: URGENT CARE | Facility: CLINIC | Age: 41
End: 2021-09-19
Payer: MEDICAID

## 2021-09-19 VITALS
TEMPERATURE: 97.1 F | BODY MASS INDEX: 38.35 KG/M2 | HEART RATE: 93 BPM | SYSTOLIC BLOOD PRESSURE: 120 MMHG | OXYGEN SATURATION: 95 % | DIASTOLIC BLOOD PRESSURE: 70 MMHG | WEIGHT: 208.4 LBS | HEIGHT: 62 IN | RESPIRATION RATE: 16 BRPM

## 2021-09-19 DIAGNOSIS — R19.7 DIARRHEA, UNSPECIFIED TYPE: ICD-10-CM

## 2021-09-19 PROCEDURE — 99213 OFFICE O/P EST LOW 20 MIN: CPT | Performed by: PHYSICIAN ASSISTANT

## 2021-09-19 ASSESSMENT — ENCOUNTER SYMPTOMS
FEVER: 0
VOMITING: 0
DIARRHEA: 1
ABDOMINAL PAIN: 0
HEADACHES: 0
MYALGIAS: 0
CHILLS: 0
BLOATING: 0
SWEATS: 0

## 2021-09-19 ASSESSMENT — FIBROSIS 4 INDEX: FIB4 SCORE: 1.51

## 2021-09-19 NOTE — LETTER
September 19, 2021         Patient: Mary Gold   YOB: 1980   Date of Visit: 9/19/2021           To Whom it May Concern:    Mary Gold was seen in my clinic on 9/19/2021. She may return to work on Monday Sept. 20th.    If you have any questions or concerns, please don't hesitate to call.        Sincerely,           Allen Rice P.A.-C.  Electronically Signed

## 2021-09-20 ENCOUNTER — OFFICE VISIT (OUTPATIENT)
Dept: URGENT CARE | Facility: CLINIC | Age: 41
End: 2021-09-20
Payer: MEDICAID

## 2021-09-20 ENCOUNTER — HOSPITAL ENCOUNTER (OUTPATIENT)
Facility: MEDICAL CENTER | Age: 41
End: 2021-09-20
Attending: FAMILY MEDICINE
Payer: MEDICAID

## 2021-09-20 VITALS
HEART RATE: 81 BPM | SYSTOLIC BLOOD PRESSURE: 120 MMHG | DIASTOLIC BLOOD PRESSURE: 84 MMHG | HEIGHT: 62 IN | RESPIRATION RATE: 16 BRPM | TEMPERATURE: 97.8 F | WEIGHT: 204.8 LBS | OXYGEN SATURATION: 98 % | BODY MASS INDEX: 37.69 KG/M2

## 2021-09-20 DIAGNOSIS — Z20.822 SUSPECTED COVID-19 VIRUS INFECTION: ICD-10-CM

## 2021-09-20 DIAGNOSIS — R19.5 LOOSE STOOLS: ICD-10-CM

## 2021-09-20 DIAGNOSIS — M79.10 MYALGIA: ICD-10-CM

## 2021-09-20 PROCEDURE — U0005 INFEC AGEN DETEC AMPLI PROBE: HCPCS

## 2021-09-20 PROCEDURE — 99214 OFFICE O/P EST MOD 30 MIN: CPT | Mod: CS | Performed by: FAMILY MEDICINE

## 2021-09-20 PROCEDURE — U0003 INFECTIOUS AGENT DETECTION BY NUCLEIC ACID (DNA OR RNA); SEVERE ACUTE RESPIRATORY SYNDROME CORONAVIRUS 2 (SARS-COV-2) (CORONAVIRUS DISEASE [COVID-19]), AMPLIFIED PROBE TECHNIQUE, MAKING USE OF HIGH THROUGHPUT TECHNOLOGIES AS DESCRIBED BY CMS-2020-01-R: HCPCS

## 2021-09-20 RX ORDER — LOPERAMIDE HYDROCHLORIDE 2 MG/1
2 CAPSULE ORAL 4 TIMES DAILY PRN
Qty: 20 CAPSULE | Refills: 0 | Status: SHIPPED | OUTPATIENT
Start: 2021-09-20 | End: 2022-02-08

## 2021-09-20 ASSESSMENT — ENCOUNTER SYMPTOMS
FEVER: 0
SORE THROAT: 0
NAUSEA: 0
CHILLS: 0
DIZZINESS: 0
DIARRHEA: 1
VOMITING: 0
HEADACHES: 1
SHORTNESS OF BREATH: 0
COUGH: 0
MYALGIAS: 1

## 2021-09-20 ASSESSMENT — FIBROSIS 4 INDEX: FIB4 SCORE: 1.51

## 2021-09-20 NOTE — PROGRESS NOTES
Subjective     Mary Gold is a 40 y.o. female who presents with Diarrhea (abd pain, fatigue x 1 day )            Diarrhea and upset stomach since yesterday.  No cough, vomiting, fevers.  Declines Covid testing or exposure.  Needs note for work.  Tolerating fluids and solids    Diarrhea   This is a new problem. The current episode started yesterday. The problem occurs 5 to 10 times per day. The problem has been unchanged. The stool consistency is described as watery. Pertinent negatives include no abdominal pain, bloating, chills, fever, headaches, myalgias, sweats, URI or vomiting. Nothing aggravates the symptoms. Risk factors include suspect food intake. She has tried increased fluids for the symptoms. The treatment provided mild relief.       PMH:  has a past medical history of Chiari malformation, Dizziness, Fatigue, Heart burn, Indigestion, Numbness and tingling in hands, Other acute pain, and Unspecified hemorrhagic conditions. She also has no past medical history of COPD or Psychiatric disorder.  MEDS:   Current Outpatient Medications:   •  ibuprofen (MOTRIN) 800 MG Tab, TAKE 1 TABLET BY MOUTH THREE TIMES DAILY WITH FOOD OR MILK AS NEEDED FOR 30 DAYS (Patient not taking: Reported on 9/19/2021), Disp: , Rfl:   •  ondansetron (ZOFRAN) 4 MG Tab tablet, TAKE 1 TABLET BY MOUTH EVERY 6 HOURS FOR 7 DAYS (Patient not taking: Reported on 9/19/2021), Disp: , Rfl:   •  promethazine (PHENERGAN) 25 MG Tab, Take 0.5 Tablets by mouth every 8 hours as needed for Nausea/Vomiting (headache). (Patient not taking: Reported on 9/19/2021), Disp: 20 tablet, Rfl: 0  •  Prenatal Vit-Fe Fumarate-FA (PRENATAL PO), Take 1 Tab by mouth every day. (Patient not taking: Reported on 9/19/2021), Disp: , Rfl:   •  acetaminophen (TYLENOL) 325 MG Tab, Take 325 mg by mouth every 6 hours as needed. (Patient not taking: Reported on 9/19/2021), Disp: , Rfl:   ALLERGIES: No Known Allergies  SURGHX:   Past Surgical History:   Procedure Laterality  "Date   • CRANIECTOMY  8/17/2015    Procedure: SUBOCCIPITAL CRANIECTOMY ;  Surgeon: Tenzni Curtis M.D.;  Location: SURGERY [facility];  Service:    • CERVICAL LAMINECTOMY POSTERIOR  8/17/2015    Procedure: CERVICAL LAMINECTOMY POSTERIOR C1, DUROPLASTY;  Surgeon: Tenzin Curtis M.D.;  Location: SURGERY [facility];  Service:    • GYN SURGERY     • OTHER      eye     SOCHX:  reports that she has never smoked. She has never used smokeless tobacco. She reports previous alcohol use. She reports that she does not use drugs.  FH: family history is not on file.      Review of Systems   Constitutional: Negative for chills and fever.   Gastrointestinal: Positive for diarrhea. Negative for abdominal pain, bloating and vomiting.   Musculoskeletal: Negative for myalgias.   Neurological: Negative for headaches.     Medications, Allergies, and current problem list reviewed today in Epic             Objective     /70   Pulse 93   Temp 36.2 °C (97.1 °F) (Temporal)   Resp 16   Ht 1.575 m (5' 2\")   Wt 94.5 kg (208 lb 6.4 oz)   SpO2 95%   BMI 38.12 kg/m²      Physical Exam  Vitals and nursing note reviewed.   Constitutional:       General: She is not in acute distress.     Appearance: She is well-developed. She is not diaphoretic.   HENT:      Head: Normocephalic and atraumatic.   Eyes:      Conjunctiva/sclera: Conjunctivae normal.   Cardiovascular:      Rate and Rhythm: Normal rate and regular rhythm.      Heart sounds: Normal heart sounds.   Pulmonary:      Effort: Pulmonary effort is normal. No respiratory distress.      Breath sounds: Normal breath sounds. No wheezing, rhonchi or rales.   Abdominal:      General: Abdomen is flat. Bowel sounds are normal. There is no distension.      Palpations: Abdomen is soft.      Tenderness: There is abdominal tenderness (Generalized, mild). There is no right CVA tenderness, left CVA tenderness, guarding or rebound. Negative signs include Mahtew's sign and McBurney's " sign.   Musculoskeletal:      Cervical back: Normal range of motion and neck supple.   Skin:     General: Skin is warm and dry.   Neurological:      Mental Status: She is alert and oriented to person, place, and time.   Psychiatric:         Behavior: Behavior normal.         Thought Content: Thought content normal.         Judgment: Judgment normal.                             Assessment & Plan         1. Diarrhea, unspecified type       Watery diarrhea with generalized abdominal pain.  No fever, vomiting, urinary symptoms.  Symptoms started after eating [employer].  No pertinent past abdominal surgical history.  No Covid exposure.  Patient declines Covid testing today.  Vital signs normal.  Exam shows mild generalized abdominal tenderness without localization.  Abdomen flat, soft, nondistended with normal bowel sounds.  No McBurney's point tenderness.  Mathew sign negative.  Advised patient cannot exclude abdominal etiology in the urgent care.  If symptoms worsen she will go to the ER.  OTC meds and conservative measures as discussed    Return to clinic or go to ED if symptoms worsen or persist. Indications for ED discussed at length. Patient/Parent/Guardian voices understanding. Follow-up with your primary care provider in 3-5 days. Red flag symptoms discussed. All side effects of medication discussed including allergic response, GI upset, tendon injury, rash, sedation etc.    Please note that this dictation was created using voice recognition software. I have made every reasonable attempt to correct obvious errors, but I expect that there are errors of grammar and possibly content that I did not discover before finalizing the note.

## 2021-09-21 DIAGNOSIS — Z20.822 SUSPECTED COVID-19 VIRUS INFECTION: ICD-10-CM

## 2021-09-21 LAB — COVID ORDER STATUS COVID19: NORMAL

## 2021-09-21 NOTE — PATIENT INSTRUCTIONS
INSTRUCTIONS FOR COVID-19 OR ANY OTHER INFECTIOUS RESPIRATORY ILLNESSES    The Centers for Disease Control and Prevention (CDC) states that early indications for COVID-19 include cough, shortness of breath, difficulty breathing, or at least two of the following symptoms: chills, shaking with chills, muscle pain, headache, sore throat, and loss of taste or smell. Symptoms can range from mild to severe and may appear up to two weeks after exposure to the virus.    The practice of self-isolation and quarantine helps protect the public and your family by  preventing exposure to people who have or may have a contagious disease. Please follow the prevention steps below as based on CDC guidelines:    WHEN TO STOP ISOLATION: Persons with COVID-19 or any other infectious respiratory illness who have symptoms and were advised to care for themselves at home may discontinue home isolation under the following conditions:  · At least 24 hours have passed since recovery defined as resolution of fever without the use of fever-reducing medications; AND,  · Improvement in respiratory symptoms (e.g., cough, shortness of breath); AND,  · At least 10 days have passed since symptoms first appeared and have had no subsequent illness.    MONITOR YOUR SYMPTOMS: If your illness is worsening, seek prompt medical attention. If you have a medical emergency and need to call 911, notify the dispatch personnel that you have, or are being evaluated for confirmed or suspected COVID-19 or another infectious respiratory illness. Wear a facemask if possible.    ACTIVITY RESTRICTION: restrict activities outside your home, except for getting medical care. Do not go to work, school, or public areas. Avoid using public transportation, ride-sharing, or taxis.    SCHEDULED MEDICAL APPOINTMENTS: Notify your provider that you have, or are being evaluated for, confirmed or suspected COVID-19 or another infectious respiratory. This will help the healthcare  provider’s office safely take care of you and keep other people from getting exposed or infected.    FACEMASKS, when to wear: Anytime you are away from your home or around other people or pets. If you are unable to wear one, maintain a minimum of 6 feet distancing from others.    LIVING ENVIRONMENT: Stay in a separate room from other people and pets. If possible, use a separate bathroom, have someone else care for your pets and avoid sharing household items. Any items used should be washed thoroughly with soap and water. Clean all “high-touch” surfaces every day. Use a household cleaning spray or wipe, according to the label instructions. High touch surfaces include (but are not limited to) counters, tabletops, doorknobs, bathroom fixtures, toilets, phones, keyboards, tablets, and bedside tables.     HAND WASHING: Frequently wash hands with soap and water for at least 20 seconds,  especially after blowing your nose, coughing, or sneezing; going to the bathroom; before and after interacting with pets; and before and after eating or preparing food. If hands are visibly dirty use soap and water. If soap and water are not available, use an alcohol-based hand  with at least 60% alcohol. Avoid touching your eyes, nose, and mouth with unwashed hands. Cover your coughs and sneezes with a tissue. Throw used tissues in a lined trash can. Immediately wash your hands.    ACTIVE/FACILITATED SELF-MONITORING: Follow instructions provided by your local health department or health professionals, as appropriate. When working with your local health department check their available hours.    [facility]   Phone Number   [facility] [phone]   [facility] [place] [phone]   [place] Call [address]   [place] [phone]     IF YOU HAVE CONFIRMED POSITIVE COVID-19:    Those who have completely recovered from COVID-19 may have immune-boosting antibodies in their plasma--called “convalescent plasma”--that could be  used to treat critically ill COVID19 patients.    [facility] is excited to begin working with [place] on collecting convalescent plasma from  people who have recovered from COVID-19 as part of a program to treat patients infected with the virus. This FDA-approved “emergency investigational new drug” is a special blood product containing antibodies that may give patients an extra boost to fight the virus.    To be eligible to donate convalescent plasma, you must have a prior COVID-19 diagnosis documented by a laboratory test (or a positive test result for SARS-CoV-2 antibodies) and meet additional eligibility requirements.    If you are interested in donating convalescent plasma or have any additional questions, please contact the [facility] Convalescent Plasma  at [phone] or via e-mail at [facility] [email or web address].    Diarrhea, Adult  Diarrhea is when you pass loose and watery poop (stool) often. Diarrhea can make you feel weak and cause you to lose water in your body (get dehydrated). Losing water in your body can cause you to:  · Feel tired and thirsty.  · Have a dry mouth.  · Go pee (urinate) less often.  Diarrhea often lasts 2-3 days. However, it can last longer if it is a sign of something more serious. It is important to treat your diarrhea as told by your doctor.  Follow these instructions at home:  Eating and drinking         Follow these instructions as told by your doctor:  · Take an ORS (oral rehydration solution). This is a drink that helps you replace fluids and minerals your body lost. It is sold at pharmacies and stores.  · Drink plenty of fluids, such as:  ? Water.  ? Ice chips.  ? Diluted fruit juice.  ? Low-calorie sports drinks.  ? Milk, if you want.  · Avoid drinking fluids that have a lot of sugar or caffeine in them.  · Eat bland, easy-to-digest foods in small amounts as you are able. These foods include:  ? Bananas.  ? Applesauce.  ? Rice.  ? Low-fat (lean)  meats.  ? Toast.  ? Crackers.  · Avoid alcohol.  · Avoid spicy or fatty foods.    Medicines  · Take over-the-counter and prescription medicines only as told by your doctor.  · If you were prescribed an antibiotic medicine, take it as told by your doctor. Do not stop using the antibiotic even if you start to feel better.  General instructions    · Wash your hands often using soap and water. If soap and water are not available, use a hand . Others in your home should wash their hands as well. Hands should be washed:  ? After using the toilet or changing a diaper.  ? Before preparing, cooking, or serving food.  ? While caring for a sick person.  ? While visiting someone in a hospital.  · Drink enough fluid to keep your pee (urine) pale yellow.  · Rest at home while you get better.  · Watch your condition for any changes.  · Take a warm bath to help with any burning or pain from having diarrhea.  · Keep all follow-up visits as told by your doctor. This is important.  Contact a doctor if:  · You have a fever.  · Your diarrhea gets worse.  · You have new symptoms.  · You cannot keep fluids down.  · You feel light-headed or dizzy.  · You have a headache.  · You have muscle cramps.  Get help right away if:  · You have chest pain.  · You feel very weak or you pass out (faint).  · You have bloody or black poop or poop that looks like tar.  · You have very bad pain, cramping, or bloating in your belly (abdomen).  · You have trouble breathing or you are breathing very quickly.  · Your heart is beating very quickly.  · Your skin feels cold and clammy.  · You feel confused.  · You have signs of losing too much water in your body, such as:  ? Dark pee, very little pee, or no pee.  ? Cracked lips.  ? Dry mouth.  ? Sunken eyes.  ? Sleepiness.  ? Weakness.  Summary  · Diarrhea is when you pass loose and watery poop (stool) often.  · Diarrhea can make you feel weak and cause you to lose water in your body (get  dehydrated).  · Take an ORS (oral rehydration solution). This is a drink that is sold at pharmacies and stores.  · Eat bland, easy-to-digest foods in small amounts as you are able.  · Contact a doctor if your condition gets worse. Get help right away if you have signs that you have lost too much water in your body.  This information is not intended to replace advice given to you by your health care provider. Make sure you discuss any questions you have with your health care provider.  Document Released: 06/05/2009 Document Revised: 05/24/2019 Document Reviewed: 05/24/2019  Elsevier Patient Education © 2020 [employer]

## 2021-09-21 NOTE — PROGRESS NOTES
Subjective:   Mary Gold is a 40 y.o. female who presents for Diarrhea (x3days, headache)        Diarrhea   This is a new problem. Episode onset: 3 days. The problem has been unchanged. Associated symptoms include headaches and myalgias. Pertinent negatives include no chills, coughing, fever or vomiting. Associated symptoms comments: There has been community-wide COVID-19 exposure, the patient denies known direct COVID-19 exposure    Complains of fatigue. Risk factors include suspect food intake. She has tried increased fluids for the symptoms. The treatment provided no relief.     PMH:  has a past medical history of Chiari malformation, Dizziness, Fatigue, Heart burn, Indigestion, Numbness and tingling in hands, Other acute pain, and Unspecified hemorrhagic conditions. She also has no past medical history of COPD or Psychiatric disorder.  MEDS:   Current Outpatient Medications:   •  loperamide (IMODIUM) 2 MG Cap, Take 1 Capsule by mouth 4 times a day as needed for Diarrhea., Disp: 20 Capsule, Rfl: 0  ALLERGIES: No Known Allergies  SURGHX:   Past Surgical History:   Procedure Laterality Date   • CRANIECTOMY  8/17/2015    Procedure: SUBOCCIPITAL CRANIECTOMY ;  Surgeon: Tenzin Curtis M.D.;  Location: SURGERY [facility];  Service:    • CERVICAL LAMINECTOMY POSTERIOR  8/17/2015    Procedure: CERVICAL LAMINECTOMY POSTERIOR C1, DUROPLASTY;  Surgeon: Tenzin Curtis M.D.;  Location: SURGERY [facility];  Service:    • GYN SURGERY     • OTHER      eye     SOCHX:  reports that she has never smoked. She has never used smokeless tobacco. She reports previous alcohol use. She reports that she does not use drugs.  FH: History reviewed. No pertinent family history.  Review of Systems   Constitutional: Negative for chills and fever.   HENT: Negative for sore throat.    Respiratory: Negative for cough and shortness of breath.    Gastrointestinal: Positive for diarrhea. Negative for nausea and vomiting.  "  Genitourinary: Negative for dysuria.   Musculoskeletal: Positive for myalgias.   Skin: Negative for rash.   Neurological: Positive for headaches. Negative for dizziness.        Objective:   /84   Pulse 81   Temp 36.6 °C (97.8 °F) (Temporal)   Resp 16   Ht 1.575 m (5' 2\")   Wt 92.9 kg (204 lb 12.8 oz)   SpO2 98%   BMI 37.46 kg/m²   Physical Exam  Vitals and nursing note reviewed.   Constitutional:       General: She is not in acute distress.     Appearance: She is well-developed.   HENT:      Head: Normocephalic and atraumatic.      Right Ear: External ear normal.      Left Ear: External ear normal.      Nose: Nose normal.      Mouth/Throat:      Mouth: Mucous membranes are moist.      Pharynx: No posterior oropharyngeal erythema.   Eyes:      Conjunctiva/sclera: Conjunctivae normal.   Cardiovascular:      Rate and Rhythm: Normal rate.   Pulmonary:      Effort: Pulmonary effort is normal. No respiratory distress.      Breath sounds: Normal breath sounds. No wheezing or rhonchi.   Abdominal:      General: Bowel sounds are increased. There is no distension.      Tenderness: There is no abdominal tenderness. There is no guarding.   Musculoskeletal:         General: Normal range of motion.   Skin:     General: Skin is warm and dry.   Neurological:      General: No focal deficit present.      Mental Status: She is alert and oriented to person, place, and time. Mental status is at baseline.      Gait: Gait (gait at baseline) normal.   Psychiatric:         Judgment: Judgment normal.           Assessment/Plan:   1. Suspected COVID-19 virus infection  - SARS-CoV-2 PCR (24 hour In-House): Collect NP swab in VTM; Future    2. Loose stools  - loperamide (IMODIUM) 2 MG Cap; Take 1 Capsule by mouth 4 times a day as needed for Diarrhea.  Dispense: 20 Capsule; Refill: 0    3. Myalgia    Advised routine CDC social distancing guidelines, symptomatic and supportive measures        Medical Decision Making/Course:  In the " course of preparing for this visit with review of the pertinent past medical history, recent and past clinic visits, current medications, and performing chart, immunization, medical history and medication reconciliation, and in the further course of obtaining the current history pertinent to the clinic visit today, performing an exam and evaluation, ordering and independently evaluating labs, tests, and/or procedures, prescribing any recommended new medications as noted above, providing any pertinent counseling and education and recommending further coordination of care, at least 20 minutes of total time were spent during this encounter.      Discussed close monitoring, return precautions, and supportive measures of maintaining adequate fluid hydration and caloric intake, relative rest and symptom management as needed for pain and/or fever.    Differential diagnosis, natural history, supportive care, and indications for immediate follow-up discussed.     Advised the patient to follow-up with the primary care physician for recheck, reevaluation, and consideration of further management.    Please note that this dictation was created using voice recognition software. I have worked with consultants from the vendor as well as technical experts from [facility] to optimize the interface. I have made every reasonable attempt to correct obvious errors, but I expect that there are errors of grammar and possibly content that I did not discover before finalizing the note.

## 2021-09-22 LAB
SARS-COV-2 RNA RESP QL NAA+PROBE: NOTDETECTED
SPECIMEN SOURCE: NORMAL

## 2021-09-25 ENCOUNTER — HOSPITAL ENCOUNTER (EMERGENCY)
Dept: HOSPITAL 8 - ED | Age: 41
LOS: 1 days | Discharge: HOME | End: 2021-09-26
Payer: MEDICAID

## 2021-09-25 VITALS — HEIGHT: 62 IN | WEIGHT: 202.83 LBS | BODY MASS INDEX: 37.32 KG/M2

## 2021-09-25 VITALS — SYSTOLIC BLOOD PRESSURE: 141 MMHG | DIASTOLIC BLOOD PRESSURE: 71 MMHG

## 2021-09-25 DIAGNOSIS — J02.0: Primary | ICD-10-CM

## 2021-09-25 DIAGNOSIS — M79.10: ICD-10-CM

## 2021-09-25 PROCEDURE — 87880 STREP A ASSAY W/OPTIC: CPT

## 2021-09-25 PROCEDURE — 99283 EMERGENCY DEPT VISIT LOW MDM: CPT

## 2021-11-09 ENCOUNTER — HOSPITAL ENCOUNTER (OUTPATIENT)
Facility: MEDICAL CENTER | Age: 41
End: 2021-11-09
Attending: PHYSICIAN ASSISTANT
Payer: MEDICAID

## 2021-11-09 ENCOUNTER — OFFICE VISIT (OUTPATIENT)
Dept: URGENT CARE | Facility: CLINIC | Age: 41
End: 2021-11-09
Payer: MEDICAID

## 2021-11-09 VITALS
TEMPERATURE: 97.2 F | HEART RATE: 74 BPM | WEIGHT: 212.2 LBS | OXYGEN SATURATION: 97 % | BODY MASS INDEX: 39.05 KG/M2 | DIASTOLIC BLOOD PRESSURE: 60 MMHG | RESPIRATION RATE: 16 BRPM | SYSTOLIC BLOOD PRESSURE: 110 MMHG | HEIGHT: 62 IN

## 2021-11-09 DIAGNOSIS — R05.9 COUGH: ICD-10-CM

## 2021-11-09 DIAGNOSIS — J02.9 SORE THROAT: ICD-10-CM

## 2021-11-09 DIAGNOSIS — J02.0 STREP THROAT: ICD-10-CM

## 2021-11-09 LAB
EXTERNAL QUALITY CONTROL: NORMAL
INT CON NEG: NEGATIVE
INT CON POS: POSITIVE
S PYO AG THROAT QL: POSITIVE
SARS-COV+SARS-COV-2 AG RESP QL IA.RAPID: NEGATIVE

## 2021-11-09 PROCEDURE — 87880 STREP A ASSAY W/OPTIC: CPT | Performed by: PHYSICIAN ASSISTANT

## 2021-11-09 PROCEDURE — 99214 OFFICE O/P EST MOD 30 MIN: CPT | Performed by: PHYSICIAN ASSISTANT

## 2021-11-09 PROCEDURE — U0005 INFEC AGEN DETEC AMPLI PROBE: HCPCS

## 2021-11-09 PROCEDURE — U0003 INFECTIOUS AGENT DETECTION BY NUCLEIC ACID (DNA OR RNA); SEVERE ACUTE RESPIRATORY SYNDROME CORONAVIRUS 2 (SARS-COV-2) (CORONAVIRUS DISEASE [COVID-19]), AMPLIFIED PROBE TECHNIQUE, MAKING USE OF HIGH THROUGHPUT TECHNOLOGIES AS DESCRIBED BY CMS-2020-01-R: HCPCS

## 2021-11-09 PROCEDURE — 87426 SARSCOV CORONAVIRUS AG IA: CPT | Performed by: PHYSICIAN ASSISTANT

## 2021-11-09 RX ORDER — AMOXICILLIN 500 MG/1
500 CAPSULE ORAL 2 TIMES DAILY
Qty: 20 CAPSULE | Refills: 0 | Status: SHIPPED | OUTPATIENT
Start: 2021-11-09 | End: 2021-11-19

## 2021-11-09 ASSESSMENT — ENCOUNTER SYMPTOMS
SORE THROAT: 1
COUGH: 0
FEVER: 0
CHILLS: 0

## 2021-11-09 ASSESSMENT — FIBROSIS 4 INDEX: FIB4 SCORE: 1.51

## 2021-11-10 DIAGNOSIS — R05.9 COUGH: ICD-10-CM

## 2021-11-10 LAB — COVID ORDER STATUS COVID19: NORMAL

## 2021-11-10 NOTE — PROGRESS NOTES
Subjective:   Mary Gold is a 40 y.o. female who presents today with   Chief Complaint   Patient presents with   • Pharyngitis     Sore throat, bodyaches,      Sister is present also who she lives with with similar symptoms.  Pharyngitis   This is a new problem. The problem has been unchanged. There has been no fever. The pain is mild. Associated symptoms include congestion. Pertinent negatives include no coughing. Associated symptoms comments: Body aches. She has tried nothing for the symptoms. The treatment provided no relief.   I personally donned proper PPE throughout visit today.       PMH:  has a past medical history of Chiari malformation, Dizziness, Fatigue, Heart burn, Indigestion, Numbness and tingling in hands, Other acute pain, and Unspecified hemorrhagic conditions. She also has no past medical history of COPD or Psychiatric disorder.  MEDS:   Current Outpatient Medications:   •  amoxicillin (AMOXIL) 500 MG Cap, Take 1 Capsule by mouth 2 times a day for 10 days., Disp: 20 Capsule, Rfl: 0  •  loperamide (IMODIUM) 2 MG Cap, Take 1 Capsule by mouth 4 times a day as needed for Diarrhea. (Patient not taking: Reported on 11/9/2021), Disp: 20 Capsule, Rfl: 0  ALLERGIES: No Known Allergies  SURGHX:   Past Surgical History:   Procedure Laterality Date   • CRANIECTOMY  8/17/2015    Procedure: SUBOCCIPITAL CRANIECTOMY ;  Surgeon: Tenzin Curtis M.D.;  Location: SURGERY [facility];  Service:    • CERVICAL LAMINECTOMY POSTERIOR  8/17/2015    Procedure: CERVICAL LAMINECTOMY POSTERIOR C1, DUROPLASTY;  Surgeon: Tenzin Curtis M.D.;  Location: SURGERY [facility];  Service:    • GYN SURGERY     • OTHER      eye     SOCHX:  reports that she has never smoked. She has never used smokeless tobacco. She reports previous alcohol use. She reports that she does not use drugs.  FH: Reviewed with patient, not pertinent to this visit.       Review of Systems   Constitutional: Negative for chills and fever.   HENT:  "Positive for congestion and sore throat.    Respiratory: Negative for cough.         Objective:   /60 (BP Location: Right arm, Patient Position: Sitting, BP Cuff Size: Adult)   Pulse 74   Temp 36.2 °C (97.2 °F) (Temporal)   Resp 16   Ht 1.575 m (5' 2\")   Wt 96.3 kg (212 lb 3.2 oz)   SpO2 97%   BMI 38.81 kg/m²   Physical Exam  Vitals and nursing note reviewed.   Constitutional:       General: She is not in acute distress.     Appearance: Normal appearance. She is well-developed. She is not ill-appearing or toxic-appearing.   HENT:      Head: Normocephalic and atraumatic.      Right Ear: Hearing normal.      Left Ear: Hearing normal.      Mouth/Throat:      Pharynx: Posterior oropharyngeal erythema present. No oropharyngeal exudate.   Eyes:      Conjunctiva/sclera: Conjunctivae normal.   Cardiovascular:      Rate and Rhythm: Normal rate and regular rhythm.      Heart sounds: Normal heart sounds.   Pulmonary:      Effort: Pulmonary effort is normal.      Breath sounds: Normal breath sounds. No stridor. No wheezing, rhonchi or rales.   Musculoskeletal:      Comments: Normal movement in all 4 extremities   Skin:     General: Skin is warm and dry.   Neurological:      Mental Status: She is alert.      Coordination: Coordination normal.   Psychiatric:         Mood and Affect: Mood normal.         STREP A +  COVID POCT NEG  Assessment/Plan:   Assessment    1. Cough  - POCT SARS-COV Antigen HERMANN (Symptomatic Only)  - SARS-CoV-2 PCR (24 hour In-House): Collect NP swab in VTM; Future    2. Sore throat  - POCT Rapid Strep A    3. Strep throat  - amoxicillin (AMOXIL) 500 MG Cap; Take 1 Capsule by mouth 2 times a day for 10 days.  Dispense: 20 Capsule; Refill: 0  Symptoms and presentation at this time are most consistent with strep and we will treat accordingly with antibiotics at this time.  Differential diagnosis, natural history, supportive care, and indications for immediate follow-up discussed.   Patient given " instructions and understanding of medications and treatment.    If not improving in 3-5 days, F/U with PCP or return to UC if symptoms worsen.    Patient agreeable to plan.  Greater than 30 minutes were spent reviewing patient's chart, examining and obtaining history from patient, and discussing plan of care.       Please note that this dictation was created using voice recognition software. I have made every reasonable attempt to correct obvious errors, but I expect that there are errors of grammar and possibly content that I did not discover before finalizing the note.    Inder Mcneil PA-C

## 2021-11-11 LAB
SARS-COV-2 RNA RESP QL NAA+PROBE: NOTDETECTED
SPECIMEN SOURCE: NORMAL

## 2021-11-13 ENCOUNTER — OFFICE VISIT (OUTPATIENT)
Dept: URGENT CARE | Facility: CLINIC | Age: 41
End: 2021-11-13
Payer: MEDICAID

## 2021-11-13 VITALS
BODY MASS INDEX: 38.09 KG/M2 | TEMPERATURE: 98.2 F | OXYGEN SATURATION: 95 % | RESPIRATION RATE: 14 BRPM | DIASTOLIC BLOOD PRESSURE: 84 MMHG | HEIGHT: 62 IN | SYSTOLIC BLOOD PRESSURE: 126 MMHG | WEIGHT: 207 LBS | HEART RATE: 88 BPM

## 2021-11-13 DIAGNOSIS — B34.9 ACUTE VIRAL SYNDROME: ICD-10-CM

## 2021-11-13 PROCEDURE — 99213 OFFICE O/P EST LOW 20 MIN: CPT | Performed by: STUDENT IN AN ORGANIZED HEALTH CARE EDUCATION/TRAINING PROGRAM

## 2021-11-13 ASSESSMENT — FIBROSIS 4 INDEX: FIB4 SCORE: 1.51

## 2021-11-13 NOTE — LETTER
November 13, 2021       Patient: Mary Gold   YOB: 1980   Date of Visit: 11/13/2021         To Whom It May Concern:    Mray Gold is excused from work on 11/14/2021 for medical reasons.  She is okay to return to work on 11/15/2021.    If you have any questions or concerns, please don't hesitate to call [phone]      Sincerely,      Lane David D.O.  Electronically Signed

## 2021-11-14 NOTE — PROGRESS NOTES
"Subjective:   CHIEF COMPLAINT  Chief Complaint   Patient presents with   • Sore Throat     Patient tested positive for strep 11/9/21, she says she is not feeling any better, developed cough       HPI  Mary Gold is a 40 y.o. female who presents with a chief complaint of body aches and fatigue.  Patient was seen in urgent care 4 days ago with similar symptoms, diagnosed with strep throat and started on amoxicillin which has not been helping.  She has not tried taking any Tylenol or ibuprofen.  Admits associated symptoms of a cough which developed 10 minutes ago.  Patient also reports experiencing shortness of breath but this is a chronic issue for her.  No nausea, vomiting or dysuria.    REVIEW OF SYSTEMS  General: no fever or chills  GI: no nausea or vomiting  See HPI for further details.    PAST MEDICAL HISTORY  Patient Active Problem List    Diagnosis Date Noted   • Chiari malformation 08/17/2015       SURGICAL HISTORY   has a past surgical history that includes other; craniectomy (8/17/2015); cervical laminectomy posterior (8/17/2015); and gyn surgery.    ALLERGIES  No Known Allergies    CURRENT MEDICATIONS  Home Medications     Reviewed by Joel Brown Ass't (Medical Assistant) on 11/13/21 at 2115  Med List Status: <None>   Medication Last Dose Status   amoxicillin (AMOXIL) 500 MG Cap Taking Active   loperamide (IMODIUM) 2 MG Cap  Active                SOCIAL HISTORY  Social History     Tobacco Use   • Smoking status: Never Smoker   • Smokeless tobacco: Never Used   Vaping Use   • Vaping Use: Unknown   Substance and Sexual Activity   • Alcohol use: Not Currently   • Drug use: No   • Sexual activity: Not Currently       FAMILY HISTORY  No family history on file.       Objective:   PHYSICAL EXAM  VITAL SIGNS: /84   Pulse 88   Temp 36.8 °C (98.2 °F)   Resp 14   Ht 1.575 m (5' 2\")   Wt 93.9 kg (207 lb)   SpO2 95%   BMI 37.86 kg/m²     Gen: no acute distress, normal voice  Skin: dry, intact, " moist mucosal membranes  ENT: No pharyngeal erythema or exudates.  Uvula midline.  No lymphadenopathy.  Lungs: CTAB w/ symmetric expansion  CV: RRR w/o murmurs or clicks  Psych: normal affect, normal judgement, alert, awake    Assessment/Plan:     1. Acute viral syndrome     Patient seen in urgent care for similar symptoms 4 days ago, diagnosed with strep throat and started on amoxicillin.  Unfortunately symptoms have not improved.  Informed the patient given she was on the appropriate antibiotic for strep throat and symptoms have not improved symptoms are likely viral in nature.  She tested negative for Covid.  She is vaccinated against Covid.  Encouraged symptomatic treatment with sinus rinses, Zyrtec, ibuprofen and Tylenol.  Follow-up as needed.    Differential diagnosis, natural history, supportive care, and indications for immediate follow-up discussed. All questions answered. Patient agrees with the plan of care.    Follow-up as needed if symptoms worsen or fail to improve to PCP, Urgent care or Emergency Room.    Please note that this dictation was created using voice recognition software. I have made a reasonable attempt to correct obvious errors, but I expect that there are errors of grammar and possibly content that I did not discover before finalizing the note.

## 2022-01-23 ENCOUNTER — HOSPITAL ENCOUNTER (OUTPATIENT)
Facility: MEDICAL CENTER | Age: 42
End: 2022-01-23
Attending: STUDENT IN AN ORGANIZED HEALTH CARE EDUCATION/TRAINING PROGRAM
Payer: MEDICAID

## 2022-01-23 ENCOUNTER — OFFICE VISIT (OUTPATIENT)
Dept: URGENT CARE | Facility: CLINIC | Age: 42
End: 2022-01-23
Payer: MEDICAID

## 2022-01-23 VITALS
HEART RATE: 82 BPM | BODY MASS INDEX: 38.09 KG/M2 | RESPIRATION RATE: 18 BRPM | HEIGHT: 62 IN | TEMPERATURE: 96.7 F | SYSTOLIC BLOOD PRESSURE: 120 MMHG | DIASTOLIC BLOOD PRESSURE: 70 MMHG | OXYGEN SATURATION: 97 % | WEIGHT: 207 LBS

## 2022-01-23 DIAGNOSIS — R21 RASH AND NONSPECIFIC SKIN ERUPTION: ICD-10-CM

## 2022-01-23 DIAGNOSIS — R53.81 MALAISE AND FATIGUE: ICD-10-CM

## 2022-01-23 DIAGNOSIS — R53.83 MALAISE AND FATIGUE: ICD-10-CM

## 2022-01-23 PROCEDURE — U0003 INFECTIOUS AGENT DETECTION BY NUCLEIC ACID (DNA OR RNA); SEVERE ACUTE RESPIRATORY SYNDROME CORONAVIRUS 2 (SARS-COV-2) (CORONAVIRUS DISEASE [COVID-19]), AMPLIFIED PROBE TECHNIQUE, MAKING USE OF HIGH THROUGHPUT TECHNOLOGIES AS DESCRIBED BY CMS-2020-01-R: HCPCS

## 2022-01-23 PROCEDURE — 99213 OFFICE O/P EST LOW 20 MIN: CPT | Mod: 25,CS | Performed by: STUDENT IN AN ORGANIZED HEALTH CARE EDUCATION/TRAINING PROGRAM

## 2022-01-23 PROCEDURE — 96372 THER/PROPH/DIAG INJ SC/IM: CPT | Performed by: STUDENT IN AN ORGANIZED HEALTH CARE EDUCATION/TRAINING PROGRAM

## 2022-01-23 PROCEDURE — U0005 INFEC AGEN DETEC AMPLI PROBE: HCPCS

## 2022-01-23 RX ORDER — METHYLPREDNISOLONE 4 MG/1
TABLET ORAL
Qty: 21 TABLET | Refills: 0 | Status: SHIPPED | OUTPATIENT
Start: 2022-01-23 | End: 2022-02-08

## 2022-01-23 RX ORDER — DEXAMETHASONE SODIUM PHOSPHATE 10 MG/ML
10 INJECTION INTRAMUSCULAR; INTRAVENOUS ONCE
Status: COMPLETED | OUTPATIENT
Start: 2022-01-23 | End: 2022-01-23

## 2022-01-23 RX ADMIN — DEXAMETHASONE SODIUM PHOSPHATE 10 MG: 10 INJECTION INTRAMUSCULAR; INTRAVENOUS at 16:11

## 2022-01-23 ASSESSMENT — FIBROSIS 4 INDEX: FIB4 SCORE: 1.55

## 2022-01-24 NOTE — PROGRESS NOTES
"Subjective     Mary Gold is a 41 y.o. female who presents with Rash (bilateral legs ithcy/hard to sleep, watery eyes, fatigue x 4 days  )            Patient is a very agreeable 41-year-old female that presents with a history of lower extremity bilateral rash nonerythematous.  At the point of her hip she also endorses mild joint pain in addition to watery eyes over the last 4 days since this rash has been going on.  Patient does have a significant recent sick contacts in the form of her daughter who tested positive for COVID-19.  Patient presents for further evaluation.      Review of Systems   Skin: Positive for itching and rash.   All other systems reviewed and are negative.             Objective     /70   Pulse 82   Temp 35.9 °C (96.7 °F)   Resp 18   Ht 1.575 m (5' 2\")   Wt 93.9 kg (207 lb)   SpO2 97%   BMI 37.86 kg/m²      Physical Exam  Vitals reviewed.   Constitutional:       Appearance: Normal appearance.   Skin:     Comments: Maculopapular nonerythematous rash extending bilateral legs up to hip.   Neurological:      Mental Status: She is alert.                             Assessment & Plan        1. Rash and nonspecific skin eruption  Patient with rash bilateral lower extremities patient does have recent history of approximately month and a half ago of strep pharyngitis which she was treated at least x2.  This may represent reactive arthritis although etiology unclear.  Patient denies recent sick contacts except her daughter who has COVID-19.  Patient denies any recent new detergents clothing new animals or pets or allergies otherwise.  Plan:  1.  Decadron 10 mg intramuscular injection  2.  Solu-Medrol Dosepak to be taken as prescribed  3.  Over-the-counter Claritin.  - dexamethasone (DECADRON) injection (check route below) 10 mg  - methylPREDNISolone (MEDROL DOSEPAK) 4 MG Tablet Therapy Pack; Follow schedule on package instructions.  Dispense: 21 Tablet; Refill: 0    2. Malaise and " fatigue  Patient does have significant recent sick contact in the form of her daughter just positive COVID-19  Plan:  1.  COVID-19 PCR testing.  - SARS-CoV-2 PCR (24 hour In-House): Collect NP swab in [facility]; Future

## 2022-01-25 DIAGNOSIS — R53.83 MALAISE AND FATIGUE: ICD-10-CM

## 2022-01-25 DIAGNOSIS — R53.81 MALAISE AND FATIGUE: ICD-10-CM

## 2022-01-25 LAB
COVID ORDER STATUS COVID19: NORMAL
SARS-COV-2 RNA RESP QL NAA+PROBE: DETECTED
SPECIMEN SOURCE: ABNORMAL

## 2022-02-08 ENCOUNTER — OFFICE VISIT (OUTPATIENT)
Dept: URGENT CARE | Facility: CLINIC | Age: 42
End: 2022-02-08
Payer: MEDICAID

## 2022-02-08 VITALS
OXYGEN SATURATION: 96 % | BODY MASS INDEX: 37.91 KG/M2 | RESPIRATION RATE: 14 BRPM | WEIGHT: 206 LBS | TEMPERATURE: 97.1 F | HEIGHT: 62 IN | SYSTOLIC BLOOD PRESSURE: 120 MMHG | HEART RATE: 76 BPM | DIASTOLIC BLOOD PRESSURE: 60 MMHG

## 2022-02-08 DIAGNOSIS — E11.9 TYPE 2 DIABETES MELLITUS WITHOUT COMPLICATION, WITHOUT LONG-TERM CURRENT USE OF INSULIN (HCC): ICD-10-CM

## 2022-02-08 DIAGNOSIS — U09.9 PERSISTENT FATIGUE AFTER COVID-19: ICD-10-CM

## 2022-02-08 DIAGNOSIS — H65.02 ACUTE SEROUS OTITIS MEDIA OF LEFT EAR, RECURRENCE NOT SPECIFIED: ICD-10-CM

## 2022-02-08 DIAGNOSIS — R53.83 PERSISTENT FATIGUE AFTER COVID-19: ICD-10-CM

## 2022-02-08 LAB
HBA1C MFR BLD: 7.8 % (ref 0–5.6)
INT CON NEG: NEGATIVE
INT CON POS: POSITIVE

## 2022-02-08 PROCEDURE — 83036 HEMOGLOBIN GLYCOSYLATED A1C: CPT | Performed by: PHYSICIAN ASSISTANT

## 2022-02-08 PROCEDURE — 99215 OFFICE O/P EST HI 40 MIN: CPT | Performed by: PHYSICIAN ASSISTANT

## 2022-02-08 RX ORDER — AMOXICILLIN AND CLAVULANATE POTASSIUM 875; 125 MG/1; MG/1
1 TABLET, FILM COATED ORAL 2 TIMES DAILY
Qty: 14 TABLET | Refills: 0 | Status: SHIPPED | OUTPATIENT
Start: 2022-02-08 | End: 2022-02-15

## 2022-02-08 ASSESSMENT — FIBROSIS 4 INDEX: FIB4 SCORE: 1.55

## 2022-02-09 RX ORDER — PIOGLITAZONEHYDROCHLORIDE 30 MG/1
30 TABLET ORAL DAILY
Qty: 30 TABLET | Refills: 1 | Status: SHIPPED | OUTPATIENT
Start: 2022-02-09 | End: 2022-05-11

## 2022-02-10 ENCOUNTER — OFFICE VISIT (OUTPATIENT)
Dept: INTERNAL MEDICINE | Facility: OTHER | Age: 42
End: 2022-02-10
Attending: PHYSICIAN ASSISTANT
Payer: MEDICAID

## 2022-02-10 VITALS
WEIGHT: 206.2 LBS | SYSTOLIC BLOOD PRESSURE: 107 MMHG | HEIGHT: 62 IN | OXYGEN SATURATION: 97 % | HEART RATE: 87 BPM | DIASTOLIC BLOOD PRESSURE: 65 MMHG | TEMPERATURE: 98.2 F | BODY MASS INDEX: 37.94 KG/M2

## 2022-02-10 DIAGNOSIS — E66.9 OBESITY (BMI 30-39.9): ICD-10-CM

## 2022-02-10 DIAGNOSIS — E11.65 TYPE 2 DIABETES MELLITUS WITH HYPERGLYCEMIA, WITHOUT LONG-TERM CURRENT USE OF INSULIN (HCC): ICD-10-CM

## 2022-02-10 DIAGNOSIS — R74.8 ELEVATED LIVER ENZYMES: ICD-10-CM

## 2022-02-10 DIAGNOSIS — E66.09 CLASS 2 OBESITY DUE TO EXCESS CALORIES WITHOUT SERIOUS COMORBIDITY WITH BODY MASS INDEX (BMI) OF 37.0 TO 37.9 IN ADULT: ICD-10-CM

## 2022-02-10 PROBLEM — K76.0 NAFL (NONALCOHOLIC FATTY LIVER): Status: ACTIVE | Noted: 2022-02-10

## 2022-02-10 PROCEDURE — 99204 OFFICE O/P NEW MOD 45 MIN: CPT | Mod: GC | Performed by: STUDENT IN AN ORGANIZED HEALTH CARE EDUCATION/TRAINING PROGRAM

## 2022-02-10 ASSESSMENT — ENCOUNTER SYMPTOMS
ABDOMINAL PAIN: 0
SINUS PAIN: 1
WEIGHT LOSS: 0
WHEEZING: 0
POLYDIPSIA: 0
NERVOUS/ANXIOUS: 1
SORE THROAT: 0
BLURRED VISION: 0
VOMITING: 0
SPUTUM PRODUCTION: 0
SHORTNESS OF BREATH: 0
CHILLS: 0
DOUBLE VISION: 0
DIZZINESS: 0
MYALGIAS: 0
DIARRHEA: 0
NAUSEA: 0
HEADACHES: 0
FEVER: 0
COUGH: 0

## 2022-02-10 ASSESSMENT — PATIENT HEALTH QUESTIONNAIRE - PHQ9: CLINICAL INTERPRETATION OF PHQ2 SCORE: 0

## 2022-02-10 ASSESSMENT — FIBROSIS 4 INDEX: FIB4 SCORE: 1.55

## 2022-02-10 ASSESSMENT — PAIN SCALES - GENERAL: PAINLEVEL: NO PAIN

## 2022-02-10 NOTE — PROGRESS NOTES
Subjective     Mary Gold is a 41 y.o. female who presents with Fatigue (Post COVID- anxious, checking glucose 340. Fatigue. Weight gain. (L) ear pain/ throat. Sinsus/nasal. Sinus pressure/headache. )            The patient is here for complaints of post-COVID fatigue, elevated blood sugars, left ear pain, and sinus congestion and headache. She was diagnosed with COVID on 1/23/2022. She had a strange rash and was given a shot of corticosteroid. Since then she continues to have fatigue and has developed sinus pressure/congestion and left ear pain over the past week. Additionally, after she got her shot she felt like she did when her sugars were elevated with her gestational diabetes. She took her blood sugar at home and it has been high as 350. Today she took it in the morning and it was mid 200s. She complains of anxious feeling and fatigue associated with her high blood sugars. She did take Insulin for her Gestational Diabetes but her hyperglycemia resolved shortly after having the baby. She denies recent fever. She has no polydipsia or polyuria. She does also have history of elevated liver enzymes seen about 8 months ago on labs in the ED.       Past Medical History:   Diagnosis Date   • Chiari malformation    • Dizziness    • Fatigue    • Heart burn    • Indigestion    • Numbness and tingling in hands    • Other acute pain     headaches   • Unspecified hemorrhagic conditions        Past Surgical History:   Procedure Laterality Date   • CRANIECTOMY  8/17/2015    Procedure: SUBOCCIPITAL CRANIECTOMY ;  Surgeon: Tenzin Curtis M.D.;  Location: [facility];  Service:    • CERVICAL LAMINECTOMY POSTERIOR  8/17/2015    Procedure: CERVICAL LAMINECTOMY POSTERIOR C1, DUROPLASTY;  Surgeon: Tenzin Curtis M.D.;  Location: SURGERY [facility];  Service:    • GYN SURGERY     • OTHER      eye       No family history on file.    No Known Allergies    Medications, Allergies, and current problem list reviewed  "today in Epic      Review of Systems   Constitutional: Positive for malaise/fatigue. Negative for chills, fever and weight loss.   HENT: Positive for congestion, ear pain (left ear) and sinus pain. Negative for ear discharge and sore throat.    Eyes: Negative for blurred vision and double vision.   Respiratory: Negative for cough, sputum production, shortness of breath and wheezing.    Cardiovascular: Negative for chest pain and leg swelling.   Gastrointestinal: Negative for abdominal pain, diarrhea, nausea and vomiting.   Genitourinary: Negative for dysuria, frequency and urgency.   Musculoskeletal: Negative for myalgias.   Skin: Negative for rash.   Neurological: Negative for dizziness and headaches.   Endo/Heme/Allergies: Negative for polydipsia.   Psychiatric/Behavioral: The patient is nervous/anxious.          All other systems reviewed and are negative.       Objective     /60   Pulse 76   Temp 36.2 °C (97.1 °F)   Resp 14   Ht 1.575 m (5' 2\")   Wt 93.4 kg (206 lb)   SpO2 96%   BMI 37.68 kg/m²      Physical Exam  Constitutional:       General: She is not in acute distress.     Appearance: She is not ill-appearing.   HENT:      Head: Normocephalic and atraumatic.      Right Ear: Tympanic membrane, ear canal and external ear normal.      Left Ear: External ear normal. No swelling. A middle ear effusion is present. Tympanic membrane is injected.      Nose: Mucosal edema, congestion and rhinorrhea present.      Right Sinus: Maxillary sinus tenderness present.      Left Sinus: Maxillary sinus tenderness present.   Eyes:      Conjunctiva/sclera: Conjunctivae normal.   Cardiovascular:      Rate and Rhythm: Normal rate and regular rhythm.      Heart sounds: Normal heart sounds.   Pulmonary:      Effort: Pulmonary effort is normal. No respiratory distress.      Breath sounds: Normal breath sounds. No stridor. No wheezing, rhonchi or rales.   Musculoskeletal:         General: Normal range of motion. "   Lymphadenopathy:      Cervical: No cervical adenopathy.   Skin:     General: Skin is warm and dry.      Findings: No rash.   Neurological:      General: No focal deficit present.      Mental Status: She is alert and oriented to person, place, and time.   Psychiatric:         Mood and Affect: Mood normal.         Behavior: Behavior normal.         Thought Content: Thought content normal.         Judgment: Judgment normal.                         poct glucose- 176    Poct A1C- 7.8    CMP- mildly elevated liver enzymes  GFR- >60         Assessment & Plan        1. Type 2 diabetes mellitus without complication, without long-term current use of insulin ([facility])    Discussed findings with colleague  in Endocrinology.  Start Actos- Referral to follow-up with PCP and Endocrinology referral placed.  Continue to monitor sugars.   - Referral to establish with [facility] PCP  - Comp Metabolic Panel; Future  - POCT  A1C  - Referral to Endocrinology  - pioglitazone (ACTOS) 30 MG Tab; Take 1 Tablet by mouth every day.  Dispense: 30 Tablet; Refill: 1    2. Acute serous otitis media of left ear, recurrence not specified    - amoxicillin-clavulanate (AUGMENTIN) 875-125 MG Tab; Take 1 Tablet by mouth 2 times a day for 7 days.  Dispense: 14 Tablet; Refill: 0  - Referral to establish with [facility] PCP    3. Persistent fatigue after COVID-19    Post-covid fatigue and hyperglycemia. Hopefully with time and Diabetes control- symptoms will improve.   - Referral to establish with [facility] PCP    Differential diagnoses, Supportive care, and indications for immediate follow-up discussed with patient.   Pathogenesis of diagnosis discussed including typical length and natural progression.   Instructed to return to clinic or nearest emergency department for any change in condition, further concerns, or worsening of symptoms.      My total time spent caring for the patient on the day of the encounter was >40  minutes.   This does not include  time spent on separately billable procedures/tests.    The patient demonstrated a good understanding and agreed with the treatment plan.    Beryl Tristan P.A.-C.

## 2022-02-11 PROBLEM — R74.8 ELEVATED LIVER ENZYMES: Status: ACTIVE | Noted: 2022-02-10

## 2022-02-11 PROBLEM — E11.65 TYPE 2 DIABETES MELLITUS WITH HYPERGLYCEMIA, WITHOUT LONG-TERM CURRENT USE OF INSULIN (HCC): Status: ACTIVE | Noted: 2022-02-11

## 2022-02-11 PROBLEM — E66.812 CLASS 2 OBESITY DUE TO EXCESS CALORIES WITHOUT SERIOUS COMORBIDITY WITH BODY MASS INDEX (BMI) OF 37.0 TO 37.9 IN ADULT: Status: ACTIVE | Noted: 2022-02-11

## 2022-02-11 PROBLEM — E66.9 OBESITY (BMI 30-39.9): Status: ACTIVE | Noted: 2022-02-11

## 2022-02-11 PROBLEM — E66.09 CLASS 2 OBESITY DUE TO EXCESS CALORIES WITHOUT SERIOUS COMORBIDITY WITH BODY MASS INDEX (BMI) OF 37.0 TO 37.9 IN ADULT: Status: ACTIVE | Noted: 2022-02-11

## 2022-02-11 ASSESSMENT — ENCOUNTER SYMPTOMS
FEVER: 0
CARDIOVASCULAR NEGATIVE: 1
RESPIRATORY NEGATIVE: 1
WEIGHT LOSS: 0
HALLUCINATIONS: 0
NERVOUS/ANXIOUS: 1
EYES NEGATIVE: 1
NEUROLOGICAL NEGATIVE: 1
GASTROINTESTINAL NEGATIVE: 1
DEPRESSION: 0
CHILLS: 0
MUSCULOSKELETAL NEGATIVE: 1

## 2022-02-11 ASSESSMENT — LIFESTYLE VARIABLES: SUBSTANCE_ABUSE: 0

## 2022-02-11 NOTE — ASSESSMENT & PLAN NOTE
"Patient has risk factors including personal history of gestational diabetes, obesity and family history of diabetes/prediabetes.  Unclear if patient already had prediabetes or diabetes with worsening hyperglycemia due to COVID and steroids use.  A1C of 7.8   Plan:  Patient is already started on pioglitazone by urgent care provider. As per chart review\" discussed with  in Endocrinology\". Possibly the choice of drug based on her insurance coverage and given that patient might also has fatty liver ( elevated LFTs) +/- post COVID hyperglycemia ( pioglitazone with ?antiinflammatory effect).  We will continue the current therapy for now.  Patient already has referral to endocrinology  Dietician referral provided   Healthy low carb diet and exercise , weight loss  "

## 2022-02-11 NOTE — PATIENT INSTRUCTIONS
Continue pioglitazone  Labs and US ordered  Follow up with endocrinology  Referral to dietician  Recommended low carb diet and exercise  Recommended at least 5-10 lbs in next 3 months

## 2022-02-11 NOTE — PROGRESS NOTES
"Subjective     Mary Gold is a 41 y.o. female who presents with New Patient and Diabetes    Past medical history of Chiari I malformation s/p surgery , gestational diabetes ( resolved after delivery 03/21   ), recent COVID 19 infection ( 01/23/22), non alcoholic fatty liver and Obesity who presented to establish care with us.  Patient was seen at [facility] urgent care on 01/23 when she received steroid injection and medrol dose pack  for a rash and was found to be positive for COVID 19. Patient reports not feeling so good ( fatigue, jittery) similar to how she had gestational diabetes which worried her so she checked her BS ( had supplies at home) which showed BS of 250-300s. She was seen again in urgent care 2 days back  when she found to have A1C of 7.8 . Patient also reports that she was told she has fatty liver. She was  started on pioglitazone for hyperglycemia treatment and also on antibiotics for possible otitis media infection.    Obesity: BMI of 37.7  Patient reports that before pregnancy she was 180 lbs and may have gained 12-15 lbs during pregnancy. But even after delivery she continued to gain weight may be another 15 lbs( now 206).   Family history of diabetes and prediabetes on mother's side.   Review of Systems   Constitutional: Positive for malaise/fatigue. Negative for chills, fever and weight loss.   HENT: Negative.    Eyes: Negative.    Respiratory: Negative.    Cardiovascular: Negative.    Gastrointestinal: Negative.    Genitourinary: Negative.    Musculoskeletal: Negative.    Skin: Negative.    Neurological: Negative.    Psychiatric/Behavioral: Negative for depression, hallucinations, substance abuse and suicidal ideas. The patient is nervous/anxious.         Worried and anxious about the new diabetes at this early age.           Objective     /65 (BP Location: Left arm, Patient Position: Sitting, BP Cuff Size: Adult)   Pulse 87   Temp 36.8 °C (98.2 °F) (Temporal)   Ht 1.575 m (5' 2\")  "  Wt 93.5 kg (206 lb 3.2 oz)   SpO2 97%   BMI 37.71 kg/m²      Physical Exam  Constitutional:       General: She is not in acute distress.     Appearance: Normal appearance. She is obese. She is not ill-appearing.   HENT:      Head: Normocephalic and atraumatic.      Mouth/Throat:      Mouth: Mucous membranes are moist.      Pharynx: Oropharynx is clear.   Eyes:      Extraocular Movements: Extraocular movements intact.      Conjunctiva/sclera: Conjunctivae normal.   Cardiovascular:      Rate and Rhythm: Normal rate and regular rhythm.      Pulses: Normal pulses.      Heart sounds: Normal heart sounds.   Pulmonary:      Effort: Pulmonary effort is normal.      Breath sounds: Normal breath sounds.   Abdominal:      General: Bowel sounds are normal. There is no distension.      Palpations: Abdomen is soft.      Tenderness: There is no abdominal tenderness.   Skin:     General: Skin is warm.      Capillary Refill: Capillary refill takes less than 2 seconds.   Neurological:      General: No focal deficit present.      Mental Status: She is alert and oriented to person, place, and time.   Psychiatric:         Mood and Affect: Mood normal.         Behavior: Behavior normal.             Assessment & Plan     Class 2 obesity due to excess calories without serious comorbidity with body mass index (BMI) of 37.0 to 37.9 in adult  Increased weight gain during and after pregnancy   excessive calorie intake and lack of exercise is the likely cause.   She report recently starting exercising .  Plan:  Counseling provided for healthy low carb diet   Dietary counseling and educational material provided   Referral to dietician for further assistance   regualr exercise of 45-60 min /day at least 5 days a week is recommended  Goal 5% weight loss by next visit         Type 2 diabetes mellitus with hyperglycemia, without long-term current use of insulin (HCC)  Patient has risk factors including personal history of gestational diabetes,  "obesity and family history of diabetes/prediabetes.  Unclear if patient already had prediabetes or diabetes with worsening hyperglycemia due to COVID and steroids use.  A1C of 7.8   Plan:  Patient is already started on pioglitazone by urgent care provider. As per chart review\" discussed with  in Endocrinology\". Possibly the choice of drug based on her insurance coverage and given that patient might also has fatty liver ( elevated LFTs) +/- post COVID hyperglycemia ( pioglitazone with ?antiinflammatory effect).  We will continue the current therapy for now.  Patient already has referral to endocrinology  Dietician referral provided   Healthy low carb diet and exercise , weight loss    Elevated liver enzymes  Possibly due to obesity / non alcoholic fatty liver   Plan:  Will get US RUQ   Counseling provided for weight loss    Follow up in 3 months         "

## 2022-02-11 NOTE — ASSESSMENT & PLAN NOTE
Increased weight gain during and after pregnancy   excessive calorie intake and lack of exercise is the likely cause.   She report recently starting exercising .  Plan:  Counseling provided for healthy low carb diet   Dietary counseling and educational material provided   Referral to dietician for further assistance   regualr exercise of 45-60 min /day at least 5 days a week is recommended  Goal 5% weight loss by next visit

## 2022-02-11 NOTE — ASSESSMENT & PLAN NOTE
Possibly due to obesity / non alcoholic fatty liver   Plan:  Will get US RUQ   Counseling provided for weight loss

## 2022-02-25 ENCOUNTER — APPOINTMENT (OUTPATIENT)
Dept: RADIOLOGY | Facility: MEDICAL CENTER | Age: 42
End: 2022-02-25
Attending: STUDENT IN AN ORGANIZED HEALTH CARE EDUCATION/TRAINING PROGRAM
Payer: MEDICAID

## 2022-04-14 ENCOUNTER — TELEPHONE (OUTPATIENT)
Dept: INTERNAL MEDICINE | Facility: OTHER | Age: 42
End: 2022-04-14

## 2022-04-14 NOTE — TELEPHONE ENCOUNTER
Patient came in stating that she had an appointment to get her imaging done and she was not able to make it because she had eye surgery. Patient forgot to cancel the appointment, and she's been trying to get of hold of them and she has not been able to get of hold of them. She wants to know if we can schedule the appointment with the imaging department. I did tell her that were not able to to it for her, and she wants Dr Horvath to give her a call.

## 2022-04-27 ENCOUNTER — HOSPITAL ENCOUNTER (OUTPATIENT)
Dept: RADIOLOGY | Facility: MEDICAL CENTER | Age: 42
End: 2022-04-27
Attending: STUDENT IN AN ORGANIZED HEALTH CARE EDUCATION/TRAINING PROGRAM
Payer: MEDICAID

## 2022-04-27 DIAGNOSIS — E66.09 CLASS 2 OBESITY DUE TO EXCESS CALORIES WITHOUT SERIOUS COMORBIDITY WITH BODY MASS INDEX (BMI) OF 37.0 TO 37.9 IN ADULT: ICD-10-CM

## 2022-04-27 DIAGNOSIS — R74.8 ELEVATED LIVER ENZYMES: ICD-10-CM

## 2022-04-27 PROCEDURE — 76705 ECHO EXAM OF ABDOMEN: CPT

## 2022-05-18 ENCOUNTER — OFFICE VISIT (OUTPATIENT)
Dept: URGENT CARE | Facility: CLINIC | Age: 42
End: 2022-05-18
Payer: MEDICAID

## 2022-05-18 VITALS
SYSTOLIC BLOOD PRESSURE: 118 MMHG | OXYGEN SATURATION: 99 % | RESPIRATION RATE: 16 BRPM | DIASTOLIC BLOOD PRESSURE: 76 MMHG | HEIGHT: 65 IN | TEMPERATURE: 97.8 F | WEIGHT: 195 LBS | HEART RATE: 77 BPM | BODY MASS INDEX: 32.49 KG/M2

## 2022-05-18 DIAGNOSIS — K52.9 GASTROENTERITIS: ICD-10-CM

## 2022-05-18 DIAGNOSIS — G89.29 CHRONIC LOW BACK PAIN, UNSPECIFIED BACK PAIN LATERALITY, UNSPECIFIED WHETHER SCIATICA PRESENT: ICD-10-CM

## 2022-05-18 DIAGNOSIS — M54.50 CHRONIC LOW BACK PAIN, UNSPECIFIED BACK PAIN LATERALITY, UNSPECIFIED WHETHER SCIATICA PRESENT: ICD-10-CM

## 2022-05-18 PROCEDURE — 99213 OFFICE O/P EST LOW 20 MIN: CPT | Performed by: PHYSICIAN ASSISTANT

## 2022-05-18 ASSESSMENT — ENCOUNTER SYMPTOMS
FEVER: 0
BLOOD IN STOOL: 0
CHILLS: 0
ABDOMINAL PAIN: 0
VOMITING: 0
BACK PAIN: 1
ROS GI COMMENTS: 1
NAUSEA: 1
DIARRHEA: 0
CONSTIPATION: 0
MYALGIAS: 1

## 2022-05-18 ASSESSMENT — FIBROSIS 4 INDEX: FIB4 SCORE: 1.55

## 2022-05-19 NOTE — PROGRESS NOTES
"Subjective:   Mary Gold  is a 41 y.o. female who presents for Nasal Congestion (Head Pressure ), Back Pain, GI Problem, and Headache      Sinus Problem  This is a new problem. The current episode started in the past 7 days. Pertinent negatives include no chills.   Patient presents urgent care with a multitude of complaints but decides upon chills and body aches as well as back pain to discuss in urgent care today.  She will follow-up with primary care for additional complaints.  She does note suspected infectious gastroenteritis from child.  Youngest child does have diarrhea with low-grade fever at onset.  They do have a family member with similar symptoms he did have a fever they suspect initially got them sick.  Patient acknowledges some congestion and headache as well.  She notes a long running history of back pain and requests referral to follow-up with back specialist following today's evaluation.  Denies vomiting or abdominal pain.  Denies urinary symptoms.  Has tried treatment with fluids, electrolytes and over-the-counter vitamins and supplements.  Patient denies nausea vomiting but complains of dyspepsia and abdominal cramping presumed secondary to exposure to youngest child/gastroenteritis.    Review of Systems   Constitutional: Positive for malaise/fatigue. Negative for chills and fever.   Gastrointestinal: Positive for nausea. Negative for abdominal pain, blood in stool, constipation, diarrhea, melena and vomiting.   Musculoskeletal: Positive for back pain and myalgias.   Skin: Negative for rash.       No Known Allergies     Objective:   /76 (BP Location: Right arm, Patient Position: Sitting, BP Cuff Size: Adult)   Pulse 77   Temp 36.6 °C (97.8 °F) (Temporal)   Resp 16   Ht 1.651 m (5' 5\")   Wt 88.5 kg (195 lb)   SpO2 99%   BMI 32.45 kg/m²     Physical Exam  Vitals and nursing note reviewed.   Constitutional:       General: She is not in acute distress.     Appearance: Normal " appearance. She is well-developed. She is not diaphoretic.   HENT:      Head: Normocephalic and atraumatic.      Right Ear: External ear normal.      Left Ear: External ear normal.      Nose: Nose normal.      Mouth/Throat:      Pharynx: Uvula midline.   Eyes:      General: Lids are normal. No scleral icterus.        Right eye: No discharge.         Left eye: No discharge.      Conjunctiva/sclera: Conjunctivae normal.   Pulmonary:      Effort: Pulmonary effort is normal. No respiratory distress.   Abdominal:      General: Bowel sounds are increased.      Palpations: Abdomen is soft. Abdomen is not rigid.      Tenderness: There is no abdominal tenderness. There is no right CVA tenderness, left CVA tenderness, guarding or rebound. Negative signs include Mathew's sign and McBurney's sign.   Musculoskeletal:         General: Normal range of motion.      Cervical back: Neck supple.      Lumbar back: Tenderness present. No swelling, edema, deformity, signs of trauma, lacerations, spasms or bony tenderness. Normal range of motion. Negative right straight leg raise test and negative left straight leg raise test. No scoliosis.   Lymphadenopathy:      Cervical: No cervical adenopathy.   Skin:     General: Skin is warm and dry.      Coloration: Skin is not pale.      Findings: No erythema.   Neurological:      Mental Status: She is alert and oriented to person, place, and time. She is not disoriented.   Psychiatric:         Speech: Speech normal.         Behavior: Behavior normal.         Assessment/Plan:   1. Gastroenteritis    2. Chronic low back pain, unspecified back pain laterality, unspecified whether sciatica present  Supportive care is reviewed with patient/caregiver - recommend to push PO fluids and electrolytes, principles of fluid resuscitation reviewed with patient, she declines an antiemetic stating she does have mild nausea describes more dyspepsia and stomach cramping, red flag symptoms are reviewed    Referral  to follow-up with orthopedics for back pain has been placed today per patient request  Stressed the importance of family medicine follow-up.  Return to clinic with lack of resolution or progression of symptoms.  ER precautions with any worsening symptoms are reviewed with patient/caregiver and they do express understanding    I have worn an N95 mask, gloves and eye protection for the entire encounter with this patient.     Differential diagnosis, natural history, supportive care, and indications for immediate follow-up discussed.

## 2022-05-26 ENCOUNTER — OFFICE VISIT (OUTPATIENT)
Dept: INTERNAL MEDICINE | Facility: OTHER | Age: 42
End: 2022-05-26
Payer: MEDICAID

## 2022-05-26 VITALS
HEART RATE: 63 BPM | HEIGHT: 62 IN | WEIGHT: 197 LBS | DIASTOLIC BLOOD PRESSURE: 60 MMHG | TEMPERATURE: 97.5 F | OXYGEN SATURATION: 96 % | BODY MASS INDEX: 36.25 KG/M2 | SYSTOLIC BLOOD PRESSURE: 112 MMHG

## 2022-05-26 DIAGNOSIS — R16.0 HEPATOMEGALY: ICD-10-CM

## 2022-05-26 DIAGNOSIS — E11.65 TYPE 2 DIABETES MELLITUS WITH HYPERGLYCEMIA, WITHOUT LONG-TERM CURRENT USE OF INSULIN (HCC): ICD-10-CM

## 2022-05-26 DIAGNOSIS — R53.83 FATIGUE, UNSPECIFIED TYPE: ICD-10-CM

## 2022-05-26 DIAGNOSIS — E66.09 CLASS 2 OBESITY DUE TO EXCESS CALORIES WITHOUT SERIOUS COMORBIDITY WITH BODY MASS INDEX (BMI) OF 37.0 TO 37.9 IN ADULT: ICD-10-CM

## 2022-05-26 LAB
HBA1C MFR BLD: 6 % (ref 0–5.6)
INT CON NEG: ABNORMAL
INT CON POS: ABNORMAL

## 2022-05-26 PROCEDURE — 99213 OFFICE O/P EST LOW 20 MIN: CPT | Mod: GE | Performed by: STUDENT IN AN ORGANIZED HEALTH CARE EDUCATION/TRAINING PROGRAM

## 2022-05-26 PROCEDURE — 83036 HEMOGLOBIN GLYCOSYLATED A1C: CPT | Performed by: STUDENT IN AN ORGANIZED HEALTH CARE EDUCATION/TRAINING PROGRAM

## 2022-05-26 ASSESSMENT — FIBROSIS 4 INDEX: FIB4 SCORE: 1.55

## 2022-05-26 NOTE — PATIENT INSTRUCTIONS
Medication changed to metfromin 500 mg once daily   Stop taking pioglitazone   Healthy diet , low carb diet and exercise

## 2022-05-26 NOTE — PROGRESS NOTES
"Subjective     Mary Gold is a 41 y.o. female who presents with Follow-Up (Diabetes )    Past medical history of Chiari I malformation s/p surgery , gestational diabetes ( resolved after delivery 03/21   ),COVID 19 infection ( 01/23/22), non alcoholic fatty liver ,Obesity and recently diagnosed type 2 DM  who presents for follow up.    Type 2 Diabetes :   Last  A1C of 7.8 . She was started on pioglitazone for hyperglycemia treatment and was referred to endocrinology by urgent care , patient reports she has an upcoming appointment in a week. States that she is trying to follow healthy diet but is feeling wvery tired and more day time sleepiness. Per patient she is Eating food late at night might be a reason why not able to loss weight. Denies snoring or gasping for air. Stop BANG score is 2, low risj for sleep apnea.       Hepatomegaly:  Elevated liver enzymes on lab from 2021  US RUQ showed mildly enlarged liver without any mass  Possibly fatty liver , no red flag symptoms.       ROS      General: No fevers, chills, night sweats, weight loss or gain  HEENT: No hearing changes, vision changes, eye pain, ear pain, nasal discharge, sore throat  Neck: No swelling in neck  Pulmonary: No shortness of breath, cough, sputum, or hemoptysis  Cardiovascular: No chest pain, palpitations, or LE swelling  GI: No nausea, vomiting, diarrhea, constipation, abdominal pain, hematochezia or melena  : No dysuria or frequency  Neuro: No focal weakness, no general weakness, no headaches, no lightheadedness, no dizziness  Psych: No anxiety or depression      Objective     /60 (BP Location: Left arm, Patient Position: Sitting, BP Cuff Size: Adult)   Pulse 63   Temp 36.4 °C (97.5 °F) (Temporal)   Ht 1.575 m (5' 2\")   Wt 89.4 kg (197 lb)   SpO2 96%   BMI 36.03 kg/m²      Physical Exam      General: Well developed, well nourished female, in no distress.  HEENT: NC/AT, PERRL, EOMI, no scleral icterus or conjunctival pallor, " fair dentition/denture in, no nasal discharge or oral erythema or exudates.   Neck: Supple, No cervical or supraclavicular LAD  CV:RRR, no murmurs gallops or rubs  Pulm: LCAB, no crackles, rales, rhonchi, or wheezing  GI: Normal bowel sounds, abdomen soft, nontender, nondistended to deep or light palpation in all 4 quadrants, no HSM.  MSK: normal ROM.Radial and dorsalis pedis pulses 2+ and equal bilaterally, respectively.No lower extremity edema  Neuro: Patient is alert and oriented x3, no focal deficits,Strength 5 out of 5 in upper and lower extremities  Psych: Appropriate mood and affect       Assessment & Plan        Type 2 diabetes mellitus with hyperglycemia, without long-term current use of insulin (HCC)  recent diagnosis of type 2 diabetes with last A1C of 7.8  Today POC A1C was 6, much improved.    Plan:  Discussed with patient that metformin might be a better choice compared to pioglitazone   Given A1c  Has improved, will start on lower dose - metformin 500 mg daily   Discontinue pioglitazone .  Patient is counseled and recommended to follow up with dietician  Healthy low carb diet and exercise , weight loss    Class 2 obesity due to excess calories without serious comorbidity with body mass index (BMI) of 37.0 to 37.9 in adult  BMI 36.4   Plan:  Counseling provided for healthy low carb diet   Dietary counseling   Recommended to f/u with dietician for further assistance   regualr exercise of 45-60 min /day at least 5 days a week is recommended  Goal 5-10% weight loss by next visit         Fatigue  Patient reporting fatigue causing increased tiredness//sleepiness  Plan:  Will check TSH, Vitamin D and B12 levels

## 2022-05-28 PROBLEM — R53.83 FATIGUE: Status: ACTIVE | Noted: 2022-05-28

## 2022-05-28 PROBLEM — R16.0 HEPATOMEGALY: Status: ACTIVE | Noted: 2022-05-28

## 2022-05-29 NOTE — ASSESSMENT & PLAN NOTE
recent diagnosis of type 2 diabetes with last A1C of 7.8  Today POC A1C was 6, much improved.    Plan:  Discussed with patient that metformin might be a better choice compared to pioglitazone   Given A1c  Has improved, will start on lower dose - metformin 500 mg daily   Discontinue pioglitazone .  Patient is counseled and recommended to follow up with dietician  Healthy low carb diet and exercise , weight loss

## 2022-05-29 NOTE — ASSESSMENT & PLAN NOTE
BMI 36.4   Plan:  Counseling provided for healthy low carb diet   Dietary counseling   Recommended to f/u with dietician for further assistance   regualr exercise of 45-60 min /day at least 5 days a week is recommended  Goal 5-10% weight loss by next visit

## 2022-05-29 NOTE — ASSESSMENT & PLAN NOTE
Patient reporting fatigue causing increased tiredness//sleepiness  Plan:  Will check TSH, Vitamin D and B12 levels

## 2022-06-14 ENCOUNTER — HOSPITAL ENCOUNTER (EMERGENCY)
Facility: MEDICAL CENTER | Age: 42
End: 2022-06-14
Attending: EMERGENCY MEDICINE
Payer: MEDICAID

## 2022-06-14 VITALS
RESPIRATION RATE: 14 BRPM | BODY MASS INDEX: 35.78 KG/M2 | HEART RATE: 79 BPM | WEIGHT: 194.45 LBS | HEIGHT: 62 IN | DIASTOLIC BLOOD PRESSURE: 67 MMHG | OXYGEN SATURATION: 98 % | SYSTOLIC BLOOD PRESSURE: 111 MMHG | TEMPERATURE: 97.3 F

## 2022-06-14 DIAGNOSIS — R68.89 FLU-LIKE SYMPTOMS: ICD-10-CM

## 2022-06-14 DIAGNOSIS — R52 BODY ACHES: ICD-10-CM

## 2022-06-14 LAB
FLUAV RNA SPEC QL NAA+PROBE: NEGATIVE
FLUBV RNA SPEC QL NAA+PROBE: NEGATIVE
RSV RNA SPEC QL NAA+PROBE: NEGATIVE
SARS-COV-2 RNA RESP QL NAA+PROBE: NOTDETECTED
SPECIMEN SOURCE: NORMAL

## 2022-06-14 PROCEDURE — 0241U HCHG SARS-COV-2 COVID-19 NFCT DS RESP RNA 4 TRGT MIC: CPT

## 2022-06-14 PROCEDURE — C9803 HOPD COVID-19 SPEC COLLECT: HCPCS | Performed by: EMERGENCY MEDICINE

## 2022-06-14 PROCEDURE — A9270 NON-COVERED ITEM OR SERVICE: HCPCS | Performed by: EMERGENCY MEDICINE

## 2022-06-14 PROCEDURE — 99283 EMERGENCY DEPT VISIT LOW MDM: CPT

## 2022-06-14 PROCEDURE — 700102 HCHG RX REV CODE 250 W/ 637 OVERRIDE(OP): Performed by: EMERGENCY MEDICINE

## 2022-06-14 RX ORDER — CYCLOBENZAPRINE HCL 10 MG
10 TABLET ORAL 3 TIMES DAILY PRN
Qty: 10 TABLET | Refills: 0 | Status: SHIPPED | OUTPATIENT
Start: 2022-06-14 | End: 2022-06-17

## 2022-06-14 RX ORDER — CYCLOBENZAPRINE HCL 10 MG
10 TABLET ORAL ONCE
Status: COMPLETED | OUTPATIENT
Start: 2022-06-14 | End: 2022-06-14

## 2022-06-14 RX ORDER — IBUPROFEN 800 MG/1
800 TABLET ORAL EVERY 8 HOURS PRN
Qty: 20 TABLET | Refills: 0 | Status: SHIPPED | OUTPATIENT
Start: 2022-06-14 | End: 2022-06-17

## 2022-06-14 RX ORDER — ACETAMINOPHEN 500 MG
1000 TABLET ORAL EVERY 6 HOURS PRN
Qty: 20 TABLET | Refills: 0 | Status: SHIPPED | OUTPATIENT
Start: 2022-06-14 | End: 2022-06-18

## 2022-06-14 RX ORDER — ACETAMINOPHEN 500 MG
1000 TABLET ORAL ONCE
Status: COMPLETED | OUTPATIENT
Start: 2022-06-14 | End: 2022-06-14

## 2022-06-14 RX ADMIN — ACETAMINOPHEN 1000 MG: 500 TABLET, FILM COATED ORAL at 00:28

## 2022-06-14 RX ADMIN — CYCLOBENZAPRINE 10 MG: 10 TABLET, FILM COATED ORAL at 00:28

## 2022-06-14 RX ADMIN — IBUPROFEN 800 MG: 200 TABLET, FILM COATED ORAL at 00:43

## 2022-06-14 ASSESSMENT — FIBROSIS 4 INDEX: FIB4 SCORE: 1.55

## 2022-06-14 NOTE — ED NOTES
Reviewed discharge instructions and prescriptions x 3 sent to selected pharmacy w/ pt, verbalized understanding to information provided including follow up care, return precautions and medication precautions, denied questions/concerns.  Pt ambulated from ED.

## 2022-06-14 NOTE — ED PROVIDER NOTES
ED Provider Note  ED Provider Note    Scribed for Antwan Avila by Antwan Avila. 6/14/2022  12:22 AM    Primary care provider: Mariaelena Horvath M.D.  Means of arrival: Private vehicle  History obtained from: Patient  History limited by: None    CHIEF COMPLAINT  Chief Complaint   Patient presents with   • Flu Like Symptoms     Pt c/o body aches, HA, and congestion started around 1830 last night     HPI  Mary Gold is a 41 y.o. female who presents to the Emergency Department with no contributory medical history, presenting with 24 hours of flulike symptoms including headache, body aches, nasal congestion.  She has not been vaccinated against flu.  She states she feels exactly the same as she did when she had COVID in January of this year.  She has not been boosted against COVID.  She has no known sick contacts or flu or COVID exposures.  Denies alcohol drug or tobacco use.  Denies fevers, constipation, diarrhea, nausea or vomiting.  Review of systems otherwise negative.  Quality: Aching  Duration: 24 hours  Severity: Moderate  Associated sx: Nasal congestion    REVIEW OF SYSTEMS  As above, all other systems reviewed and are negative.   See HPI for further details.     PAST MEDICAL HISTORY   has a past medical history of Chiari malformation, Diabetes (HCC), Dizziness, Fatigue, Heart burn, Indigestion, Numbness and tingling in hands, Other acute pain, and Unspecified hemorrhagic conditions.  SURGICAL HISTORY   has a past surgical history that includes other; craniectomy (8/17/2015); cervical laminectomy posterior (8/17/2015); and gyn surgery.  SOCIAL HISTORY  Social History     Tobacco Use   • Smoking status: Never Smoker   • Smokeless tobacco: Never Used   Vaping Use   • Vaping Use: Unknown   Substance Use Topics   • Alcohol use: Not Currently   • Drug use: No      Social History     Substance and Sexual Activity   Drug Use No     FAMILY HISTORY  Family History   Problem Relation Age of Onset   • Diabetes  "Mother    • Hypertension Mother    • Diabetes Maternal Aunt      CURRENT MEDICATIONS  Home Medications     Reviewed by Darlene Small R.N. (Registered Nurse) on 06/14/22 at 0012  Med List Status: Partial   Medication Last Dose Status   metFORMIN (GLUCOPHAGE) 500 MG Tab  Active              ALLERGIES  No Known Allergies  PHYSICAL EXAM  VITAL SIGNS:   Vitals:    06/14/22 0003 06/14/22 0010   BP: 122/83 134/79   Pulse: 85 88   Resp: 14    Temp: 36.3 °C (97.3 °F)    TempSrc: Oral    SpO2: 98% 95%   Weight: 88.2 kg (194 lb 7.1 oz)    Height: 1.575 m (5' 2\")        Vitals: My interpretation: normotensive, not tachycardic, afebrile, not hypoxic    Reinterpretation of vitals: Unchanged    PE:   Gen: sitting comfortably, speaking clearly, appears in no acute distress   ENT: Mucous membranes moist, posterior pharynx clear, uvula midline, nares patent bilaterally   Neck: Supple, FROM  Pulmonary: Lungs are clear to auscultation bilaterally. No tachypnea  CV:  RRR, no murmur appreciated, pulses 2+ in both upper and lower extremities  Abdomen: soft, NT/ND; no rebound/guarding  : no CVA or suprapubic tenderness   Neuro: A&Ox4 (person, place, time, situation), speech fluent, gait steady, no focal deficits appreciated  Skin: No rash or lesions.  No pallor or jaundice.  No cyanosis.  Warm and dry.     DIAGNOSTIC STUDIES / PROCEDURES    LABS  Results for orders placed or performed during the hospital encounter of 06/14/22   CoV-2, FLU A/B, and RSV by PCR (2-4 Hours CEPHEID) : Collect NP swab in VTM    Specimen: Respirate   Result Value Ref Range    Influenza virus A RNA Negative Negative    Influenza virus B, PCR Negative Negative    RSV, PCR Negative Negative    SARS-CoV-2 by PCR NotDetected     SARS-CoV-2 Source Nasal Swab       All labs reviewed by me. Significant for flu and COVID-negative    RADIOLOGY  No orders to display     The radiologist's interpretation of all radiological studies have been reviewed by " me.    COURSE & MEDICAL DECISION MAKING  Nursing notes, VS, PMSFHx, labs, imaging, EKG reviewed in chart.    MDM: 12:22 AM Mary Gold is a 41 y.o. female who presented with generalized flulike symptoms of headache, congestion, body aches, etc. for the past 24 hours.  No known flu or COVID exposures with the patient is unsure.  Vital signs are within normal limits upon presentation.  Her physical exam is benign.  She has no significant contributory past medical history.  She was treated symptomatically with Flexeril, Tylenol and Motrin in the ED.  She is asking for a flu and COVID test which was ordered.  Flu and COVID were negative.  Patient feels significantly better after treatment in the ED and she is asking for discharge.  I still think this is likely a viral illness considering the constellation of symptoms she presents with.  I will prescribe Tylenol, Motrin and Flexeril to help with symptomatic relief.  I want her to follow-up for second flu test just make sure that she is truly negative considering her symptoms are very consistent with influenza A.  She is ambulatory, tolerating oral intake, in no acute distress and verbalizing understanding strict return precautions and outpatient follow-up plan.    FINAL IMPRESSION  1. Flu-like symptoms Acute   2. Body aches Acute      The note accurately reflects work and decisions made by me.  Antwan Avila  6/14/2022  12:22 AM

## 2022-06-14 NOTE — ED TRIAGE NOTES
"Chief Complaint   Patient presents with   • Flu Like Symptoms     Pt c/o body aches, HA, and congestion started around 1830 last night     /83   Pulse 85   Temp 36.3 °C (97.3 °F) (Oral)   Resp 14   Ht 1.575 m (5' 2\")   Wt 88.2 kg (194 lb 7.1 oz)   LMP 05/27/2022 (Exact Date)   SpO2 98%   BMI 35.56 kg/m²     "

## 2022-06-14 NOTE — ED NOTES
Pt medicated as ordered.  Covid culture collected and sent to lab.  Pt resting quietly at this time.

## 2022-08-11 ENCOUNTER — APPOINTMENT (OUTPATIENT)
Dept: INTERNAL MEDICINE | Facility: OTHER | Age: 42
End: 2022-08-11
Payer: MEDICAID

## 2022-08-19 ENCOUNTER — OFFICE VISIT (OUTPATIENT)
Dept: URGENT CARE | Facility: CLINIC | Age: 42
End: 2022-08-19
Payer: MEDICAID

## 2022-08-19 VITALS
DIASTOLIC BLOOD PRESSURE: 82 MMHG | HEART RATE: 77 BPM | WEIGHT: 184 LBS | RESPIRATION RATE: 14 BRPM | SYSTOLIC BLOOD PRESSURE: 122 MMHG | BODY MASS INDEX: 33.86 KG/M2 | TEMPERATURE: 97 F | OXYGEN SATURATION: 93 % | HEIGHT: 62 IN

## 2022-08-19 DIAGNOSIS — B34.9 ACUTE VIRAL SYNDROME: ICD-10-CM

## 2022-08-19 DIAGNOSIS — M79.10 MYALGIA: ICD-10-CM

## 2022-08-19 LAB
EXTERNAL QUALITY CONTROL: NORMAL
FLUAV+FLUBV AG SPEC QL IA: NORMAL
INT CON NEG: NORMAL
INT CON POS: NORMAL
SARS-COV+SARS-COV-2 AG RESP QL IA.RAPID: NEGATIVE

## 2022-08-19 PROCEDURE — 87426 SARSCOV CORONAVIRUS AG IA: CPT | Performed by: STUDENT IN AN ORGANIZED HEALTH CARE EDUCATION/TRAINING PROGRAM

## 2022-08-19 PROCEDURE — 87804 INFLUENZA ASSAY W/OPTIC: CPT | Performed by: STUDENT IN AN ORGANIZED HEALTH CARE EDUCATION/TRAINING PROGRAM

## 2022-08-19 PROCEDURE — 99213 OFFICE O/P EST LOW 20 MIN: CPT | Mod: 25 | Performed by: STUDENT IN AN ORGANIZED HEALTH CARE EDUCATION/TRAINING PROGRAM

## 2022-08-19 RX ORDER — KETOROLAC TROMETHAMINE 30 MG/ML
30 INJECTION, SOLUTION INTRAMUSCULAR; INTRAVENOUS ONCE
Status: COMPLETED | OUTPATIENT
Start: 2022-08-19 | End: 2022-08-19

## 2022-08-19 RX ADMIN — KETOROLAC TROMETHAMINE 30 MG: 30 INJECTION, SOLUTION INTRAMUSCULAR; INTRAVENOUS at 19:19

## 2022-08-19 ASSESSMENT — FIBROSIS 4 INDEX: FIB4 SCORE: 1.55

## 2022-08-20 NOTE — PROGRESS NOTES
Subjective:   CHIEF COMPLAINT  Chief Complaint   Patient presents with    Body Aches     X 3 days, running nose with presenting blood, body aches, headaches, watering eyes, chills, headache       HPI  Mary Gold is a 41 y.o. female who presents with a chief complaint of headache, sinus congestion, fatigue and body aches x2 days.  She has been taking Aleve which provide some relief.  Says symptoms are constant without any aggravating factors.  No additional modifying factors.  Denies experiencing any cough, wheezing or shortness of breath.  She is vaccine against COVID x1 with J&J.  No boosters.  She is accompanied by her daughter who is also being evaluated for cold-like symptoms.    REVIEW OF SYSTEMS  General: no fever or chills  GI: no nausea or vomiting  See HPI for further details.    PAST MEDICAL HISTORY  Patient Active Problem List    Diagnosis Date Noted    Fatigue 05/28/2022    Hepatomegaly 05/28/2022    Type 2 diabetes mellitus with hyperglycemia, without long-term current use of insulin (HCC) 02/11/2022    Class 2 obesity due to excess calories without serious comorbidity with body mass index (BMI) of 37.0 to 37.9 in adult 02/11/2022    Obesity (BMI 30-39.9) 02/11/2022    Elevated liver enzymes 02/10/2022    Chiari malformation 08/17/2015       SURGICAL HISTORY   has a past surgical history that includes other; craniectomy (8/17/2015); cervical laminectomy posterior (8/17/2015); and gyn surgery.    ALLERGIES  No Known Allergies    CURRENT MEDICATIONS  Home Medications       Reviewed by Lane David D.O. (Physician) on 08/19/22 at 1900  Med List Status: <None>     Medication Last Dose Status   ketorolac (TORADOL) injection 30 mg  Active   metFORMIN (GLUCOPHAGE) 500 MG Tab Not Taking Active                    SOCIAL HISTORY  Social History     Tobacco Use    Smoking status: Never    Smokeless tobacco: Never   Vaping Use    Vaping Use: Unknown   Substance and Sexual Activity    Alcohol use: Not  "Currently    Drug use: No    Sexual activity: Not Currently       FAMILY HISTORY  Family History   Problem Relation Age of Onset    Diabetes Mother     Hypertension Mother     Diabetes Maternal Aunt           Objective:   PHYSICAL EXAM  VITAL SIGNS: /82 (BP Location: Left arm, Patient Position: Sitting, BP Cuff Size: Adult)   Pulse 77   Temp 36.1 °C (97 °F)   Resp 14   Ht 1.575 m (5' 2\")   Wt 83.5 kg (184 lb)   SpO2 93%   BMI 33.65 kg/m²     Gen: no acute distress, normal voice  Skin: dry, intact, moist mucosal membranes  ENT: No pharyngeal erythema or exits.  Uvula midline.  Lungs: CTAB w/ symmetric expansion  CV: RRR w/o murmurs or clicks  Psych: normal affect, normal judgement, alert, awake    POC COVID: Negative  POC influenza: Negative    Assessment/Plan:     1. Acute viral syndrome  POCT SARS-COV Antigen HERMANN Manual Result    POCT Influenza A/B      2. Myalgia  ketorolac (TORADOL) injection 30 mg      Signs and symptoms are consistent with a viral upper respiratory infection and should be self-limiting.  POC COVID and influenza were negative.  Patient requesting a shot of Toradol as this is helped in the past with neck pain/body aches, which is completely reasonable.  - Ordered Rx for Toradol 30 mg IM x1  - Continue symptomatic treatment Motrin Tylenol as needed  - Return to urgent care any new/worsening symptoms or further questions or concerns.  Patient understood everything discussed.  All questions were answered.      Differential diagnosis, natural history, supportive care, and indications for immediate follow-up discussed. All questions answered. Patient agrees with the plan of care.    Follow-up as needed if symptoms worsen or fail to improve to PCP, Urgent care or Emergency Room.    Please note that this dictation was created using voice recognition software. I have made a reasonable attempt to correct obvious errors, but I expect that there are errors of grammar and possibly content that I " did not discover before finalizing the note.

## 2022-09-12 ENCOUNTER — APPOINTMENT (OUTPATIENT)
Dept: INTERNAL MEDICINE | Facility: OTHER | Age: 42
End: 2022-09-12
Payer: MEDICAID

## 2022-10-10 ENCOUNTER — OFFICE VISIT (OUTPATIENT)
Dept: INTERNAL MEDICINE | Facility: OTHER | Age: 42
End: 2022-10-10
Payer: MEDICAID

## 2022-10-10 VITALS
OXYGEN SATURATION: 97 % | TEMPERATURE: 96.6 F | DIASTOLIC BLOOD PRESSURE: 73 MMHG | WEIGHT: 199 LBS | SYSTOLIC BLOOD PRESSURE: 110 MMHG | HEIGHT: 62 IN | BODY MASS INDEX: 36.62 KG/M2 | HEART RATE: 73 BPM

## 2022-10-10 DIAGNOSIS — G62.9 NEUROPATHY: ICD-10-CM

## 2022-10-10 DIAGNOSIS — Z86.69 HISTORY OF CHIARI MALFORMATION: ICD-10-CM

## 2022-10-10 DIAGNOSIS — B35.3 TINEA PEDIS OF BOTH FEET: ICD-10-CM

## 2022-10-10 DIAGNOSIS — R60.9 NON-PITTING EDEMA: ICD-10-CM

## 2022-10-10 DIAGNOSIS — R53.82 CHRONIC FATIGUE: ICD-10-CM

## 2022-10-10 LAB
HBA1C MFR BLD: 6.2 % (ref 0–5.6)
INT CON NEG: NEGATIVE
INT CON POS: POSITIVE

## 2022-10-10 PROCEDURE — 83036 HEMOGLOBIN GLYCOSYLATED A1C: CPT

## 2022-10-10 PROCEDURE — 99214 OFFICE O/P EST MOD 30 MIN: CPT | Mod: GC

## 2022-10-10 RX ORDER — GABAPENTIN 100 MG/1
100 CAPSULE ORAL NIGHTLY
Qty: 90 CAPSULE | Refills: 1 | Status: SHIPPED | OUTPATIENT
Start: 2022-10-10 | End: 2023-11-20

## 2022-10-10 RX ORDER — CLOTRIMAZOLE 1 %
1 CREAM (GRAM) TOPICAL 2 TIMES DAILY
Qty: 2 EACH | Refills: 1 | Status: SHIPPED | OUTPATIENT
Start: 2022-10-10 | End: 2023-11-20

## 2022-10-10 ASSESSMENT — FIBROSIS 4 INDEX: FIB4 SCORE: 1.55

## 2022-10-10 NOTE — PATIENT INSTRUCTIONS
Thank you for visiting today!  Please follow-up in 4 weeks so that we can discuss your fatigue   Please get lab work done at least 5 days before next visit.  Please follow up with neurology   Please follow up with the lymphedema clinic to have your compression stockings fitted.   Please try and eat healthy, get at least 30 minutes of cardiovascular exercise a day to help keep your health as best as it can be.    If you have any questions or concerns please feel free to contact us at [phone].  If you feel like you are experiencing a medical emergency please seek immediate medical attention at an urgent care or in the emergency department.
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
## 2022-10-10 NOTE — PROGRESS NOTES
Teaching Physician Attestation      Level of Participation    I have personally interviewed and examined the patient.  In addition, I discussed with the resident physician the patient's history, exam, assessment and plan in detail.  Topics listed in my addendum were the focus of the visit.  Healthcare maintenance was not addressed this visit unless listed as a topic in my addendum.  I agree with the plan as written along with the following additions/modifications:    New Res PCP    PMH: prediabetes, morbid obesity, chiari malformation s/p surgery    Peripheral Neuropathy, ? 2/2 chiari malformation  -only prediabetic, no sig hx alcohol, b12 nl 6/22 after onset of sx.  No clear inciting event, no meds.  No b sx, no lad (chaparone Dr. Capone in room), cbc 6/22 nl.  Tsh pending, but will refer to neuro for karina hanna, appreciate support.  Start gabapentin 100mg qhs for sx, cautioned sedation.  [place] touch test nl, no wounds on feet although has mild callus medial foot that will be watched closely, also tinea pedis (see below)    Non-pitting edema likely 2/2 lymphedema in setting of morbid obesity  -checking tsh, but likely due to body habitus.  Counseled on pathophys.  Compression stockings if she would like.    Fatigue  -not fully addressed, checking tsh.  Cbc and vit d nl 6/2022    Tinea pedis  -lotramin x 1 mo    Rtc 1 mo.

## 2022-11-19 ENCOUNTER — OFFICE VISIT (OUTPATIENT)
Dept: URGENT CARE | Facility: CLINIC | Age: 42
End: 2022-11-19
Payer: MEDICAID

## 2022-11-19 VITALS
BODY MASS INDEX: 36.38 KG/M2 | OXYGEN SATURATION: 97 % | HEART RATE: 96 BPM | WEIGHT: 197.7 LBS | SYSTOLIC BLOOD PRESSURE: 128 MMHG | DIASTOLIC BLOOD PRESSURE: 86 MMHG | TEMPERATURE: 97.7 F | HEIGHT: 62 IN | RESPIRATION RATE: 16 BRPM

## 2022-11-19 DIAGNOSIS — H10.9 CONJUNCTIVITIS OF LEFT EYE, UNSPECIFIED CONJUNCTIVITIS TYPE: ICD-10-CM

## 2022-11-19 PROCEDURE — 99213 OFFICE O/P EST LOW 20 MIN: CPT | Performed by: STUDENT IN AN ORGANIZED HEALTH CARE EDUCATION/TRAINING PROGRAM

## 2022-11-19 RX ORDER — POLYMYXIN B SULFATE AND TRIMETHOPRIM 1; 10000 MG/ML; [USP'U]/ML
1 SOLUTION OPHTHALMIC EVERY 4 HOURS
Qty: 10 ML | Refills: 0 | Status: SHIPPED | OUTPATIENT
Start: 2022-11-19 | End: 2023-11-20

## 2022-11-19 ASSESSMENT — ENCOUNTER SYMPTOMS
COUGH: 1
PALPITATIONS: 0
HEADACHES: 0
FATIGUE: 0
CONSTIPATION: 0
WHEEZING: 0
NAUSEA: 0
EYE REDNESS: 1
BLURRED VISION: 0
DIZZINESS: 0
VOMITING: 0
FEVER: 0
SORE THROAT: 0
DOUBLE VISION: 0
EYE PAIN: 0
PHOTOPHOBIA: 0
DIARRHEA: 0
ABDOMINAL PAIN: 0
CHILLS: 0
SHORTNESS OF BREATH: 0
EYE DISCHARGE: 1

## 2022-11-19 ASSESSMENT — FIBROSIS 4 INDEX: FIB4 SCORE: 1.55

## 2022-11-19 NOTE — LETTER
November 19, 2022    To Whom It May Concern:         This is confirmation that Mary Gold attended her scheduled appointment with Brenda Johnson P.A.-C. on 11/19/22. Please excuse work absences today through 11/22/2022 for medical reasons.  Mary can return to work on 11/23/2022 if symptoms have improved/resolved.  Symptoms have not improved/resolved it is recommended Mary return to urgent care or follow-up with primary care provider for re-evaluation.         If you have any questions please do not hesitate to call me at the phone number listed below.    Sincerely,          Brenda Johnson P.A.-C.  [phone]

## 2022-11-20 NOTE — PROGRESS NOTES
"Carlos Gold is a 41 y.o. female who presents with Pink Eye (Left x today)            Conjunctivitis  This is a new problem. The current episode started today. The problem has been unchanged. Associated symptoms include congestion and coughing. Pertinent negatives include no abdominal pain, chest pain, chills, fatigue, fever, headaches, nausea, sore throat or vomiting. She has tried nothing for the symptoms.     Review of Systems   Constitutional:  Negative for chills, fatigue, fever and malaise/fatigue.   HENT:  Positive for congestion. Negative for ear pain and sore throat.    Eyes:  Positive for discharge and redness. Negative for blurred vision, double vision, photophobia and pain.   Respiratory:  Positive for cough. Negative for shortness of breath and wheezing.    Cardiovascular:  Negative for chest pain and palpitations.   Gastrointestinal:  Negative for abdominal pain, constipation, diarrhea, nausea and vomiting.   Neurological:  Negative for dizziness and headaches.            Objective     /86   Pulse 96   Temp 36.5 °C (97.7 °F) (Temporal)   Resp 16   Ht 1.575 m (5' 2\")   Wt 89.7 kg (197 lb 11.2 oz)   SpO2 97%   BMI 36.16 kg/m²      Physical Exam  Vitals reviewed.   Constitutional:       Appearance: Normal appearance.   HENT:      Head: Normocephalic and atraumatic.   Eyes:      General:         Right eye: No discharge.         Left eye: Discharge present.     Extraocular Movements: Extraocular movements intact.      Conjunctiva/sclera:      Left eye: Left conjunctiva is injected.      Pupils: Pupils are equal, round, and reactive to light.   Cardiovascular:      Rate and Rhythm: Normal rate.   Pulmonary:      Effort: Pulmonary effort is normal.   Skin:     General: Skin is warm and dry.   Neurological:      General: No focal deficit present.      Mental Status: She is alert. Mental status is at baseline.                           Assessment & Plan        1. Conjunctivitis of " left eye, unspecified conjunctivitis type  - polymixin-trimethoprim (POLYTRIM) 72673-0.1 UNIT/ML-% Solution; Administer 1 Drop into both eyes every 4 hours.  Dispense: 10 mL; Refill: 0     Supportive care measures include OTC antihistamine, basic eye care, refrigerated drops.  Patient advised that bacterial conjunctivitis is highly contagious and spread through direct contact with secretions/contact with contaminated objects.  Reviewed preventative measures to stop spread of contagion with patient in clinic today.  Patient to stop her Polytrim ophthalmic drops 1 drop in both eyes every 4 hours and continue for 2 days after symptom resolution.  If symptoms do not improve or patient develops eye pain, change in vision, blurry vision, is advised patient go to ER for further evaluation and management.    Differential diagnoses, supportive care, and indications for immediate follow-up discussed with patient. Pathogenesis of diagnosis discussed including typical length and natural progression (See AVS).    Instructed to return to urgent care or nearest emergency department if symptoms fail to improve, for any change in condition, further concerns, or new concerning symptoms.    Patient states understanding and agrees with the plan of care and discharge instructions.

## 2022-11-22 ENCOUNTER — APPOINTMENT (OUTPATIENT)
Dept: INTERNAL MEDICINE | Facility: OTHER | Age: 42
End: 2022-11-22
Payer: MEDICAID

## 2022-11-26 ENCOUNTER — APPOINTMENT (OUTPATIENT)
Dept: RADIOLOGY | Facility: MEDICAL CENTER | Age: 42
End: 2022-11-26
Attending: EMERGENCY MEDICINE
Payer: MEDICAID

## 2022-11-26 ENCOUNTER — HOSPITAL ENCOUNTER (EMERGENCY)
Facility: MEDICAL CENTER | Age: 42
End: 2022-11-26
Attending: EMERGENCY MEDICINE
Payer: MEDICAID

## 2022-11-26 VITALS
TEMPERATURE: 97 F | HEART RATE: 70 BPM | RESPIRATION RATE: 16 BRPM | BODY MASS INDEX: 36.47 KG/M2 | SYSTOLIC BLOOD PRESSURE: 116 MMHG | DIASTOLIC BLOOD PRESSURE: 80 MMHG | HEIGHT: 62 IN | OXYGEN SATURATION: 99 % | WEIGHT: 198.19 LBS

## 2022-11-26 DIAGNOSIS — H11.002 PTERYGIUM OF LEFT EYE: ICD-10-CM

## 2022-11-26 DIAGNOSIS — R05.2 SUBACUTE COUGH: ICD-10-CM

## 2022-11-26 PROCEDURE — 0241U HCHG SARS-COV-2 COVID-19 NFCT DS RESP RNA 4 TRGT MIC: CPT

## 2022-11-26 PROCEDURE — C9803 HOPD COVID-19 SPEC COLLECT: HCPCS | Performed by: EMERGENCY MEDICINE

## 2022-11-26 PROCEDURE — 99283 EMERGENCY DEPT VISIT LOW MDM: CPT | Mod: 25

## 2022-11-26 PROCEDURE — 71045 X-RAY EXAM CHEST 1 VIEW: CPT

## 2022-11-26 RX ORDER — ALBUTEROL SULFATE 90 UG/1
2 AEROSOL, METERED RESPIRATORY (INHALATION) EVERY 6 HOURS PRN
Qty: 8.5 G | Refills: 0 | Status: SHIPPED | OUTPATIENT
Start: 2022-11-26 | End: 2023-11-20

## 2022-11-26 RX ORDER — BENZONATATE 100 MG/1
100 CAPSULE ORAL 3 TIMES DAILY PRN
Qty: 60 CAPSULE | Refills: 0 | Status: SHIPPED | OUTPATIENT
Start: 2022-11-26 | End: 2023-11-20

## 2022-11-26 ASSESSMENT — FIBROSIS 4 INDEX: FIB4 SCORE: 1.55

## 2022-11-26 NOTE — ED PROVIDER NOTES
ED Provider Note    CHIEF COMPLAINT  No chief complaint on file.      HPI  Mary Gold is a 41 y.o. female who presents with cc of cough. Pt with a hx of chiari malformation and diabetes.  Patient reports that she had a cough for the last few weeks.  Cough is worse at night.  She denies any associated shortness of breath or chest pain.  Patient denies any associated lower extremity edema.  Patient reports associated runny nose and nasal congestion.  Patient denies any fevers or chills.  She also reports that she noticed some changes to the inside aspect of her left eye.  She denies any associated weight loss, or constitutional symptoms.    REVIEW OF SYSTEMS  ROS  See HPI for further details. All other systems are negative.     PAST MEDICAL HISTORY   has a past medical history of Chiari malformation, Diabetes (HCC), Dizziness, Fatigue, Heart burn, Indigestion, Numbness and tingling in hands, Other acute pain, and Unspecified hemorrhagic conditions.    SOCIAL HISTORY  Social History     Tobacco Use    Smoking status: Never    Smokeless tobacco: Never   Vaping Use    Vaping Use: Unknown   Substance and Sexual Activity    Alcohol use: Not Currently    Drug use: No    Sexual activity: Not Currently       SURGICAL HISTORY   has a past surgical history that includes other; craniectomy (8/17/2015); cervical laminectomy posterior (8/17/2015); and gyn surgery.    CURRENT MEDICATIONS  Home Medications    **Home medications have not yet been reviewed for this encounter**         ALLERGIES  No Known Allergies    PHYSICAL EXAM  There were no vitals filed for this visit.    Physical Exam  Constitutional:       Appearance: She is well-developed.   HENT:      Head: Normocephalic and atraumatic.   Eyes:      Comments: Pterygium present on left eye without any associated conjunctival injection, or scleral changes otherwise.  Full range of motion of extraocular muscles without any pain or diplopia evoked.   Cardiovascular:      Rate  and Rhythm: Normal rate and regular rhythm.      Comments: Normal PMI, no associated lower extremity edema  Pulmonary:      Effort: Pulmonary effort is normal.      Breath sounds: Normal breath sounds.   Abdominal:      General: Bowel sounds are normal. There is no distension.      Palpations: Abdomen is soft.      Tenderness: There is no abdominal tenderness. There is no rebound.   Musculoskeletal:      Cervical back: Normal range of motion and neck supple.   Skin:     General: Skin is warm and dry.      Findings: No rash.   Neurological:      Mental Status: She is alert and oriented to person, place, and time.   Psychiatric:         Behavior: Behavior normal.     DX-CHEST-PORTABLE (1 VIEW)   Final Result         1. No acute cardiopulmonary abnormalities are identified.            COURSE & MEDICAL DECISION MAKING  Pertinent Labs & Imaging studies reviewed. (See chart for details)    Very well-appearing patient here with subacute cough.  I believe is likely secondary to either seasonal allergies or possible viral etiology.  Patient otherwise appears very well.  Patient with any associated lower extremity edema, no associated shortness of breath on exertion, I believe heart failure is highly unlikely in this well-appearing patient.  She reports there is no chance she is pregnant.  Pterygium in her left eye does not appear complicated, there is no evidence of infection at this point.  The presentation does not appear consistent with episcleritis conjunctivitis or orbital cellulitis.  I have instructed patient to wear sunglasses and hats with his right outside, and to use artificial tears as needed for dry eyes.    FINAL IMPRESSION    1. Subacute cough    2. Pterygium of left eye            Electronically signed by: Brenton Damon M.D., 11/26/2022 3:54 PM

## 2022-11-27 NOTE — DISCHARGE INSTRUCTIONS
I have sent you home with some cough medicine.  [facility] if your RSV, COVID or influenza testing are positive.  You have had symptoms for greater than 5 days and therefore you would not qualify for treatment for any of these pathologies per CDC recommendations.  Your findings on your eye are most consistent with something called a pterygium which is very common, it is not dangerous, where hats and sunglasses when his brother side,  some artificial tears and use these twice daily.  Follow-up with your primary care physician.  Would also like you to  some Claritin.

## 2022-12-18 ENCOUNTER — OFFICE VISIT (OUTPATIENT)
Dept: URGENT CARE | Facility: CLINIC | Age: 42
End: 2022-12-18
Payer: MEDICAID

## 2022-12-18 VITALS
SYSTOLIC BLOOD PRESSURE: 124 MMHG | HEART RATE: 74 BPM | TEMPERATURE: 97.2 F | RESPIRATION RATE: 16 BRPM | WEIGHT: 199 LBS | BODY MASS INDEX: 36.62 KG/M2 | OXYGEN SATURATION: 97 % | HEIGHT: 62 IN | DIASTOLIC BLOOD PRESSURE: 70 MMHG

## 2022-12-18 DIAGNOSIS — J01.10 ACUTE NON-RECURRENT FRONTAL SINUSITIS: ICD-10-CM

## 2022-12-18 DIAGNOSIS — J20.9 ACUTE BRONCHITIS, UNSPECIFIED ORGANISM: ICD-10-CM

## 2022-12-18 PROCEDURE — 99213 OFFICE O/P EST LOW 20 MIN: CPT | Performed by: NURSE PRACTITIONER

## 2022-12-18 RX ORDER — AMOXICILLIN AND CLAVULANATE POTASSIUM 875; 125 MG/1; MG/1
1 TABLET, FILM COATED ORAL 2 TIMES DAILY
Qty: 20 TABLET | Refills: 0 | Status: SHIPPED | OUTPATIENT
Start: 2022-12-18 | End: 2022-12-28

## 2022-12-18 RX ORDER — ALBUTEROL SULFATE 90 UG/1
2 AEROSOL, METERED RESPIRATORY (INHALATION) EVERY 6 HOURS PRN
Qty: 8.5 G | Refills: 0 | Status: SHIPPED | OUTPATIENT
Start: 2022-12-18 | End: 2023-11-20

## 2022-12-18 RX ORDER — BENZONATATE 100 MG/1
100 CAPSULE ORAL 3 TIMES DAILY PRN
Qty: 30 CAPSULE | Refills: 0 | Status: SHIPPED | OUTPATIENT
Start: 2022-12-18 | End: 2022-12-28

## 2022-12-18 ASSESSMENT — FIBROSIS 4 INDEX: FIB4 SCORE: 1.55

## 2022-12-18 NOTE — LETTER
December 18, 2022    To Whom It May Concern:         This is confirmation that Mary Gold attended her scheduled appointment with ELIO Ellsworth on 12/18/22.         If you have any questions please do not hesitate to call me at the phone number listed below.    Sincerely,          RUDI Ellsworth.  [phone]

## 2022-12-19 NOTE — PROGRESS NOTES
Patient has consented to treatment and for use of patient information for treatment and billing purposes.    Date: 12/18/22     Arrival Mode: Private Vehicle / Ambulatory    Chief Complaint:    Chief Complaint   Patient presents with    Body Aches     Started last week    Headache     Started last week    Cough     Started last week        History of Present Illness: 41 y.o. female  presents to clinic with 8-day history of sinus congestion pain and pressure causing significant sinus headaches.  Patient points to the frontal sinuses.  Patient also did have some body aches, nausea and is now coughing as well.  Patient has been taking Tylenol for her symptoms.  Denies any severe shortness of breath chest pain or leg swelling.  No vomiting or diarrhea.    ROS:  As stated in HPI     Pertinent Medical History:    Past Medical History:   Diagnosis Date    Chiari malformation     Diabetes (HCC)     Dizziness     Fatigue     Heart burn     Indigestion     Numbness and tingling in hands     Other acute pain     headaches    Unspecified hemorrhagic conditions         Pertinent Surgical History:    Past Surgical History:   Procedure Laterality Date    CRANIECTOMY  8/17/2015    Procedure: SUBOCCIPITAL CRANIECTOMY ;  Surgeon: Tenzin Curtis M.D.;  Location: SURGERY [facility];  Service:     CERVICAL LAMINECTOMY POSTERIOR  8/17/2015    Procedure: CERVICAL LAMINECTOMY POSTERIOR C1, DUROPLASTY;  Surgeon: Tenzin Curtis M.D.;  Location: SURGERY [facility];  Service:     GYN SURGERY      OTHER      eye        Current  Medications:  Current Outpatient Medications on File Prior to Visit   Medication Sig Dispense Refill    albuterol 108 (90 Base) MCG/ACT Aero Soln inhalation aerosol Inhale 2 Puffs every 6 hours as needed (cough). (Patient not taking: Reported on 12/18/2022) 8.5 g 0    benzonatate (TESSALON) 100 MG Cap Take 1 Capsule by mouth 3 times a day as needed for Cough. (Patient not taking: Reported on 12/18/2022) 60  Capsule 0    polymixin-trimethoprim (POLYTRIM) 59722-8.1 UNIT/ML-% Solution Administer 1 Drop into both eyes every 4 hours. (Patient not taking: Reported on 12/18/2022) 10 mL 0    clotrimazole (LOTRIMIN) 1 % Cream Apply 1 Application topically 2 times a day. (Patient not taking: Reported on 12/18/2022) 2 Each 1    gabapentin (NEURONTIN) 100 MG Cap Take 1 Capsule by mouth every evening. (Patient not taking: Reported on 12/18/2022) 90 Capsule 1    Lancets (ONETOUCH DELICA PLUS [id number]) Misc USE AS DIRECTED TWICE DAILY TO TEST BLOOD GLUCOSE (Patient not taking: Reported on 12/18/2022)       No current facility-administered medications on file prior to visit.        Allergies:     Patient has no known allergies.     Social History:   Social History     Socioeconomic History    Marital status:      Spouse name: Not on file    Number of children: Not on file    Years of education: Not on file    Highest education level: Not on file   Occupational History    Not on file   Tobacco Use    Smoking status: Never    Smokeless tobacco: Never   Vaping Use    Vaping Use: Never used   Substance and Sexual Activity    Alcohol use: Not Currently    Drug use: No    Sexual activity: Not Currently   Other Topics Concern    Not on file   Social History Narrative    ** Merged History Encounter **          Social Determinants of Health     Financial Resource Strain: Not on file   Food Insecurity: Not on file   Transportation Needs: Not on file   Physical Activity: Not on file   Stress: Not on file   Social Connections: Not on file   Intimate Partner Violence: Not on file   Housing Stability: Not on file        No LMP recorded.       Physical Exam:  Vitals:    12/18/22 1848   BP: 124/70   Pulse: 74   Resp: 16   Temp: 36.2 °C (97.2 °F)   SpO2: 97%        Physical Exam  Vitals reviewed.   Constitutional:       General: She is not in acute distress.     Appearance: Normal appearance. She is well-developed. She is not toxic-appearing.    HENT:      Head: Normocephalic and atraumatic.      Right Ear: Tympanic membrane, ear canal and external ear normal.      Left Ear: Tympanic membrane, ear canal and external ear normal.      Nose: Congestion and rhinorrhea present.      Right Sinus: Frontal sinus tenderness present.      Left Sinus: Frontal sinus tenderness present.      Mouth/Throat:      Lips: Pink.      Mouth: Mucous membranes are moist.      Pharynx: Posterior oropharyngeal erythema (Postnasal drip noted) present.   Eyes:      General: Lids are normal. Gaze aligned appropriately. No allergic shiner or scleral icterus.     Extraocular Movements: Extraocular movements intact.      Conjunctiva/sclera: Conjunctivae normal.      Pupils: Pupils are equal, round, and reactive to light.   Cardiovascular:      Rate and Rhythm: Normal rate and regular rhythm.      Pulses:           Radial pulses are 2+ on the right side and 2+ on the left side.      Heart sounds: Normal heart sounds.   Pulmonary:      Effort: Pulmonary effort is normal.      Breath sounds: Normal breath sounds. No wheezing.   Musculoskeletal:      Right lower leg: No edema.      Left lower leg: No edema.   Lymphadenopathy:      Cervical: No cervical adenopathy.   Skin:     General: Skin is warm.      Capillary Refill: Capillary refill takes less than 2 seconds.      Coloration: Skin is not cyanotic or pale.      Findings: No rash.   Neurological:      Mental Status: She is alert.      Gait: Gait is intact.   Psychiatric:         Behavior: Behavior normal. Behavior is cooperative.        Diagnostics:    none      Medical Decision Making:  I personally reviewed prior external notes and test results pertinent to today's visit.   Shared decision-making was utilized with patient did develop treatment plan and clinic course. Jace, 41 y.o. female presenting to clinic on day 8 of sinus congestion pain and pressure causing daily headaches.  She is also had a significant cough as well.  She  is only taking Tylenol for her symptoms.  Discussed that exam findings as well as HPI are consistent with acute bacterial rhinosinusitis as well as bronchitis.  We will treat with Augmentin for bacterial sinusitis as well as send for albuterol inhaler as well as Tessalon Perles for bronchitis.    Did advise patient on conservative measures for management of symptoms.  Patient is agreeable to pursue adequate rest, adequate hydration, saltwater gargle and Neti pot for any symptoms of upper respiratory congestion.  Over-the-counter analgesia and antipyretics on a p.r.n. basis as needed for pain and fever.  Did discuss over-the-counter cough medications.      Patient will monitor symptoms closely for worsening and is advised to seek further evaluation the emergency room if alarm signs or symptoms arise.  Patient states understanding and verbalizes agreement with this plan of care.    Plan:    1. Acute bronchitis, unspecified organism    - albuterol 108 (90 Base) MCG/ACT Aero Soln inhalation aerosol; Inhale 2 Puffs every 6 hours as needed for Shortness of Breath.  Dispense: 8.5 g; Refill: 0  - benzonatate (TESSALON) 100 MG Cap; Take 1 Capsule by mouth 3 times a day as needed for Cough for up to 10 days.  Dispense: 30 Capsule; Refill: 0    2. Acute non-recurrent frontal sinusitis    - amoxicillin-clavulanate (AUGMENTIN) 875-125 MG Tab; Take 1 Tablet by mouth 2 times a day for 10 days.  Dispense: 20 Tablet; Refill: 0         Disposition:  Patient was discharged in stable condition.    Voice Recognition Disclaimer: Portions of this document were created using voice recognition software. The software does have a chance of producing errors of grammar and possibly content. I have made every reasonable attempt to correct obvious errors, but there may be errors of grammar and possibly content that I did not discover before finalizing the documentation.    Beryl Santiago, A.P.R.N.

## 2023-01-09 ENCOUNTER — APPOINTMENT (OUTPATIENT)
Dept: INTERNAL MEDICINE | Facility: OTHER | Age: 43
End: 2023-01-09
Payer: MEDICAID

## 2023-01-12 ENCOUNTER — OFFICE VISIT (OUTPATIENT)
Dept: MEDICAL GROUP | Facility: CLINIC | Age: 43
End: 2023-01-12
Payer: MEDICAID

## 2023-01-12 VITALS
HEART RATE: 76 BPM | DIASTOLIC BLOOD PRESSURE: 90 MMHG | OXYGEN SATURATION: 98 % | SYSTOLIC BLOOD PRESSURE: 118 MMHG | TEMPERATURE: 97 F

## 2023-01-12 DIAGNOSIS — E11.65 TYPE 2 DIABETES MELLITUS WITH HYPERGLYCEMIA, WITHOUT LONG-TERM CURRENT USE OF INSULIN (HCC): ICD-10-CM

## 2023-01-12 PROCEDURE — 99999 PR NO CHARGE: CPT | Performed by: FAMILY MEDICINE

## 2023-01-12 ASSESSMENT — PATIENT HEALTH QUESTIONNAIRE - PHQ9: CLINICAL INTERPRETATION OF PHQ2 SCORE: 0

## 2023-01-13 ENCOUNTER — APPOINTMENT (OUTPATIENT)
Dept: INTERNAL MEDICINE | Facility: OTHER | Age: 43
End: 2023-01-13
Payer: MEDICAID

## 2023-02-14 ENCOUNTER — HOSPITAL ENCOUNTER (EMERGENCY)
Facility: MEDICAL CENTER | Age: 43
End: 2023-02-15
Attending: EMERGENCY MEDICINE
Payer: COMMERCIAL

## 2023-02-14 ENCOUNTER — APPOINTMENT (OUTPATIENT)
Dept: RADIOLOGY | Facility: MEDICAL CENTER | Age: 43
End: 2023-02-14
Attending: EMERGENCY MEDICINE
Payer: COMMERCIAL

## 2023-02-14 VITALS
RESPIRATION RATE: 16 BRPM | BODY MASS INDEX: 35.62 KG/M2 | SYSTOLIC BLOOD PRESSURE: 98 MMHG | DIASTOLIC BLOOD PRESSURE: 62 MMHG | HEIGHT: 62 IN | WEIGHT: 193.56 LBS | TEMPERATURE: 97.8 F | OXYGEN SATURATION: 97 % | HEART RATE: 77 BPM

## 2023-02-14 DIAGNOSIS — R51.9 NONINTRACTABLE HEADACHE, UNSPECIFIED CHRONICITY PATTERN, UNSPECIFIED HEADACHE TYPE: ICD-10-CM

## 2023-02-14 PROCEDURE — 99283 EMERGENCY DEPT VISIT LOW MDM: CPT

## 2023-02-14 PROCEDURE — 70450 CT HEAD/BRAIN W/O DYE: CPT

## 2023-02-14 RX ORDER — ONDANSETRON 4 MG/1
4 TABLET, ORALLY DISINTEGRATING ORAL EVERY 8 HOURS PRN
Qty: 10 TABLET | Refills: 0 | Status: SHIPPED | OUTPATIENT
Start: 2023-02-14 | End: 2023-11-20

## 2023-02-14 ASSESSMENT — FIBROSIS 4 INDEX: FIB4 SCORE: 1.16

## 2023-02-15 NOTE — ED NOTES
"Continued Stay Note   [place]     Patient Name: Froilan Helton  MRN: [id number]  Today's Date: 1/19/2022    Admit Date: 1/14/2022     Discharge Plan     Row Name 01/19/22 1413       Plan    Plan Comments Call received from Polina with [facility] and she states that she was at the facility today and after speaking with the staff she now states since he won't participate with PT they can not accept because there would be no skill. CM asked if wound care was a skill and she states \"no\". Polina also states that their office ran his insurance and he has [employer] as primary and they would need a pre cert and if there were no PT notes they can not accept. CM assured Polina that patient has [facility] and [employer] is secondary. CM also asked if they were honoring the 3 midnight stay for admission and she states \"no he has to have a skill\". CM informed Polina that I would have the provider order a PT eval and that I would talk with the patient regarding such. Polina asked if she could call the patient to ask him if he would participate in PT while at the facility and CM provided her with patient's phone number in his room. CM also spoke with patient at bedside and he states that Polina did call into the room and he was getting his dressings changed and she was asked to call back. Patient also states he is agreeable to participate with PT at the facility. Provider Adriana Hood updated and order PT eval. CM will continue to follow for needs.               Discharge Codes    No documentation.                     Elizabeth Arguello RN    " POC again reviewed Still awaiting CT scan

## 2023-02-15 NOTE — ED NOTES
Discharge home with instructions, follow up care with a neurologist referred by ER physician, follow up with PCP, and work note given to patient with verbal understanding.    Ambulated out of the ED in stable condition.

## 2023-02-15 NOTE — ED PROVIDER NOTES
ER Provider Note    Scribed for Jeanmarie Luna M.d. by Brenton Montelongo. 2/14/2023  10:02 PM    Primary Care Provider: Rico Capone D.O.    CHIEF COMPLAINT  Chief Complaint   Patient presents with    Lightheadedness     Pt into ER with lightheadedness and headaches that started on Friday. Pt reports waking up with these symptoms and keeping her from going to work. Pt also reports bouts of nausea and body aches. Denies sick contact. Denies chest pain, shortness of breath, syncope.     Headache     EXTERNAL RECORDS REVIEWED  Other None pertinent to today's visit.    HPI/ROS  LIMITATION TO HISTORY   Select: : None  OUTSIDE HISTORIAN(S):  None    Mary Gold is a 42 y.o. female who presents to the ED complaining of ongoing headaches which began four days ago. She locates the pain to both of her temples.She notes that since Friday she has been experiencing headaches every morning. She reports she got up from bed and noticed her head was spinning, and she needed to call out of work due to these symptoms. She notes it took nearly 3 hours for her headache to resolve. She reports having similar problems 1 month ago when she went to [facility] for evaluation, which was inconclusive. She complains of head pain, and associated nausea. She denies any burning with urination, chest pain, shortness of breath, or syncope. She denies any current headache. She notes that she had surgery for a malformation in the back of her head, she notes pressure to that area.    She adds that she was pregnant 2 year ago and was diagnosed with gestational diabetes. She was diagnosed with COVID last year, when her sugars elevated to the 300's. She adds that she was taking Jardiance and has since been taken off the medications.     PAST MEDICAL HISTORY  Past Medical History:   Diagnosis Date    Chiari malformation     Diabetes (HCC)     Dizziness     Fatigue     Heart burn     Indigestion     Numbness and tingling in hands     Other acute pain      "headaches    Unspecified hemorrhagic conditions      SURGICAL HISTORY  Past Surgical History:   Procedure Laterality Date    CRANIECTOMY  8/17/2015    Procedure: SUBOCCIPITAL CRANIECTOMY ;  Surgeon: Tenzin Curtis M.D.;  Location: SURGERY [facility];  Service:     CERVICAL LAMINECTOMY POSTERIOR  8/17/2015    Procedure: CERVICAL LAMINECTOMY POSTERIOR C1, DUROPLASTY;  Surgeon: Tenzin Curtis M.D.;  Location: SURGERY [facility];  Service:     GYN SURGERY      OTHER      eye     FAMILY HISTORY  Family History   Problem Relation Age of Onset    Diabetes Mother     Hypertension Mother     Diabetes Maternal Aunt      SOCIAL HISTORY   reports that she has never smoked. She has never used smokeless tobacco. She reports that she does not currently use alcohol. She reports that she does not use drugs.    CURRENT MEDICATIONS  Previous Medications    ALBUTEROL 108 (90 BASE) MCG/ACT AERO SOLN INHALATION AEROSOL    Inhale 2 Puffs every 6 hours as needed (cough).    ALBUTEROL 108 (90 BASE) MCG/ACT AERO SOLN INHALATION AEROSOL    Inhale 2 Puffs every 6 hours as needed for Shortness of Breath.    BENZONATATE (TESSALON) 100 MG CAP    Take 1 Capsule by mouth 3 times a day as needed for Cough.    CLOTRIMAZOLE (LOTRIMIN) 1 % CREAM    Apply 1 Application topically 2 times a day.    GABAPENTIN (NEURONTIN) 100 MG CAP    Take 1 Capsule by mouth every evening.    LANCETS (ONETOUCH DELICA PLUS [id number]) MISC    USE AS DIRECTED TWICE DAILY TO TEST BLOOD GLUCOSE    POLYMIXIN-TRIMETHOPRIM (POLYTRIM) 15213-1.1 UNIT/ML-% SOLUTION    Administer 1 Drop into both eyes every 4 hours.     ALLERGIES  Patient has no known allergies.    PHYSICAL EXAM  /67   Pulse 85   Temp 36.2 °C (97.2 °F) (Temporal)   Resp 18   Ht 1.575 m (5' 2\")   Wt 87.8 kg (193 lb 9 oz)   LMP 02/07/2023   SpO2 95%   BMI 35.40 kg/m²   Constitutional: Well developed, Well nourished, No acute distress, Non-toxic appearance.   HENT: Normocephalic, Atraumatic, " mucous membranes moist, no erythema, exudates, swelling, or masses, nares patent  Eyes: nonicteric  Neck: Supple, no meningismus  Lymphatic: No lymphadenopathy noted.   Cardiovascular: Regular rate and rhythm, no gallops rubs or murmurs  Lungs: Clear bilaterally   Abdomen: Soft and nontender throughout  Skin: Warm, Dry, no rash  Genitalia: Deferred  Rectal: Deferred  Extremities: No edema  Neurologic: Alert, appropriate, follows commands, moving all extremities, normal speech No drift, no neglect, cranial nerves intact, speech normal, finger to nose intact, visual fields intact.  Psychiatric: Affect normal    DIAGNOSTIC STUDIES    Radiology:   The attending emergency physician has independently interpreted the diagnostic imaging associated with this visit and am waiting the final reading from the radiologist.   Radiologist interpretation:   CT-HEAD W/O   Final Result         1.  No acute intracranial abnormality.           COURSE & MEDICAL DECISION MAKING     ED Observation Status? Yes; I am placing the patient in to an observation status due to a diagnostic uncertainty as well as therapeutic intensity. Patient placed in observation status at 10:06 PM, 2/14/2023.     Observation plan is as follows: awaiting lab results, serial re-evaluation    Upon Reevaluation, the patient's condition has: Improved; and will be discharged.    Patient discharged from ED Observation status at 11:57 PM (Time) 2/14/2023 (Date).     INITIAL ASSESSMENT, COURSE AND PLAN  Care Narrative: This is a 42-year-old female who presents with headaches that seem to come in the morning and dissipate after anywhere from 45 minutes to several hours.  She was already evaluated for this and it appears this has been going on for couple of months.  Patient has no headache at this time.  Neurologic exam is reassuringly normal.  Head CT is unremarkable with no evidence of sinusitis hemorrhage mass lesion or other pathology noted.  Patient will be referred  to neurology headache clinic.  I did question the patient in regards to carbon monoxide detectors at home-she states she has one.  At this time I have a low suspicion for pseudotumor, subarachnoid hemorrhage, mass lesion, meningitis, sinusitis.    10:06 PM - Patient seen and examined at bedside. Discussed plan of care, including imaging. Patient agrees to the plan of care. Ordered for CT-head W/O to evaluate her symptoms.     DISPOSITION AND DISCUSSIONS  I have discussed management of the patient with the following physicians and DIGNA's:  None    Discussion of management with other QHP or appropriate source(s): None     FINAL DIAGNOSIS  1. Nonintractable headache, unspecified chronicity pattern, unspecified headache type         Brenton VASQUEZ (Bandar), am scribing for, and in the presence of, Jeanmarie Luna M.D..    Electronically signed by: Brenton Montelongo (Bandar), 2/14/2023    Jeanmarie VASQUEZ M.D. personally performed the services described in this documentation, as scribed by Brenton Montelongo in my presence, and it is both accurate and complete.    The note accurately reflects work and decisions made by me.  Jeanmarie Luna M.D.  2/14/2023  11:58 PM

## 2023-02-15 NOTE — ED TRIAGE NOTES
"Pt into ER with   Chief Complaint   Patient presents with    Lightheadedness     Pt into ER with lightheadedness and headaches that started on Friday. Pt reports waking up with these symptoms and keeping her from going to work. Pt also reports bouts of nausea and body aches. Denies sick contact. Denies chest pain, shortness of breath, syncope.     Headache     /67   Pulse 85   Temp 36.2 °C (97.2 °F) (Temporal)   Resp 18   Ht 1.575 m (5' 2\")   Wt 87.8 kg (193 lb 9 oz)   LMP 02/07/2023   SpO2 95%   BMI 35.40 kg/m²     "

## 2023-02-15 NOTE — ED NOTES
VS rechecked and stable GCS 15 AAO x 4 Pain mild and controlled  POC again reviewed Awaiting CT scan  Report off to Kristel CLEMENT

## 2023-02-15 NOTE — ED NOTES
Intermittent H/A x 3 wks Seen at [place] and had a negative CT   H/A returned on Fri Denies recent fever or trauma  Describes as bilat parietal pressure type h/a  Associated with lightheadedness Nausea no emesis  This am had numbness RT side of head that lasted 2 hrs  No ext weakness,numbness or tingling  No speech diff

## 2023-02-19 ENCOUNTER — OFFICE VISIT (OUTPATIENT)
Dept: URGENT CARE | Facility: CLINIC | Age: 43
End: 2023-02-19
Payer: COMMERCIAL

## 2023-02-19 VITALS
HEIGHT: 62 IN | RESPIRATION RATE: 16 BRPM | SYSTOLIC BLOOD PRESSURE: 116 MMHG | DIASTOLIC BLOOD PRESSURE: 72 MMHG | OXYGEN SATURATION: 98 % | BODY MASS INDEX: 36.44 KG/M2 | WEIGHT: 198 LBS | TEMPERATURE: 97.7 F | HEART RATE: 76 BPM

## 2023-02-19 DIAGNOSIS — J02.9 SORE THROAT: ICD-10-CM

## 2023-02-19 DIAGNOSIS — R53.83 OTHER FATIGUE: ICD-10-CM

## 2023-02-19 DIAGNOSIS — R19.7 DIARRHEA IN ADULT PATIENT: ICD-10-CM

## 2023-02-19 DIAGNOSIS — R05.8 DRY COUGH: ICD-10-CM

## 2023-02-19 DIAGNOSIS — B34.9 VIRAL ILLNESS: ICD-10-CM

## 2023-02-19 LAB
FLUAV RNA SPEC QL NAA+PROBE: NEGATIVE
FLUBV RNA SPEC QL NAA+PROBE: NEGATIVE
RSV RNA SPEC QL NAA+PROBE: NEGATIVE
SARS-COV-2 RNA RESP QL NAA+PROBE: NOT DETECTED

## 2023-02-19 PROCEDURE — 99214 OFFICE O/P EST MOD 30 MIN: CPT | Performed by: NURSE PRACTITIONER

## 2023-02-19 PROCEDURE — 0241U POCT CEPHEID COV-2, FLU A/B, RSV - PCR: CPT | Performed by: NURSE PRACTITIONER

## 2023-02-19 ASSESSMENT — FIBROSIS 4 INDEX: FIB4 SCORE: 1.16

## 2023-02-19 NOTE — PROGRESS NOTES
Subjective:   Mary Gold is a 42 y.o. female who presents for Pharyngitis, Cough, Diarrhea, and Coronavirus Screening       HPI  Patient presents for evaluation of 2 to 3-day history of sore throat, dry cough, and one episode of diarrhea.  Patient states she is associated fatigue.  She was sent home from work earlier this morning to be evaluated for COVID.  Denies any known ill contacts or exposures.  She has not taken anything yet for her symptoms.  Patient is documented COVID vaccinated, due for booster.    ROS  All other systems are negative except as documented above within HPI.    MEDS:   Current Outpatient Medications:     ondansetron (ZOFRAN ODT) 4 MG TABLET DISPERSIBLE, Take 1 Tablet by mouth every 8 hours as needed for Nausea/Vomiting. (Patient not taking: Reported on 2/19/2023), Disp: 10 Tablet, Rfl: 0    albuterol 108 (90 Base) MCG/ACT Aero Soln inhalation aerosol, Inhale 2 Puffs every 6 hours as needed for Shortness of Breath. (Patient not taking: Reported on 1/12/2023), Disp: 8.5 g, Rfl: 0    albuterol 108 (90 Base) MCG/ACT Aero Soln inhalation aerosol, Inhale 2 Puffs every 6 hours as needed (cough). (Patient not taking: Reported on 12/18/2022), Disp: 8.5 g, Rfl: 0    benzonatate (TESSALON) 100 MG Cap, Take 1 Capsule by mouth 3 times a day as needed for Cough. (Patient not taking: Reported on 12/18/2022), Disp: 60 Capsule, Rfl: 0    polymixin-trimethoprim (POLYTRIM) 46426-4.1 UNIT/ML-% Solution, Administer 1 Drop into both eyes every 4 hours. (Patient not taking: Reported on 12/18/2022), Disp: 10 mL, Rfl: 0    clotrimazole (LOTRIMIN) 1 % Cream, Apply 1 Application topically 2 times a day. (Patient not taking: Reported on 12/18/2022), Disp: 2 Each, Rfl: 1    gabapentin (NEURONTIN) 100 MG Cap, Take 1 Capsule by mouth every evening. (Patient not taking: Reported on 12/18/2022), Disp: 90 Capsule, Rfl: 1    Lancets (ONETOUCH DELICA PLUS [id number]) Misc, USE AS DIRECTED TWICE DAILY TO TEST BLOOD GLUCOSE  "(Patient not taking: Reported on 12/18/2022), Disp: , Rfl:   ALLERGIES: No Known Allergies    Patient's PMH, SocHx, SurgHx, FamHx, Drug allergies and medications were reviewed.     Objective:   /72 (BP Location: Right arm, Patient Position: Sitting, BP Cuff Size: Adult long)   Pulse 76   Temp 36.5 °C (97.7 °F) (Temporal)   Resp 16   Ht 1.575 m (5' 2\")   Wt 89.8 kg (198 lb)   LMP 02/07/2023   SpO2 98%   BMI 36.21 kg/m²     Physical Exam  Vitals and nursing note reviewed.   Constitutional:       General: She is awake.      Appearance: Normal appearance. She is well-developed.   HENT:      Head: Normocephalic and atraumatic.      Right Ear: Tympanic membrane, ear canal and external ear normal.      Left Ear: Tympanic membrane, ear canal and external ear normal.      Nose: Nose normal. No nasal tenderness, mucosal edema, congestion or rhinorrhea.      Right Turbinates: Enlarged.      Left Turbinates: Enlarged.      Mouth/Throat:      Lips: Pink.      Mouth: Mucous membranes are moist.      Pharynx: Oropharynx is clear. Uvula midline. Posterior oropharyngeal erythema present.   Eyes:      Extraocular Movements: Extraocular movements intact.      Conjunctiva/sclera: Conjunctivae normal.   Cardiovascular:      Rate and Rhythm: Normal rate and regular rhythm.      Pulses: Normal pulses.      Heart sounds: Normal heart sounds.   Pulmonary:      Effort: Pulmonary effort is normal.      Breath sounds: Normal breath sounds.      Comments: Dry cough  Musculoskeletal:         General: Normal range of motion.      Cervical back: Normal range of motion and neck supple.   Skin:     General: Skin is warm and dry.   Neurological:      General: No focal deficit present.      Mental Status: She is alert and oriented to person, place, and time.   Psychiatric:         Mood and Affect: Mood normal.         Behavior: Behavior normal. Behavior is cooperative.         Thought Content: Thought content normal.         Judgment: " Judgment normal.       Assessment/Plan:   Assessment    1. Viral illness    2. Dry cough  - POCT CoV-2, Flu A/B, RSV by PCR    3. Sore throat  - POCT CoV-2, Flu A/B, RSV by PCR    4. Diarrhea in adult patient  - POCT CoV-2, Flu A/B, RSV by PCR    5. Other fatigue  - POCT CoV-2, Flu A/B, RSV by PCR        Vital signs stable at today's acute urgent care visit.  Discussed test results completed in clinic.  Discussed with patient likely viral in nature and recommend over-the-counter support.    Advised the patient to follow-up with the primary care provider/urgent care if symptoms persist.  Red flags discussed and indications to immediately call 911 or present to the ED. All questions were encouraged and answered to the patient's satisfaction and understanding, and they agree to the plan of care.     This is an acute problem with uncertain prognosis, medication management and instructions as well as management options were provided.  I personally reviewed prior external notes and test results pertinent to today and independently reviewed and interpreted all diagnostics, to include POC testing. Time spent evaluating this patient includes preparing for visit, counseling/education, exam, evaluation, obtaining history, and ordering lab/test/procedures.      Please note that this dictation was created using voice recognition software. I have made a reasonable attempt to correct obvious errors, but I expect that there are errors of grammar and possibly content that I did not discover before finalizing the note.

## 2023-02-19 NOTE — LETTER
February 19, 2023    To Whom It May Concern:         This is confirmation that Mary Gold attended her scheduled appointment with ELIO Brennan on 2/19/23.  She tested negative for any contagious viral or bacterial illnesses at today's clinic.  Please allow her to return to work on the day of 2/20.         If you have any questions please do not hesitate to call me at the phone number listed below.    Sincerely,          RUDI Brennan.  [phone]

## 2023-08-30 ENCOUNTER — HOSPITAL ENCOUNTER (EMERGENCY)
Facility: MEDICAL CENTER | Age: 43
End: 2023-08-30
Attending: EMERGENCY MEDICINE
Payer: COMMERCIAL

## 2023-08-30 VITALS
RESPIRATION RATE: 18 BRPM | DIASTOLIC BLOOD PRESSURE: 75 MMHG | TEMPERATURE: 98.8 F | WEIGHT: 189.15 LBS | SYSTOLIC BLOOD PRESSURE: 128 MMHG | HEIGHT: 62 IN | OXYGEN SATURATION: 95 % | BODY MASS INDEX: 34.81 KG/M2 | HEART RATE: 63 BPM

## 2023-08-30 DIAGNOSIS — H65.192 OTHER NON-RECURRENT ACUTE NONSUPPURATIVE OTITIS MEDIA OF LEFT EAR: ICD-10-CM

## 2023-08-30 DIAGNOSIS — J06.9 VIRAL UPPER RESPIRATORY TRACT INFECTION: ICD-10-CM

## 2023-08-30 PROCEDURE — 0241U HCHG SARS-COV-2 COVID-19 NFCT DS RESP RNA 4 TRGT MIC: CPT

## 2023-08-30 PROCEDURE — 700102 HCHG RX REV CODE 250 W/ 637 OVERRIDE(OP): Performed by: EMERGENCY MEDICINE

## 2023-08-30 PROCEDURE — C9803 HOPD COVID-19 SPEC COLLECT: HCPCS | Performed by: EMERGENCY MEDICINE

## 2023-08-30 PROCEDURE — A9270 NON-COVERED ITEM OR SERVICE: HCPCS | Performed by: EMERGENCY MEDICINE

## 2023-08-30 PROCEDURE — 99283 EMERGENCY DEPT VISIT LOW MDM: CPT

## 2023-08-30 RX ORDER — IBUPROFEN 600 MG/1
600 TABLET ORAL EVERY 6 HOURS PRN
Qty: 30 TABLET | Refills: 0 | Status: SHIPPED | OUTPATIENT
Start: 2023-08-30 | End: 2023-11-20

## 2023-08-30 RX ORDER — IBUPROFEN 600 MG/1
600 TABLET ORAL ONCE
Status: COMPLETED | OUTPATIENT
Start: 2023-08-30 | End: 2023-08-30

## 2023-08-30 RX ORDER — AMOXICILLIN 250 MG/1
500 CAPSULE ORAL ONCE
Status: COMPLETED | OUTPATIENT
Start: 2023-08-30 | End: 2023-08-30

## 2023-08-30 RX ORDER — AMOXICILLIN 875 MG/1
875 TABLET, COATED ORAL 2 TIMES DAILY
Qty: 14 TABLET | Refills: 0 | Status: ACTIVE | OUTPATIENT
Start: 2023-08-30 | End: 2023-09-06

## 2023-08-30 RX ADMIN — AMOXICILLIN 500 MG: 250 CAPSULE ORAL at 22:00

## 2023-08-30 RX ADMIN — IBUPROFEN 600 MG: 600 TABLET, FILM COATED ORAL at 22:00

## 2023-08-30 NOTE — Clinical Note
Mary Gold was seen and treated in our emergency department on 8/30/2023.  She may return to work on 09/05/2023.       If you have any questions or concerns, please don't hesitate to call.      Jennifer Costa M.D.

## 2023-08-31 NOTE — DISCHARGE INSTRUCTIONS
Per CDC guidelines, recommend off work for the next 5 days, please mask diligently for the next 5 days after that.

## 2023-08-31 NOTE — ED PROVIDER NOTES
ED Provider Note    CHIEF COMPLAINT  Chief Complaint   Patient presents with    Cough    Ear Pain    Sore Throat     43 yo female ambulates to triage with reports of left ear pain, Sore throat and cough for the past 3 days.  Denies fever.  Patient reports mom has Covid and patient has been around her mom       EXTERNAL RECORDS REVIEWED  Outpatient Notes \reviewed office visit progress note dated February 19, 2023 by APRIL Barnett, patient seen for cough and diarrhea for coronavirus screening.  Vaccinated against COVID-19.  Negative viral panel    HPI/ROS  LIMITATION TO HISTORY   Select: : None  OUTSIDE HISTORIAN(S):  None available    Mary Gold is a 42 y.o. female who presents for evaluation of ear pain with cough.  Patient notes sore throat as well, she has been ill for the last 3 days.  Endorses nasal congestion but no productive cough.  Pain to the ear is localized to the left without drainage.  No known fever.  Endorses fatigue and generalized body ache, no vomiting nor diarrhea.  Exposed to COVID-19 with regard to her mother.  Patient is vaccinated against COVID-19.    PAST MEDICAL HISTORY   has a past medical history of Chiari malformation, Diabetes (HCC), Dizziness, Fatigue, Heart burn, Indigestion, Numbness and tingling in hands, Other acute pain, and Unspecified hemorrhagic conditions.    SURGICAL HISTORY   has a past surgical history that includes other; craniectomy (8/17/2015); cervical laminectomy posterior (8/17/2015); and gyn surgery.    FAMILY HISTORY  Family History   Problem Relation Age of Onset    Diabetes Mother     Hypertension Mother     Diabetes Maternal Aunt        SOCIAL HISTORY  Social History     Tobacco Use    Smoking status: Never    Smokeless tobacco: Never   Vaping Use    Vaping Use: Never used   Substance and Sexual Activity    Alcohol use: Not Currently    Drug use: No    Sexual activity: Not Currently       CURRENT MEDICATIONS  Home Medications       Reviewed by Michela  "DARBY Ni (Registered Nurse) on 08/30/23 at 2059  Med List Status: Not Addressed     Medication Last Dose Status   albuterol 108 (90 Base) MCG/ACT Aero Soln inhalation aerosol  Active   albuterol 108 (90 Base) MCG/ACT Aero Soln inhalation aerosol  Active   benzonatate (TESSALON) 100 MG Cap  Active   clotrimazole (LOTRIMIN) 1 % Cream  Active   gabapentin (NEURONTIN) 100 MG Cap  Active   Lancets (ONETOUCH DELICA PLUS [id number]) Misc  Active   ondansetron (ZOFRAN ODT) 4 MG TABLET DISPERSIBLE  Active   polymixin-trimethoprim (POLYTRIM) 60863-6.1 UNIT/ML-% Solution  Active                    ALLERGIES  No Known Allergies    PHYSICAL EXAM  VITAL SIGNS: /75   Pulse 63   Temp 37.1 °C (98.8 °F) (Temporal)   Resp 18   Ht 1.575 m (5' 2\")   Wt 85.8 kg (189 lb 2.5 oz)   LMP 08/28/2023 (Approximate)   SpO2 95%   BMI 34.60 kg/m²    General: Alert, no acute distress  Skin: Warm, dry, normal for ethnicity  Head: Normocephalic, atraumatic  Neck: Trachea midline, no tenderness  Eye: PERRL, normal conjunctiva  ENMT: Oral mucosa moist, no pharyngeal erythema or exudate.  Left TM is erythematous with loss of cone of light reflex consistent with otitis media.  Cardiovascular: Regular rate and rhythm, No murmur, Normal peripheral perfusion  Respiratory: Lungs CTA, respirations are non-labored, breath sounds are equal  Musculoskeletal: No swelling, no deformity  Neurological: Alert and oriented to person, place, time, and situation  Lymphatics: No lymphadenopathy  Psychiatric: Cooperative, appropriate mood & affect     DIAGNOSTIC STUDIES / PROCEDURES      LABS  Results for orders placed or performed during the hospital encounter of 08/30/23   CoV-2, FLU A/B, and RSV by PCR (2-4 Hours CEPHEID) : Collect NP swab in VTM    Specimen: Respirate   Result Value Ref Range    Influenza virus A RNA Negative Negative    Influenza virus B, PCR Negative Negative    RSV, PCR Negative Negative    SARS-CoV-2 by PCR NotDetected     SARS-CoV-2 " Source NP Swab             COURSE & MEDICAL DECISION MAKING    ED Observation Status? No; Patient does not meet criteria for ED Observation.  Patient medicated with amoxicillin for her ear infection, ibuprofen for pain.  Viral studies sent and pending.    INITIAL ASSESSMENT, COURSE AND PLAN  Care Narrative: This patient is a very pleasant but obviously uncomfortable 42-year-old presents for evaluation of fatigue and body aches with cough and sore throat and left ear pain.  On exam she has obvious otitis media, otherwise thankfully presentation is reassuring.  She lacks any tachycardia, she is afebrile, she has clear lungs on the exam without Rales or rhonchi and demonstrates no hypoxia nor tachypnea.  Suspect likely viral illness.  Given the otitis media there is clear indication for antibiotics.  She was exposed to COVID-19, she is actually COVID-19 negative at this point but is possible so the test was taken to early.  I recommend self-isolation at home for the next 5 days from when symptomatic per CDC recommendation and then diligent masking for the next 5 days after that.  Given not hypoxic no indication for dexamethasone.  She notes she is not a diabetic and denies a history of asthma, as such no indication for antivirals.  Recommend otherwise symptomatic management including ibuprofen for her body aches.  She has an over-the-counter cough medication as well.  HTN/IDDM FOLLOW UP:  The patient is referred to a primary physician for blood pressure management, diabetic screening, and for all other preventive health concerns      ADDITIONAL PROBLEM LIST  Viral syndrome, otitis media  DISPOSITION AND DISCUSSIONS  I have discussed management of the patient with the following physicians and DIGNA's:  NANoneNone     Escalation of care considered, and ultimately not performed:NA    Barriers to care at this time, including but not limited to:  NA .     Decision tools and prescription drugs considered including, but not  limited to:  NA .    The patient will return for new or worsening symptoms and is stable at the time of discharge.    Patient has had high blood pressure while in the emergency department, felt likely secondary to medical condition. Counseled patient to monitor blood pressure at home and follow up with primary care physician.      DISPOSITION:  Patient will be discharged home in stable condition.    FOLLOW UP:  Rico Capone D.O.  [address] [facility] [address]  [phone]    Schedule an appointment as soon as possible for a visit         OUTPATIENT MEDICATIONS:  Discharge Medication List as of 8/30/2023 10:45 PM        START taking these medications    Details   amoxicillin (AMOXIL) 875 MG tablet Take 1 Tablet by mouth 2 times a day for 7 days., Disp-14 Tablet, R-0, Normal      ibuprofen (MOTRIN) 600 MG Tab Take 1 Tablet by mouth every 6 hours as needed for Moderate Pain, Headache, Inflammation or Fever., Disp-30 Tablet, R-0, Normal                FINAL DIAGNOSIS  1. Viral upper respiratory tract infection    2. Other non-recurrent acute nonsuppurative otitis media of left ear           Electronically signed by: Jennifer Costa M.D., 8/30/2023 9:27 PM

## 2023-08-31 NOTE — ED TRIAGE NOTES
"Chief Complaint   Patient presents with    Cough    Ear Pain    Sore Throat     43 yo female ambulates to triage with reports of left ear pain, Sore throat and cough for the past 3 days.  Denies fever.  Patient reports mom has Covid and patient has been around her mom    /75   Pulse 63   Temp 37.1 °C (98.8 °F) (Temporal)   Resp 18   Ht 1.575 m (5' 2\")   Wt 85.8 kg (189 lb 2.5 oz)   LMP 08/28/2023 (Approximate)   SpO2 95%   BMI 34.60 kg/m²    "

## 2023-09-07 ENCOUNTER — HOSPITAL ENCOUNTER (EMERGENCY)
Facility: MEDICAL CENTER | Age: 43
End: 2023-09-07
Attending: EMERGENCY MEDICINE
Payer: COMMERCIAL

## 2023-09-07 VITALS
OXYGEN SATURATION: 95 % | BODY MASS INDEX: 35.13 KG/M2 | RESPIRATION RATE: 18 BRPM | SYSTOLIC BLOOD PRESSURE: 119 MMHG | WEIGHT: 190.92 LBS | DIASTOLIC BLOOD PRESSURE: 68 MMHG | TEMPERATURE: 97.1 F | HEART RATE: 65 BPM | HEIGHT: 62 IN

## 2023-09-07 DIAGNOSIS — J06.9 UPPER RESPIRATORY TRACT INFECTION, UNSPECIFIED TYPE: ICD-10-CM

## 2023-09-07 PROCEDURE — 700111 HCHG RX REV CODE 636 W/ 250 OVERRIDE (IP): Mod: JZ | Performed by: EMERGENCY MEDICINE

## 2023-09-07 PROCEDURE — 99283 EMERGENCY DEPT VISIT LOW MDM: CPT

## 2023-09-07 RX ORDER — AZITHROMYCIN 250 MG/1
TABLET, FILM COATED ORAL
Qty: 6 TABLET | Refills: 0 | Status: ACTIVE | OUTPATIENT
Start: 2023-09-07 | End: 2023-09-12

## 2023-09-07 RX ORDER — DEXAMETHASONE SODIUM PHOSPHATE 10 MG/ML
10 INJECTION, SOLUTION INTRAMUSCULAR; INTRAVENOUS ONCE
Status: COMPLETED | OUTPATIENT
Start: 2023-09-07 | End: 2023-09-07

## 2023-09-07 RX ADMIN — DEXAMETHASONE SODIUM PHOSPHATE 10 MG: 10 INJECTION, SOLUTION INTRAMUSCULAR; INTRAVENOUS at 16:14

## 2023-09-07 NOTE — ED TRIAGE NOTES
"Chief Complaint   Patient presents with    Ear Pain     Dx with L ear infection 8 days ago, completed course of amoxicillin. Pain now present in both ears.     Flu Like Symptoms     X7 days +weakness, cough, chills, diarrhea     /68   Pulse 65   Temp 36.2 °C (97.1 °F) (Temporal)   Resp 18   Ht 1.575 m (5' 2\")   Wt 86.6 kg (190 lb 14.7 oz)   LMP 08/28/2023 (Approximate)   SpO2 95%   BMI 34.92 kg/m²     Pt AO4, amb w steady gait.   Pt educated on triage process, and to notify ER RN if having any concerns while waiting for a room.     "

## 2023-09-07 NOTE — ED PROVIDER NOTES
ED Provider Note    CHIEF COMPLAINT  Chief Complaint   Patient presents with    Ear Pain     Dx with L ear infection 8 days ago, completed course of amoxicillin. Pain now present in both ears.     Flu Like Symptoms     X7 days +weakness, cough, chills, diarrhea       EXTERNAL RECORDS REVIEWED  Here 8/30/2023 for viral symptoms    HPI/ROS      Mary Gold is a 42 y.o. female who presents presented to the emergency department with ongoing ear pain and cough.  Patient was seen here for identical complaint 8/30/2023.  At that point patient had COVID testing which was negative, patient exam finding concerning for otitis media, patient was started on amoxicillin for this and ibuprofen.  Patient reports that in order to help with her ear pain she was putting clothes of garlic in her ears, however she lost 1 in side and is now concerned that it is stuck in there.  Patient denies any associated neck or back pain.  Of note patient's mother recently was diagnosed with COVID and hospitalized with hypoxia.    PAST MEDICAL HISTORY   has a past medical history of Chiari malformation, Diabetes (HCC), Dizziness, Fatigue, Heart burn, Indigestion, Numbness and tingling in hands, Other acute pain, and Unspecified hemorrhagic conditions.    SURGICAL HISTORY   has a past surgical history that includes other; craniectomy (8/17/2015); cervical laminectomy posterior (8/17/2015); and gyn surgery.    FAMILY HISTORY  Family History   Problem Relation Age of Onset    Diabetes Mother     Hypertension Mother     Diabetes Maternal Aunt        SOCIAL HISTORY  Social History     Tobacco Use    Smoking status: Never    Smokeless tobacco: Never   Vaping Use    Vaping Use: Never used   Substance and Sexual Activity    Alcohol use: Not Currently    Drug use: No    Sexual activity: Not Currently       CURRENT MEDICATIONS  Home Medications       Reviewed by Michela Guaman R.N. (Registered Nurse) on 09/07/23 at 1441  Med List Status: Not Addressed  "    Medication Last Dose Status   albuterol 108 (90 Base) MCG/ACT Aero Soln inhalation aerosol  Active   albuterol 108 (90 Base) MCG/ACT Aero Soln inhalation aerosol  Active   benzonatate (TESSALON) 100 MG Cap  Active   clotrimazole (LOTRIMIN) 1 % Cream  Active   gabapentin (NEURONTIN) 100 MG Cap  Active   ibuprofen (MOTRIN) 600 MG Tab  Active   Lancets (ONETOUCH DELICA PLUS [id number]) Misc  Active   ondansetron (ZOFRAN ODT) 4 MG TABLET DISPERSIBLE  Active   polymixin-trimethoprim (POLYTRIM) 66437-7.1 UNIT/ML-% Solution  Active                    ALLERGIES  No Known Allergies    PHYSICAL EXAM  VITAL SIGNS: /68   Pulse 65   Temp 36.2 °C (97.1 °F) (Temporal)   Resp 18   Ht 1.575 m (5' 2\")   Wt 86.6 kg (190 lb 14.7 oz)   LMP 08/28/2023 (Approximate)   SpO2 95%   BMI 34.92 kg/m²    Physical Exam  Constitutional:       Appearance: She is well-developed.   HENT:      Head: Normocephalic and atraumatic.      Comments: Left tympanic membrane is erythematous, without associated effusion, right tympanic membrane is unable to be fully visualized as there is a clove of garlic blocking visualization in the external canal.  Eyes:      Pupils: Pupils are equal, round, and reactive to light.   Cardiovascular:      Rate and Rhythm: Normal rate and regular rhythm.   Pulmonary:      Effort: Pulmonary effort is normal. No accessory muscle usage or respiratory distress.      Breath sounds: Normal breath sounds.   Abdominal:      General: Bowel sounds are normal.      Palpations: Abdomen is soft. There is no mass.      Tenderness: There is no abdominal tenderness.   Musculoskeletal:         General: Normal range of motion.   Skin:     General: Skin is warm.      Capillary Refill: Capillary refill takes less than 2 seconds.   Neurological:      General: No focal deficit present.      Mental Status: She is alert.   Psychiatric:         Mood and Affect: Mood normal. Mood is not anxious.           DIAGNOSTIC STUDIES / " PROCEDURES      LABS  Results for orders placed or performed during the hospital encounter of 08/30/23   CoV-2, FLU A/B, and RSV by PCR (2-4 Hours CEPHEID) : Collect NP swab in VTM    Specimen: Respirate   Result Value Ref Range    Influenza virus A RNA Negative Negative    Influenza virus B, PCR Negative Negative    RSV, PCR Negative Negative    SARS-CoV-2 by PCR NotDetected     SARS-CoV-2 Source NP Swab            COURSE & MEDICAL DECISION MAKING      INITIAL ASSESSMENT, COURSE AND PLAN  Care Narrative: Patient here with symptoms most likely viral illness.  She still has some mild erythema of her left ear, possibly resolving otitis.  Certainly mycoplasma is possible, patient's daughter has similar symptoms.  A viral illness otherwise is very possible.  COVID is very likely however patient did have a negative COVID PCR test.  Patient is outside the window for Paxlovid treatment and therefore will defer repeat testing at this point.  Patient lungs are entirely clear, she does not have any fever, she is not hypoxic, I have very low suspicion of lobar pneumonia and therefore x-rays deferred  Given the mycoplasma is possible and will cover with Z-Faisal.  Patient also given a dose of dexamethasone        DISPOSITION AND DISCUSSIONS    Escalation of care considered, and ultimately not performed: X-ray, further viral testing was deferred, see above for reasoning      FINAL DIAGNOSIS  1. Upper respiratory tract infection, unspecified type

## 2023-09-08 NOTE — ED NOTES
Pt given d/c paperwork; pt verbalized understanding all information given. Pt ambulated out of the ER w/o difficulty

## 2023-11-20 ENCOUNTER — HOSPITAL ENCOUNTER (EMERGENCY)
Facility: MEDICAL CENTER | Age: 43
End: 2023-11-20
Attending: STUDENT IN AN ORGANIZED HEALTH CARE EDUCATION/TRAINING PROGRAM
Payer: COMMERCIAL

## 2023-11-20 VITALS
RESPIRATION RATE: 18 BRPM | HEART RATE: 61 BPM | WEIGHT: 191.8 LBS | OXYGEN SATURATION: 97 % | TEMPERATURE: 98.1 F | DIASTOLIC BLOOD PRESSURE: 64 MMHG | SYSTOLIC BLOOD PRESSURE: 108 MMHG | BODY MASS INDEX: 35.3 KG/M2 | HEIGHT: 62 IN

## 2023-11-20 DIAGNOSIS — R19.7 DIARRHEA OF PRESUMED INFECTIOUS ORIGIN: ICD-10-CM

## 2023-11-20 LAB
ALBUMIN SERPL BCP-MCNC: 3.9 G/DL (ref 3.2–4.9)
ALBUMIN/GLOB SERPL: 1.1 G/DL
ALP SERPL-CCNC: 81 U/L (ref 30–99)
ALT SERPL-CCNC: 11 U/L (ref 2–50)
ANION GAP SERPL CALC-SCNC: 11 MMOL/L (ref 7–16)
AST SERPL-CCNC: 18 U/L (ref 12–45)
BASOPHILS # BLD AUTO: 0.5 % (ref 0–1.8)
BASOPHILS # BLD: 0.04 K/UL (ref 0–0.12)
BILIRUB SERPL-MCNC: <0.2 MG/DL (ref 0.1–1.5)
BUN SERPL-MCNC: 14 MG/DL (ref 8–22)
CALCIUM ALBUM COR SERPL-MCNC: 8.9 MG/DL (ref 8.5–10.5)
CALCIUM SERPL-MCNC: 8.8 MG/DL (ref 8.4–10.2)
CHLORIDE SERPL-SCNC: 102 MMOL/L (ref 96–112)
CO2 SERPL-SCNC: 22 MMOL/L (ref 20–33)
CREAT SERPL-MCNC: 0.56 MG/DL (ref 0.5–1.4)
EOSINOPHIL # BLD AUTO: 0.07 K/UL (ref 0–0.51)
EOSINOPHIL NFR BLD: 0.9 % (ref 0–6.9)
ERYTHROCYTE [DISTWIDTH] IN BLOOD BY AUTOMATED COUNT: 43.5 FL (ref 35.9–50)
FLUAV RNA SPEC QL NAA+PROBE: NEGATIVE
FLUBV RNA SPEC QL NAA+PROBE: NEGATIVE
GFR SERPLBLD CREATININE-BSD FMLA CKD-EPI: 117 ML/MIN/1.73 M 2
GLOBULIN SER CALC-MCNC: 3.4 G/DL (ref 1.9–3.5)
GLUCOSE SERPL-MCNC: 122 MG/DL (ref 65–99)
HCT VFR BLD AUTO: 43.1 % (ref 37–47)
HGB BLD-MCNC: 14 G/DL (ref 12–16)
IMM GRANULOCYTES # BLD AUTO: 0.02 K/UL (ref 0–0.11)
IMM GRANULOCYTES NFR BLD AUTO: 0.3 % (ref 0–0.9)
LIPASE SERPL-CCNC: 27 U/L (ref 11–82)
LYMPHOCYTES # BLD AUTO: 2.78 K/UL (ref 1–4.8)
LYMPHOCYTES NFR BLD: 35.7 % (ref 22–41)
MCH RBC QN AUTO: 27.7 PG (ref 27–33)
MCHC RBC AUTO-ENTMCNC: 32.5 G/DL (ref 32.2–35.5)
MCV RBC AUTO: 85.3 FL (ref 81.4–97.8)
MONOCYTES # BLD AUTO: 0.57 K/UL (ref 0–0.85)
MONOCYTES NFR BLD AUTO: 7.3 % (ref 0–13.4)
NEUTROPHILS # BLD AUTO: 4.3 K/UL (ref 1.82–7.42)
NEUTROPHILS NFR BLD: 55.3 % (ref 44–72)
NRBC # BLD AUTO: 0 K/UL
NRBC BLD-RTO: 0 /100 WBC (ref 0–0.2)
PLATELET # BLD AUTO: 219 K/UL (ref 164–446)
PMV BLD AUTO: 12.1 FL (ref 9–12.9)
POTASSIUM SERPL-SCNC: 3.8 MMOL/L (ref 3.6–5.5)
PROT SERPL-MCNC: 7.3 G/DL (ref 6–8.2)
RBC # BLD AUTO: 5.05 M/UL (ref 4.2–5.4)
RSV RNA SPEC QL NAA+PROBE: NEGATIVE
SARS-COV-2 RNA RESP QL NAA+PROBE: NOTDETECTED
SODIUM SERPL-SCNC: 135 MMOL/L (ref 135–145)
SPECIMEN SOURCE: NORMAL
WBC # BLD AUTO: 7.8 K/UL (ref 4.8–10.8)

## 2023-11-20 PROCEDURE — 36415 COLL VENOUS BLD VENIPUNCTURE: CPT

## 2023-11-20 PROCEDURE — 80053 COMPREHEN METABOLIC PANEL: CPT

## 2023-11-20 PROCEDURE — C9803 HOPD COVID-19 SPEC COLLECT: HCPCS | Performed by: STUDENT IN AN ORGANIZED HEALTH CARE EDUCATION/TRAINING PROGRAM

## 2023-11-20 PROCEDURE — 700111 HCHG RX REV CODE 636 W/ 250 OVERRIDE (IP): Performed by: STUDENT IN AN ORGANIZED HEALTH CARE EDUCATION/TRAINING PROGRAM

## 2023-11-20 PROCEDURE — 85025 COMPLETE CBC W/AUTO DIFF WBC: CPT

## 2023-11-20 PROCEDURE — 700102 HCHG RX REV CODE 250 W/ 637 OVERRIDE(OP): Performed by: STUDENT IN AN ORGANIZED HEALTH CARE EDUCATION/TRAINING PROGRAM

## 2023-11-20 PROCEDURE — 99283 EMERGENCY DEPT VISIT LOW MDM: CPT

## 2023-11-20 PROCEDURE — A9270 NON-COVERED ITEM OR SERVICE: HCPCS | Performed by: STUDENT IN AN ORGANIZED HEALTH CARE EDUCATION/TRAINING PROGRAM

## 2023-11-20 PROCEDURE — 0241U HCHG SARS-COV-2 COVID-19 NFCT DS RESP RNA 4 TRGT MIC: CPT

## 2023-11-20 PROCEDURE — C9803 HOPD COVID-19 SPEC COLLECT: HCPCS

## 2023-11-20 PROCEDURE — 83690 ASSAY OF LIPASE: CPT

## 2023-11-20 RX ORDER — DICYCLOMINE HYDROCHLORIDE 10 MG/1
10 CAPSULE ORAL
Qty: 120 CAPSULE | Refills: 0 | Status: SHIPPED | OUTPATIENT
Start: 2023-11-20 | End: 2024-02-23

## 2023-11-20 RX ORDER — ONDANSETRON 4 MG/1
4 TABLET, ORALLY DISINTEGRATING ORAL EVERY 6 HOURS PRN
Qty: 10 TABLET | Refills: 0 | Status: SHIPPED | OUTPATIENT
Start: 2023-11-20 | End: 2024-02-06

## 2023-11-20 RX ORDER — ONDANSETRON 4 MG/1
4 TABLET, ORALLY DISINTEGRATING ORAL ONCE
Status: COMPLETED | OUTPATIENT
Start: 2023-11-20 | End: 2023-11-20

## 2023-11-20 RX ADMIN — ONDANSETRON 4 MG: 4 TABLET, ORALLY DISINTEGRATING ORAL at 17:35

## 2023-11-20 RX ADMIN — LIDOCAINE HYDROCHLORIDE 30 ML: 20 SOLUTION OROPHARYNGEAL at 17:34

## 2023-11-20 NOTE — Clinical Note
Mary Gold was seen and treated in our emergency department on 11/20/2023.  She may return to work on 11/24/2023.       If you have any questions or concerns, please don't hesitate to call.      Rupa Fang M.D.

## 2023-11-20 NOTE — ED TRIAGE NOTES
"42 yr old female to triage  Chief Complaint   Patient presents with    Diarrhea    Nausea    Headache    Generalized Body Aches     Patient report of diarrhea, nausea, headache, and generalized body aches with fatigue for he past 3 days.  Patient report she felt hot.       /81   Pulse 70   Temp 36.4 °C (97.5 °F) (Oral)   Resp 18   Ht 1.575 m (5' 2\")   Wt 87 kg (191 lb 12.8 oz)   LMP  (LMP Unknown) Comment: last week of October  SpO2 97%   BMI 35.08 kg/m²     "

## 2023-11-21 NOTE — ED PROVIDER NOTES
ED Provider Note    CHIEF COMPLAINT  Chief Complaint   Patient presents with    Diarrhea    Nausea    Headache    Generalized Body Aches     Patient report of diarrhea, nausea, headache, and generalized body aches with fatigue for he past 3 days.  Patient report she felt hot.         EXTERNAL RECORDS REVIEWED  Other Outpatient notes.  She has been seen at urgent care for viral illness with cough. Seen at [facility] [place] 2[place] for foreign body in ear.    HPI/ROS  LIMITATION TO HISTORY   Select: : None  OUTSIDE HISTORIAN(S):  None    Mary Gold is a 42 y.o. female who presents with diarrhea.  She says that symptoms started about 3 days ago.  She endorses one episode of loose watery diarrhea a day since then.  She denies any black or bloody stool. She has associated nausea but no vomiting.  Yesterday she felt very hot like she had a fever but did not measure her temperature.  She denies any blood in her stool.  She denies any recent antibiotic use.  No travel outside the country.  She denies any known sick contacts.  No unusual food exposures.  She does have some associated, mild cramping abdominal pain. She is also having a burning sensation in her epigastric region.  She does have congestion but denies any other URI symptoms.  She also feels fatigued and generally achy.     PAST MEDICAL HISTORY   has a past medical history of Chiari malformation, Diabetes (HCC), Dizziness, Fatigue, Heart burn, Indigestion, Numbness and tingling in hands, Other acute pain, and Unspecified hemorrhagic conditions.    SURGICAL HISTORY   has a past surgical history that includes other; craniectomy (8/17/2015); cervical laminectomy posterior (8/17/2015); and gyn surgery.    FAMILY HISTORY  Family History   Problem Relation Age of Onset    Diabetes Mother     Hypertension Mother     Diabetes Maternal Aunt        SOCIAL HISTORY  Social History     Tobacco Use    Smoking status: Never    Smokeless tobacco: Never   Vaping Use    Vaping Use: Never used  "  Substance and Sexual Activity    Alcohol use: Not Currently    Drug use: No    Sexual activity: Not Currently       CURRENT MEDICATIONS  Home Medications       Reviewed by Maribell Purvis R.N. (Registered Nurse) on 23 at 1513  Med List Status: Complete     Medication Last Dose Status        Patient Lc Taking any Medications                           ALLERGIES  No Known Allergies    PHYSICAL EXAM  VITAL SIGNS: /64   Pulse 61   Temp 36.7 °C (98.1 °F) (Temporal)   Resp 18   Ht 1.575 m (5' 2\")   Wt 87 kg (191 lb 12.8 oz)   LMP  (LMP Unknown) Comment: last week of October  SpO2 97%   BMI 35.08 kg/m²    Constitutional: Awake and alert . Non toxic  HENT: Normal inspection  . Moist mucous membranes  Eyes: Normal inspection  Neck: Grossly normal range of motion.  Cardiovascular: Normal heart rate, Normal rhythm.  Symmetric peripheral pulses.   Thorax & Lungs: No respiratory distress, No wheezing, No rales, No rhonchi  Abdomen: Soft, non-distended, nontender to palpation in all 4 quadrants, no mass  Skin: No obvious rash.  Extremities: Warm, well perfused. No clubbing, cyanosis, edema   Neurologic: Grossly normal   Psychiatric: Normal for situation      DIAGNOSTIC STUDIES / PROCEDURES  EKG  I have independently interpreted this EKG  Results for orders placed or performed during the hospital encounter of 21   EKG   Result Value Ref Range    Report       [facility] Emergency Dept.    Test Date:  2021  Pt Name:    FAUSTINO OROPEZA               Department: ER  MRN:        [id number]                      Room:       [facility]  Gender:     Female                       Technician: [id number]  :        1980                   Requested By:URSULA MCINTOSH II  Order #:    [id number]                    Reading MD: Ursula Mcintosh II, MD    Measurements  Intervals                                Axis  Rate:       67                           P:          39  LA:         140            "               QRS:        39  QRSD:       88                           T:          30  QT:         444  QTc:        469    Interpretive Statements  SINUS RHYTHM  Normal rate  Normal intervals  No ST elevation or depression. No twave changes.  Impression: Normal sinus rhythm EKG.  Compared to ECG 09/08/2015 19:21:00  T-wave abnormality no longer present  Electronically Signed On 8-7-2021 5:04:28 PDT by Austen Mcintosh II, MD           LABS  Results for orders placed or performed during the hospital encounter of 11/20/23   CoV-2, Flu A/B, And RSV by PCR ([place])    Specimen: Nasal; Respirate   Result Value Ref Range    Influenza virus A RNA Negative Negative    Influenza virus B, PCR Negative Negative    RSV, PCR Negative Negative    SARS-CoV-2 by PCR NotDetected     SARS-CoV-2 Source NP Swab    CBC WITH DIFFERENTIAL   Result Value Ref Range    WBC 7.8 4.8 - 10.8 K/uL    RBC 5.05 4.20 - 5.40 M/uL    Hemoglobin 14.0 12.0 - 16.0 g/dL    Hematocrit 43.1 37.0 - 47.0 %    MCV 85.3 81.4 - 97.8 fL    MCH 27.7 27.0 - 33.0 pg    MCHC 32.5 32.2 - 35.5 g/dL    RDW 43.5 35.9 - 50.0 fL    Platelet Count 219 164 - 446 K/uL    MPV 12.1 9.0 - 12.9 fL    Neutrophils-Polys 55.30 44.00 - 72.00 %    Lymphocytes 35.70 22.00 - 41.00 %    Monocytes 7.30 0.00 - 13.40 %    Eosinophils 0.90 0.00 - 6.90 %    Basophils 0.50 0.00 - 1.80 %    Immature Granulocytes 0.30 0.00 - 0.90 %    Nucleated RBC 0.00 0.00 - 0.20 /100 WBC    Neutrophils (Absolute) 4.30 1.82 - 7.42 K/uL    Lymphs (Absolute) 2.78 1.00 - 4.80 K/uL    Monos (Absolute) 0.57 0.00 - 0.85 K/uL    Eos (Absolute) 0.07 0.00 - 0.51 K/uL    Baso (Absolute) 0.04 0.00 - 0.12 K/uL    Immature Granulocytes (abs) 0.02 0.00 - 0.11 K/uL    NRBC (Absolute) 0.00 K/uL   CMP   Result Value Ref Range    Sodium 135 135 - 145 mmol/L    Potassium 3.8 3.6 - 5.5 mmol/L    Chloride 102 96 - 112 mmol/L    Co2 22 20 - 33 mmol/L    Anion Gap 11.0 7.0 - 16.0    Glucose 122 (H) 65 - 99 mg/dL    Bun 14 8 - 22  mg/dL    Creatinine 0.56 0.50 - 1.40 mg/dL    Calcium 8.8 8.4 - 10.2 mg/dL    Correct Calcium 8.9 8.5 - 10.5 mg/dL    AST(SGOT) 18 12 - 45 U/L    ALT(SGPT) 11 2 - 50 U/L    Alkaline Phosphatase 81 30 - 99 U/L    Total Bilirubin <0.2 0.1 - 1.5 mg/dL    Albumin 3.9 3.2 - 4.9 g/dL    Total Protein 7.3 6.0 - 8.2 g/dL    Globulin 3.4 1.9 - 3.5 g/dL    A-G Ratio 1.1 g/dL   LIPASE   Result Value Ref Range    Lipase 27 11 - 82 U/L   ESTIMATED GFR   Result Value Ref Range    GFR (CKD-EPI) 117 >60 mL/min/1.73 m 2       COURSE & MEDICAL DECISION MAKING    ED Observation Status? No; Patient does not meet criteria for ED Observation.     INITIAL ASSESSMENT, COURSE AND PLAN  Care Narrative: This is a 42-year-old with no chronic medical problems who presents with diarrhea, intermittent abdominal pain, nausea and subjective fevers.  On arrival she has normal vital signs is afebrile and systemically well-appearing.  She does not appear to be systemically ill or dehydrated.  She has benign abdominal exam without focal tenderness, or signs of peritonitis.  She has no recent antibiotic use or known risk factors for C. difficile.  I considered but think unlikely, partial SBO, appendicitis, diffuse reticulitis or other intra-abdominal infection given benign exam.  I do not see an indication to obtain advanced imaging.  Labs are reassuring she has no significant electrolyte derangements.  She is able to tolerate p.o. and no indication for IV fluids.  She has no fever or bloody stools to suggest invasive bacterial etiology.  She continues to look well, with normal vital signs.  Repeat exam is benign.  I think she can be safely discharged and followed up as an outpatient.  She understands to return to the ER if worsening.    DISPOSITION AND DISCUSSIONS  I have discussed management of the patient with the following physicians and DIGNA's:  None    Discussion of management with other QHP or appropriate source(s): None     Escalation of care  considered, and ultimately not performed:diagnostic imaging, low suspicion for acute inflammatory or surgical process    Barriers to care at this time, including but not limited to:  None .     Decision tools and prescription drugs considered including, but not limited to: Antibiotics Not indicated more likely viral syndrome .    FINAL DIAGNOSIS  1. Diarrhea of presumed infectious origin Acute          Electronically signed by: Rupa Fang M.D., 11/20/2023 5:05 PM

## 2024-01-04 ENCOUNTER — OFFICE VISIT (OUTPATIENT)
Dept: URGENT CARE | Facility: CLINIC | Age: 44
End: 2024-01-04
Payer: COMMERCIAL

## 2024-01-04 ENCOUNTER — APPOINTMENT (OUTPATIENT)
Dept: RADIOLOGY | Facility: IMAGING CENTER | Age: 44
End: 2024-01-04
Attending: PHYSICIAN ASSISTANT
Payer: COMMERCIAL

## 2024-01-04 VITALS
HEIGHT: 62 IN | BODY MASS INDEX: 34.89 KG/M2 | OXYGEN SATURATION: 95 % | WEIGHT: 189.6 LBS | RESPIRATION RATE: 14 BRPM | DIASTOLIC BLOOD PRESSURE: 80 MMHG | TEMPERATURE: 97.8 F | HEART RATE: 88 BPM | SYSTOLIC BLOOD PRESSURE: 130 MMHG

## 2024-01-04 DIAGNOSIS — J10.1 INFLUENZA A: ICD-10-CM

## 2024-01-04 DIAGNOSIS — M54.50 LUMBAR BACK PAIN: ICD-10-CM

## 2024-01-04 DIAGNOSIS — R68.89 FLU-LIKE SYMPTOMS: ICD-10-CM

## 2024-01-04 LAB
FLUAV RNA SPEC QL NAA+PROBE: POSITIVE
FLUBV RNA SPEC QL NAA+PROBE: NEGATIVE
RSV RNA SPEC QL NAA+PROBE: NEGATIVE
S PYO DNA SPEC NAA+PROBE: NOT DETECTED
SARS-COV-2 RNA RESP QL NAA+PROBE: NEGATIVE

## 2024-01-04 PROCEDURE — 0241U POCT CEPHEID COV-2, FLU A/B, RSV - PCR: CPT | Performed by: PHYSICIAN ASSISTANT

## 2024-01-04 PROCEDURE — 3075F SYST BP GE 130 - 139MM HG: CPT | Performed by: PHYSICIAN ASSISTANT

## 2024-01-04 PROCEDURE — 72100 X-RAY EXAM L-S SPINE 2/3 VWS: CPT | Mod: TC,FY | Performed by: PHYSICIAN ASSISTANT

## 2024-01-04 PROCEDURE — 99214 OFFICE O/P EST MOD 30 MIN: CPT | Performed by: PHYSICIAN ASSISTANT

## 2024-01-04 PROCEDURE — 87651 STREP A DNA AMP PROBE: CPT | Performed by: PHYSICIAN ASSISTANT

## 2024-01-04 PROCEDURE — 3079F DIAST BP 80-89 MM HG: CPT | Performed by: PHYSICIAN ASSISTANT

## 2024-01-04 RX ORDER — OSELTAMIVIR PHOSPHATE 75 MG/1
75 CAPSULE ORAL 2 TIMES DAILY
Qty: 10 CAPSULE | Refills: 0 | Status: SHIPPED | OUTPATIENT
Start: 2024-01-04 | End: 2024-02-06

## 2024-01-04 ASSESSMENT — ENCOUNTER SYMPTOMS
PARESIS: 0
HEADACHES: 0
ABDOMINAL PAIN: 0
MYALGIAS: 1
LEG PAIN: 0
WEAKNESS: 0
NUMBNESS: 0
PARESTHESIAS: 1
BACK PAIN: 1
WEIGHT LOSS: 0
FEVER: 0
PERIANAL NUMBNESS: 0
BOWEL INCONTINENCE: 0
TINGLING: 0

## 2024-01-04 ASSESSMENT — FIBROSIS 4 INDEX: FIB4 SCORE: 1.04

## 2024-01-04 NOTE — LETTER
January 4, 2024         Patient: Mary Gold   YOB: 1980   Date of Visit: 1/4/2024           To Whom it May Concern:    Mary Gold was seen in my clinic on 1/4/2024.  Please excuse patient's absence yesterday.    If you have any questions or concerns, please don't hesitate to call.        Sincerely,           Inder Mcneil P.A.-C.  Electronically Signed
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
## 2024-02-05 ENCOUNTER — HOSPITAL ENCOUNTER (EMERGENCY)
Facility: MEDICAL CENTER | Age: 44
End: 2024-02-06
Attending: EMERGENCY MEDICINE
Payer: COMMERCIAL

## 2024-02-05 DIAGNOSIS — R20.2 PARESTHESIAS: ICD-10-CM

## 2024-02-05 DIAGNOSIS — R51.9 ACUTE NONINTRACTABLE HEADACHE, UNSPECIFIED HEADACHE TYPE: ICD-10-CM

## 2024-02-05 PROCEDURE — 99283 EMERGENCY DEPT VISIT LOW MDM: CPT

## 2024-02-05 ASSESSMENT — FIBROSIS 4 INDEX: FIB4 SCORE: 1.04

## 2024-02-05 NOTE — Clinical Note
Mary Gold was seen and treated in our emergency department on 2/5/2024.  She may return to work on 02/06/2024.       If you have any questions or concerns, please don't hesitate to call.      Kevan Salcedo M.D.

## 2024-02-06 ENCOUNTER — APPOINTMENT (OUTPATIENT)
Dept: RADIOLOGY | Facility: MEDICAL CENTER | Age: 44
End: 2024-02-06
Attending: EMERGENCY MEDICINE
Payer: COMMERCIAL

## 2024-02-06 ENCOUNTER — HOSPITAL ENCOUNTER (EMERGENCY)
Facility: MEDICAL CENTER | Age: 44
End: 2024-02-06
Attending: EMERGENCY MEDICINE
Payer: COMMERCIAL

## 2024-02-06 ENCOUNTER — OFFICE VISIT (OUTPATIENT)
Dept: URGENT CARE | Facility: CLINIC | Age: 44
End: 2024-02-06
Payer: COMMERCIAL

## 2024-02-06 VITALS
OXYGEN SATURATION: 96 % | TEMPERATURE: 97.9 F | HEART RATE: 74 BPM | SYSTOLIC BLOOD PRESSURE: 102 MMHG | BODY MASS INDEX: 35.7 KG/M2 | WEIGHT: 194 LBS | DIASTOLIC BLOOD PRESSURE: 68 MMHG | HEIGHT: 62 IN | RESPIRATION RATE: 16 BRPM

## 2024-02-06 VITALS
TEMPERATURE: 98.5 F | OXYGEN SATURATION: 97 % | DIASTOLIC BLOOD PRESSURE: 65 MMHG | WEIGHT: 192.46 LBS | HEIGHT: 62 IN | SYSTOLIC BLOOD PRESSURE: 113 MMHG | HEART RATE: 66 BPM | BODY MASS INDEX: 35.42 KG/M2 | RESPIRATION RATE: 16 BRPM

## 2024-02-06 VITALS
OXYGEN SATURATION: 98 % | DIASTOLIC BLOOD PRESSURE: 76 MMHG | HEART RATE: 74 BPM | BODY MASS INDEX: 35.7 KG/M2 | TEMPERATURE: 98.4 F | RESPIRATION RATE: 18 BRPM | SYSTOLIC BLOOD PRESSURE: 109 MMHG | WEIGHT: 194 LBS | HEIGHT: 62 IN

## 2024-02-06 DIAGNOSIS — M62.838 CERVICAL PARASPINAL MUSCLE SPASM: ICD-10-CM

## 2024-02-06 DIAGNOSIS — R51.9 NONINTRACTABLE HEADACHE, UNSPECIFIED CHRONICITY PATTERN, UNSPECIFIED HEADACHE TYPE: ICD-10-CM

## 2024-02-06 DIAGNOSIS — M79.602 PARESTHESIA AND PAIN OF BOTH UPPER EXTREMITIES: ICD-10-CM

## 2024-02-06 DIAGNOSIS — R42 DIZZINESS: ICD-10-CM

## 2024-02-06 DIAGNOSIS — R20.2 PARESTHESIA AND PAIN OF BOTH UPPER EXTREMITIES: ICD-10-CM

## 2024-02-06 DIAGNOSIS — Z86.69 HISTORY OF CHIARI MALFORMATION: ICD-10-CM

## 2024-02-06 DIAGNOSIS — G44.209 TENSION HEADACHE: ICD-10-CM

## 2024-02-06 DIAGNOSIS — R20.2 LEFT HAND PARESTHESIA: ICD-10-CM

## 2024-02-06 DIAGNOSIS — M54.2 NECK PAIN: ICD-10-CM

## 2024-02-06 DIAGNOSIS — M79.601 PARESTHESIA AND PAIN OF BOTH UPPER EXTREMITIES: ICD-10-CM

## 2024-02-06 LAB
ALBUMIN SERPL BCP-MCNC: 3.9 G/DL (ref 3.2–4.9)
ALBUMIN/GLOB SERPL: 1.3 G/DL
ALP SERPL-CCNC: 91 U/L (ref 30–99)
ALT SERPL-CCNC: 12 U/L (ref 2–50)
ANION GAP SERPL CALC-SCNC: 12 MMOL/L (ref 7–16)
AST SERPL-CCNC: 16 U/L (ref 12–45)
BASOPHILS # BLD AUTO: 0.5 % (ref 0–1.8)
BASOPHILS # BLD: 0.04 K/UL (ref 0–0.12)
BILIRUB SERPL-MCNC: 0.2 MG/DL (ref 0.1–1.5)
BUN SERPL-MCNC: 20 MG/DL (ref 8–22)
CALCIUM ALBUM COR SERPL-MCNC: 9.1 MG/DL (ref 8.5–10.5)
CALCIUM SERPL-MCNC: 9 MG/DL (ref 8.5–10.5)
CHLORIDE SERPL-SCNC: 105 MMOL/L (ref 96–112)
CO2 SERPL-SCNC: 22 MMOL/L (ref 20–33)
CREAT SERPL-MCNC: 0.62 MG/DL (ref 0.5–1.4)
EOSINOPHIL # BLD AUTO: 0.06 K/UL (ref 0–0.51)
EOSINOPHIL NFR BLD: 0.8 % (ref 0–6.9)
ERYTHROCYTE [DISTWIDTH] IN BLOOD BY AUTOMATED COUNT: 41.8 FL (ref 35.9–50)
GFR SERPLBLD CREATININE-BSD FMLA CKD-EPI: 114 ML/MIN/1.73 M 2
GLOBULIN SER CALC-MCNC: 3.1 G/DL (ref 1.9–3.5)
GLUCOSE SERPL-MCNC: 107 MG/DL (ref 65–99)
HCT VFR BLD AUTO: 41.9 % (ref 37–47)
HGB BLD-MCNC: 14.1 G/DL (ref 12–16)
IMM GRANULOCYTES # BLD AUTO: 0.02 K/UL (ref 0–0.11)
IMM GRANULOCYTES NFR BLD AUTO: 0.3 % (ref 0–0.9)
LYMPHOCYTES # BLD AUTO: 2.02 K/UL (ref 1–4.8)
LYMPHOCYTES NFR BLD: 27.6 % (ref 22–41)
MCH RBC QN AUTO: 28 PG (ref 27–33)
MCHC RBC AUTO-ENTMCNC: 33.7 G/DL (ref 32.2–35.5)
MCV RBC AUTO: 83.1 FL (ref 81.4–97.8)
MONOCYTES # BLD AUTO: 0.53 K/UL (ref 0–0.85)
MONOCYTES NFR BLD AUTO: 7.2 % (ref 0–13.4)
NEUTROPHILS # BLD AUTO: 4.65 K/UL (ref 1.82–7.42)
NEUTROPHILS NFR BLD: 63.6 % (ref 44–72)
NRBC # BLD AUTO: 0.02 K/UL
NRBC BLD-RTO: 0.3 /100 WBC (ref 0–0.2)
PLATELET # BLD AUTO: 201 K/UL (ref 164–446)
PMV BLD AUTO: 12 FL (ref 9–12.9)
POTASSIUM SERPL-SCNC: 4.2 MMOL/L (ref 3.6–5.5)
PROT SERPL-MCNC: 7 G/DL (ref 6–8.2)
RBC # BLD AUTO: 5.04 M/UL (ref 4.2–5.4)
SODIUM SERPL-SCNC: 139 MMOL/L (ref 135–145)
T4 FREE SERPL-MCNC: 1.12 NG/DL (ref 0.93–1.7)
TSH SERPL DL<=0.005 MIU/L-ACNC: 0.5 UIU/ML (ref 0.38–5.33)
WBC # BLD AUTO: 7.3 K/UL (ref 4.8–10.8)

## 2024-02-06 PROCEDURE — 85025 COMPLETE CBC W/AUTO DIFF WBC: CPT

## 2024-02-06 PROCEDURE — 96375 TX/PRO/DX INJ NEW DRUG ADDON: CPT

## 2024-02-06 PROCEDURE — 80053 COMPREHEN METABOLIC PANEL: CPT

## 2024-02-06 PROCEDURE — 99284 EMERGENCY DEPT VISIT MOD MDM: CPT

## 2024-02-06 PROCEDURE — 700105 HCHG RX REV CODE 258: Mod: UD | Performed by: EMERGENCY MEDICINE

## 2024-02-06 PROCEDURE — 36415 COLL VENOUS BLD VENIPUNCTURE: CPT

## 2024-02-06 PROCEDURE — 3074F SYST BP LT 130 MM HG: CPT | Performed by: NURSE PRACTITIONER

## 2024-02-06 PROCEDURE — 96365 THER/PROPH/DIAG IV INF INIT: CPT

## 2024-02-06 PROCEDURE — 700111 HCHG RX REV CODE 636 W/ 250 OVERRIDE (IP): Mod: UD | Performed by: EMERGENCY MEDICINE

## 2024-02-06 PROCEDURE — 84443 ASSAY THYROID STIM HORMONE: CPT

## 2024-02-06 PROCEDURE — 70450 CT HEAD/BRAIN W/O DYE: CPT

## 2024-02-06 PROCEDURE — 99215 OFFICE O/P EST HI 40 MIN: CPT | Performed by: NURSE PRACTITIONER

## 2024-02-06 PROCEDURE — 84439 ASSAY OF FREE THYROXINE: CPT

## 2024-02-06 PROCEDURE — 3078F DIAST BP <80 MM HG: CPT | Performed by: NURSE PRACTITIONER

## 2024-02-06 RX ORDER — KETOROLAC TROMETHAMINE 15 MG/ML
15 INJECTION, SOLUTION INTRAMUSCULAR; INTRAVENOUS ONCE
Status: COMPLETED | OUTPATIENT
Start: 2024-02-06 | End: 2024-02-06

## 2024-02-06 RX ORDER — METHOCARBAMOL 750 MG/1
750 TABLET, FILM COATED ORAL 4 TIMES DAILY
Qty: 80 TABLET | Refills: 0 | Status: SHIPPED | OUTPATIENT
Start: 2024-02-06 | End: 2024-02-23

## 2024-02-06 RX ORDER — KETOROLAC TROMETHAMINE 10 MG/1
10 TABLET, FILM COATED ORAL 3 TIMES DAILY PRN
Qty: 15 TABLET | Refills: 0 | Status: SHIPPED | OUTPATIENT
Start: 2024-02-06 | End: 2024-02-23

## 2024-02-06 RX ADMIN — KETOROLAC TROMETHAMINE 15 MG: 15 INJECTION, SOLUTION INTRAMUSCULAR; INTRAVENOUS at 22:01

## 2024-02-06 RX ADMIN — METHOCARBAMOL 1000 MG: 100 INJECTION, SOLUTION INTRAMUSCULAR; INTRAVENOUS at 22:04

## 2024-02-06 ASSESSMENT — PAIN DESCRIPTION - PAIN TYPE: TYPE: ACUTE PAIN

## 2024-02-06 ASSESSMENT — FIBROSIS 4 INDEX
FIB4 SCORE: 1.04
FIB4 SCORE: 1.04

## 2024-02-06 NOTE — ED PROVIDER NOTES
ED Provider Note        CHIEF COMPLAINT  Chief Complaint   Patient presents with    Headache     PT w/ hx of chiari malformation surgical repair presents d/t headache x 2 days and numbness in bilateral arms.          HPI    Mary Gold is a 43 y.o. female who presents to the Emergency Department with headache, arm numbness.  The patient reports a history of Chiari malformation.  Before she had surgery for this she would get arm numbness.  But has been doing well.  She has been having some left arm numbness for the past 2 weeks but yesterday began having numbness in both arms.  She also began having a headache approximate 24 hours prior to arrival.  She reports that just within the past hour the numbness in her hands has improved but continues to have a mild headache.  She did take 800 mg of ibuprofen 3 hours ago which has helped with her headache.  She denies any weakness in the legs, visual changes, dizziness.  She does report that when she was feeling the numbness in her arms it was difficult for her to manage her phone.    REVIEW OF SYSTEMS  See HPI for further details. All other systems are negative.     PAST MEDICAL HISTORY     Past Medical History:   Diagnosis Date    Chiari malformation     Diabetes (HCC)     Dizziness     Fatigue     Heart burn     Indigestion     Numbness and tingling in hands     Other acute pain     headaches    Unspecified hemorrhagic conditions        SURGICAL HISTORY  Past Surgical History:   Procedure Laterality Date    CRANIECTOMY  8/17/2015    Procedure: SUBOCCIPITAL CRANIECTOMY ;  Surgeon: Tenzin Curtis M.D.;  Location: SURGERY [facility];  Service:     CERVICAL LAMINECTOMY POSTERIOR  8/17/2015    Procedure: CERVICAL LAMINECTOMY POSTERIOR C1, DUROPLASTY;  Surgeon: Tenzin Curtis M.D.;  Location: SURGERY [facility];  Service:     GYN SURGERY      OTHER      eye       FAMILY HISTORY  Family History   Problem Relation Age of Onset    Diabetes Mother     Hypertension  "Mother     Diabetes Maternal Aunt        SOCIAL HISTORY    reports that she has never smoked. She has never used smokeless tobacco. She reports that she does not currently use alcohol. She reports that she does not use drugs.    CURRENT MEDICATIONS  Home Medications       Reviewed by Javier Polo R.N. (Registered Nurse) on 02/06/24 at 0001  Med List Status: Not Addressed     Medication Last Dose Status   dicyclomine (BENTYL) 10 MG Cap  Active   ondansetron (ZOFRAN ODT) 4 MG TABLET DISPERSIBLE  Active   oseltamivir (TAMIFLU) 75 MG Cap  Active                    ALLERGIES  Allergies   Allergen Reactions    Pioglitazone Swelling       PHYSICAL EXAM  VITAL SIGNS: BP (!) 128/16   Pulse 73   Temp 36.7 °C (98 °F) (Temporal)   Resp 16   Ht 1.575 m (5' 2\")   Wt 88 kg (194 lb 0.1 oz)   SpO2 98%   BMI 35.48 kg/m²   Gen: Alert, no acute distress  HEENT: ATNC  Eyes: PERRL, EOMI, normal conjunctiva  Neck: trachea midline  Resp: no respiratory distress  CV: No JVD, regular rate and rhythm  Abd: non-distended  Ext: No deformities  Neuro: speech fluent, cranial nerves II through XII intact.  Normal cerebellar function.  NIHSS: 0.  Sensation intact bilateral upper extremities in all nerve distributions.   strength intact, moves all extremities.    DIAGNOSTIC STUDIES / PROCEDURES        RADIOLOGY  I have independently interpreted the diagnostic imaging associated with this visit.  My preliminary interpretation is as follows: CT head: No intracranial bleed  Radiologist interpretation:    CT-HEAD W/O   Final Result         1.  No acute intracranial abnormality.             COURSE & MEDICAL DECISION MAKING  Pertinent Labs & Imaging studies were reviewed. (See chart for details)      EXTERNAL RECORDS REVIEWED  Inpatient Notes   Suboccipital craniectomy 8/17/2015 with Dr. Curtis      INITIAL ASSESSMENT AND PLAN  Care Narrative: Patient presents with report of numbness bilateral upper extremities which has resolved at the " time of my evaluation but also ongoing headache.  She does have a history of Chiari malformation with surgical intervention.  She demonstrates no focal neurologic deficits at this point in time.  Low suspicion for venous sinus thrombosis, stroke, meningitis, subarachnoid hemorrhage.  The patient is declining pain medications at the time of my evaluation and she recently took ibuprofen.  Given her Chiari malformation would cause her to have similar symptoms in the past, will obtain CT imaging to ensure no recurrent compression.  Low suspicion for syringomyelia.    Patient's CAT scan returns reassuring.  Will refer to neurology for follow-up if her symptoms continue.    ADDITIONAL PROBLEM LIST AND DISPOSITION      Decision tools and prescription drugs considered including, but not limited to: NIH Stroke Scale 0 .      Patient is referred to primary care provider for blood pressure, diabetes and all other preventative health services.  Patient was given return precautions, anticipatory guidance, and the opportunity ask questions prior to discharge        FINAL IMPRESSION  1. Acute nonintractable headache, unspecified headache type    2. Paresthesias           DISPOSITION:  Patient will be discharged home in stable condition.    FOLLOW UP:  Tiana Asher P.A.-C.  [address] [address] [phone]          [facility]  [address], [address]  [facility] [address]  [phone]  Schedule an appointment as soon as possible for a visit       [facility], Emergency Dept  [address]  [facility] [address]  [phone]    If symptoms worsen        This dictation was created using voice recognition software. The accuracy of the dictation is limited to the abilities of the software. I expect there may be some errors of grammar and possibly content. The nursing notes were reviewed and certain aspects of this information were incorporated into this note.

## 2024-02-06 NOTE — Clinical Note
Mary Gold was seen and treated in our emergency department on 2/6/2024.  She may return to work on 02/08/2024.       If you have any questions or concerns, please don't hesitate to call.      Shey Do D.O.

## 2024-02-06 NOTE — ED NOTES
Pt ambulates to triage with a steady gait complaining of headache x1 week and bilateral arm weakness starting yesterday. Weakness has since improved. No other deficits appreciated.

## 2024-02-06 NOTE — ED TRIAGE NOTES
"Chief Complaint   Patient presents with    Headache     PT w/ hx of chiari malformation surgical repair presents d/t headache x 2 days and numbness in bilateral arms.      BP (!) 128/16   Pulse 73   Temp 36.7 °C (98 °F) (Temporal)   Resp 16   Ht 1.575 m (5' 2\")   Wt 88 kg (194 lb 0.1 oz)   SpO2 98%   BMI 35.48 kg/m²     "
Patient

## 2024-02-06 NOTE — DISCHARGE INSTRUCTIONS
You were seen in the emerged part for a headache and numbness in the arms that has improved.  Your neurologic exam is reassuring.  The CAT scan of your head demonstrates no abnormalities relating to your history of Chiari malformation or surgery.    If your symptoms continue, please follow-up with the neurology specialist for further evaluations.    For pain you can take acetaminophen (Tylenol), 1000mg every 8 hours as needed for pain. Do not take more than 3000mg of acetaminophen in any 24 hour period. You can also take  ibuprofen (Motrin), 600mg every 6 hours as needed for pain (take with food to avoid GI upset).  Taking these medications regularly during the day can be very effective in controlling pain.    Return to the emergency department or seek medical attention if you develop:  Vomiting, uncontrollable headache, inability to move your arms, any other new or concerning findings

## 2024-02-07 NOTE — PROGRESS NOTES
"Mary Gold is a 43 y.o. female who presents for Headache (X1 month \" I have experienced numbness that starts at my neck and goes into my fingertips. I went to the ER yesterday, but they were unable to find anything wrong. The numbness is worse in my left hand.\"   )      HPI  This is a new problem. Mary Gold is a 43 y.o. patient who presents to urgent care with c/o: hx of  left hand numbness for 1 month but bad for the past 3 days.  She also has symptoms in her right hand but not as bad.  Numbness starts in shoulder and goes to her fingers.  When she is holding her phone her hand goes really numb. Hard to hold things.  Neck pain for 3 days.  Headache for 3 days. Worst headache that she has ever had. She can't even sleep. Taking ibuprofen 2-3 pills, every 6 hours for the past few days. Yesterday she was nauseated. Today she was dizzy - she has never had this before. No other aggravating or alleviating factors.   Went to ER yesterday.  They did a CT scan but \"nothing else\".   Negative hx migraines.       ROS See HPI    Allergies:       Allergies   Allergen Reactions    Pioglitazone Swelling       PMSFS Hx:  Past Medical History:   Diagnosis Date    Chiari malformation     Diabetes (HCC)     Dizziness     Fatigue     Heart burn     Indigestion     Numbness and tingling in hands     Other acute pain     headaches    Unspecified hemorrhagic conditions      Past Surgical History:   Procedure Laterality Date    CRANIECTOMY  8/17/2015    Procedure: SUBOCCIPITAL CRANIECTOMY ;  Surgeon: Tenzin Curtis M.D.;  Location: SURGERY [facility];  Service:     CERVICAL LAMINECTOMY POSTERIOR  8/17/2015    Procedure: CERVICAL LAMINECTOMY POSTERIOR C1, DUROPLASTY;  Surgeon: Tenzin Curtis M.D.;  Location: SURGERY [facility];  Service:     GYN SURGERY      OTHER      eye     Family History   Problem Relation Age of Onset    Diabetes Mother     Hypertension Mother     Diabetes Maternal Aunt      Social History " "    Tobacco Use    Smoking status: Never    Smokeless tobacco: Never   Substance Use Topics    Alcohol use: Not Currently       Problems:   Patient Active Problem List   Diagnosis    Chiari malformation    Elevated liver enzymes    Type 2 diabetes mellitus with hyperglycemia, without long-term current use of insulin (HCC)    Class 2 obesity due to excess calories without serious comorbidity with body mass index (BMI) of 37.0 to 37.9 in adult    Obesity (BMI 30-39.9)    Fatigue    Hepatomegaly       Medications:   Current Outpatient Medications on File Prior to Visit   Medication Sig Dispense Refill    dicyclomine (BENTYL) 10 MG Cap Take 1 Capsule by mouth 4 Times a Day,Before Meals and at Bedtime. (Patient not taking: Reported on 1/4/2024) 120 Capsule 0     No current facility-administered medications on file prior to visit.        Objective:     /68 (BP Location: Left arm, Patient Position: Sitting, BP Cuff Size: Adult long)   Pulse 74   Temp 36.6 °C (97.9 °F) (Temporal)   Resp 16   Ht 1.575 m (5' 2\")   Wt 88 kg (194 lb) Comment: Pt stated  SpO2 96%   BMI 35.48 kg/m²     Physical Exam  Vitals and nursing note reviewed.   Constitutional:       General: She is not in acute distress.     Appearance: Normal appearance. She is well-developed. She is not toxic-appearing.   HENT:      Head: Normocephalic and atraumatic.      Right Ear: Hearing, tympanic membrane, ear canal and external ear normal. Tympanic membrane is not injected or erythematous.      Left Ear: Hearing, tympanic membrane, ear canal and external ear normal. Tympanic membrane is not injected or erythematous.      Nose: Nose normal. No mucosal edema or rhinorrhea.      Right Sinus: No maxillary sinus tenderness or frontal sinus tenderness.      Left Sinus: No maxillary sinus tenderness or frontal sinus tenderness.      Mouth/Throat:      Mouth: Mucous membranes are moist.      Pharynx: Uvula midline.   Eyes:      General: Lids are normal.      " Extraocular Movements:      Right eye: No nystagmus.      Left eye: No nystagmus.      Conjunctiva/sclera: Conjunctivae normal.      Pupils: Pupils are equal, round, and reactive to light.   Neck:      Trachea: Trachea and phonation normal.   Cardiovascular:      Rate and Rhythm: Normal rate and regular rhythm.      Chest Wall: PMI is not displaced.      Pulses: Normal pulses.      Heart sounds: Normal heart sounds. No murmur heard.  Pulmonary:      Effort: Pulmonary effort is normal.      Breath sounds: Normal breath sounds.   Musculoskeletal:         General: Normal range of motion.      Right hand: Normal. Normal sensation.      Left hand: Normal. Normal sensation.      Cervical back: Full passive range of motion without pain, normal range of motion and neck supple. No erythema. Muscular tenderness present. No spinous process tenderness. Normal range of motion.      Comments: Neg tinel and phalen.   Strong equal hand  bilaterally 5/5   Lymphadenopathy:      Cervical: No cervical adenopathy.   Skin:     General: Skin is warm and dry.      Capillary Refill: Capillary refill takes less than 2 seconds.   Neurological:      Mental Status: She is alert and oriented to person, place, and time.      Cranial Nerves: No cranial nerve deficit.      Sensory: Sensation is intact.      Motor: Motor function is intact. No tremor, atrophy or abnormal muscle tone.      Coordination: Coordination is intact.      Gait: Gait is intact.      Deep Tendon Reflexes:      Reflex Scores:       Brachioradialis reflexes are 2+ on the right side and 2+ on the left side.       Patellar reflexes are 2+ on the right side and 2+ on the left side.  Psychiatric:         Attention and Perception: Attention normal.         Mood and Affect: Mood normal.         Speech: Speech normal.         Behavior: Behavior normal. Behavior is cooperative.         Thought Content: Thought content normal.         Judgment: Judgment normal.         Assessment  /Associated Orders:      1. Nonintractable headache, unspecified chronicity pattern, unspecified headache type        2. Paresthesia and pain of both upper extremities        3. Neck pain        4. Dizziness              Medical Decision Making:    Pt's clinical presentation and exam today indicate a need for higher level of care with further evaluation and/or diagnostics.   [facility] was  called to arrange transfer to higher level of care in [facility] ER.  Pt is to be transported via POV with her . .   I have reiterated to patient that although an Urgent Care to ER transfer was made this will not necessarily expedite the ER process      Please note that this dictation was created using voice recognition software. I have worked with consultants from the vendor as well as technical experts from [facility] to optimize the interface. I have made every reasonable attempt to correct obvious errors, but I expect that there are errors of grammar and possibly content that I did not discover before finalizing the note.  This note was electronically signed by provider

## 2024-02-07 NOTE — ED PROVIDER NOTES
ER Provider Note    Scribed for Dr. Shey Do D.O. by Lowell Rosa. 2/6/2024  9:03 PM    Primary Care Provider: Tiana Asher P.A.-C.    CHIEF COMPLAINT  Chief Complaint   Patient presents with    Headache     Headache for 3 days. Seen at [facility] yesterday, CT head negative. Referred to neurology. Patient coming to ER today for new onset dizziness.        EXTERNAL RECORDS REVIEWED  Outpatient Notes Patient seen one day ago at [facility] for headache and arm numbness. Patient's CT imaging reassuring, and was referred to neurology for follow-up.     HPI/ROS  LIMITATION TO HISTORY   Select: : None    OUTSIDE HISTORIAN(S):  None.    Mary Gold is a 43 y.o. female who presents to the ED for evaluation of headache onset 3 days ago. The patient states she was seen at [facility] one day ago for similar complaints, and she had a CT-Head performed which was unremarkable. The patient localizes the headache to the base of her occiput on both sides, noting the pain radiates into her neck. The patient reports bilateral hand numbness, left worse than right. The patient reports numbness in her hands for 30 minutes at a time once a month, but now it is more persistent recently. The patient states she works at [employer], and her job involves lots of repetitive motion. The patient states reports she is right hand dominant. The patient reports a surgical history for a Chiari malformation by Dr. Curtis in 2015    PAST MEDICAL HISTORY  Past Medical History:   Diagnosis Date    Chiari malformation     Diabetes (HCC)     Dizziness     Fatigue     Heart burn     Indigestion     Numbness and tingling in hands     Other acute pain     headaches    Unspecified hemorrhagic conditions        SURGICAL HISTORY  Past Surgical History:   Procedure Laterality Date    CRANIECTOMY  8/17/2015    Procedure: SUBOCCIPITAL CRANIECTOMY ;  Surgeon: Tenzin Curtis M.D.;  Location: SURGERY [facility];  Service:     CERVICAL  "LAMINECTOMY POSTERIOR  8/17/2015    Procedure: CERVICAL LAMINECTOMY POSTERIOR C1, DUROPLASTY;  Surgeon: Tenzin Curtis M.D.;  Location: SURGERY [facility];  Service:     GYN SURGERY      OTHER      eye       FAMILY HISTORY  Family History   Problem Relation Age of Onset    Diabetes Mother     Hypertension Mother     Diabetes Maternal Aunt        SOCIAL HISTORY   reports that she has never smoked. She has never used smokeless tobacco. She reports that she does not currently use alcohol. She reports that she does not use drugs.    CURRENT MEDICATIONS  Previous Medications    DICYCLOMINE (BENTYL) 10 MG CAP    Take 1 Capsule by mouth 4 Times a Day,Before Meals and at Bedtime.       ALLERGIES  Pioglitazone    PHYSICAL EXAM  /57   Pulse 60   Temp 36.4 °C (97.5 °F) (Temporal)   Resp 15   Ht 1.575 m (5' 2\")   Wt 87.3 kg (192 lb 7.4 oz)   LMP  (LMP Unknown) Comment: last week of October  SpO2 96%   BMI 35.20 kg/m²   Constitutional: Patient is well developed, well nourished. Uncomfortable-appearing. Moderate distress.   HENT: Normocephalic, atraumatic. Marked tenderness at base of her occiput on both sides extending into her cervical paraspinal muscle.   Eyes: PERRL, EOMI, Conjunctiva without erythema or exudates.Photophobic.   Cardiovascular: Normal heart rate and Regular rhythm. No murmur.  Thorax & Lungs: Clear and equal breath sounds with good excursion. No respiratory distress, no rhonchi, wheezing or rales.   Abdomen: Bowel sounds normal in all four quadrants. Obese, Soft, nontender, no rebound , guarding, palpable masses.   Skin: Pale, Warm, Dry, No erythema, No rashes.   Extremities: Peripheral pulses 4/4 No edema, No tenderness, Normal ROM. Subjective paresthesia in left hand that worsens with Tinel's test.   Neurologic: Alert & oriented x 3, Normal motor function, Normal sensory function, No lateralizing or focal deficits noted.   Psychiatric: Affect normal, Judgment normal, Mood normal. "       DIAGNOSTIC STUDIES & PROCEDURES    Labs:   Results for orders placed or performed during the hospital encounter of 02/06/24   CBC WITH DIFFERENTIAL   Result Value Ref Range    WBC 7.3 4.8 - 10.8 K/uL    RBC 5.04 4.20 - 5.40 M/uL    Hemoglobin 14.1 12.0 - 16.0 g/dL    Hematocrit 41.9 37.0 - 47.0 %    MCV 83.1 81.4 - 97.8 fL    MCH 28.0 27.0 - 33.0 pg    MCHC 33.7 32.2 - 35.5 g/dL    RDW 41.8 35.9 - 50.0 fL    Platelet Count 201 164 - 446 K/uL    MPV 12.0 9.0 - 12.9 fL    Neutrophils-Polys 63.60 44.00 - 72.00 %    Lymphocytes 27.60 22.00 - 41.00 %    Monocytes 7.20 0.00 - 13.40 %    Eosinophils 0.80 0.00 - 6.90 %    Basophils 0.50 0.00 - 1.80 %    Immature Granulocytes 0.30 0.00 - 0.90 %    Nucleated RBC 0.30 (H) 0.00 - 0.20 /100 WBC    Neutrophils (Absolute) 4.65 1.82 - 7.42 K/uL    Lymphs (Absolute) 2.02 1.00 - 4.80 K/uL    Monos (Absolute) 0.53 0.00 - 0.85 K/uL    Eos (Absolute) 0.06 0.00 - 0.51 K/uL    Baso (Absolute) 0.04 0.00 - 0.12 K/uL    Immature Granulocytes (abs) 0.02 0.00 - 0.11 K/uL    NRBC (Absolute) 0.02 K/uL   COMP METABOLIC PANEL   Result Value Ref Range    Sodium 139 135 - 145 mmol/L    Potassium 4.2 3.6 - 5.5 mmol/L    Chloride 105 96 - 112 mmol/L    Co2 22 20 - 33 mmol/L    Anion Gap 12.0 7.0 - 16.0    Glucose 107 (H) 65 - 99 mg/dL    Bun 20 8 - 22 mg/dL    Creatinine 0.62 0.50 - 1.40 mg/dL    Calcium 9.0 8.5 - 10.5 mg/dL    Correct Calcium 9.1 8.5 - 10.5 mg/dL    AST(SGOT) 16 12 - 45 U/L    ALT(SGPT) 12 2 - 50 U/L    Alkaline Phosphatase 91 30 - 99 U/L    Total Bilirubin 0.2 0.1 - 1.5 mg/dL    Albumin 3.9 3.2 - 4.9 g/dL    Total Protein 7.0 6.0 - 8.2 g/dL    Globulin 3.1 1.9 - 3.5 g/dL    A-G Ratio 1.3 g/dL   TSH   Result Value Ref Range    TSH 0.500 0.380 - 5.330 uIU/mL   FREE THYROXINE   Result Value Ref Range    Free T-4 1.12 0.93 - 1.70 ng/dL   ESTIMATED GFR   Result Value Ref Range    GFR (CKD-EPI) 114 >60 mL/min/1.73 m 2     All labs reviewed by me.    COURSE & MEDICAL DECISION  MAKING    ED Observation Status? No; Patient does not meet criteria for ED Observation.     INITIAL ASSESSMENT AND PLAN  Care Narrative:       9:03 PM - Patient seen and evaluated at bedside. Discussed plan of care, including lab work. Patient agrees to plan of care. Patient will be treated with Toradol 15 mg injection and Robaxin 1 g in  mL IVPB for her symptoms. Ordered CMP, CBC w/ diff, and TSH to evaluate. Differential diagnoses include but are not limited to: Tension headache cervical muscle spasm, thyroid, carpal tunnel  Patient's labs were unremarkable.  Since she already had a CT done of her head I did not feel the need to repeat this.  She is in moderate distress    23:00 PM - Patient was reevaluated at bedside. Discussed lab results with the patient and informed them the results are reassuring. I informed the patient of my plan to discharge, and to follow up with their PCP in 3 days for a recheck. I advised the patient to apply warm compresses to the back of your neck after using topical Biofreeze. I informed the patient I am prescribing Toradol and Robaxin, and to take them as prescribed. I also advised the patient to follow up with orthopedics in 1 week for evaluation of the tingling in his hand. Patient was given the opportunity for questions. I addressed all questions or concerns at this time and they verbalize agreement to the plan of care.       ADDITIONAL PROBLEM LIST AND DISPOSITION  Previous Chiari malformation               DISPOSITION AND DISCUSSIONS  I have discussed management of the patient with the following physicians and DIGNA's: None.    Discussion of management with other QHP or appropriate source(s): None     Escalation of care considered, and ultimately not performed: acute inpatient care management, however at this time, the patient is most appropriate for outpatient management.    Barriers to care at this time, including but not limited to:  None .     Decision tools and  prescription drugs considered including, but not limited to: Pain Medications Toradol and Robaxin .    The patient will return for new or worsening symptoms and is stable at the time of discharge.    The patient is referred to a primary physician for blood pressure management, diabetic screening, and for all other preventative health concerns.      DISPOSITION:  Patient will be discharged home in stable condition.    FOLLOW UP:  Tiana Asher P.A.-C.  [address] [place] [id number] [phone]    Schedule an appointment as soon as possible for a visit in 3 days  For recheck    Oziel Clinton M.D.  [address]  [place] [id number] [phone]    Schedule an appointment as soon as possible for a visit in 1 week  For evaluation of your tingling in your hand      OUTPATIENT MEDICATIONS:  New Prescriptions    KETOROLAC (TORADOL) 10 MG TAB    Take 1 Tablet by mouth 3 times a day as needed for Moderate Pain. With food    METHOCARBAMOL (ROBAXIN) 750 MG TAB    Take 1 Tablet by mouth 4 times a day. As needed for muscle spasm/strain         FINAL IMPRESSION   1. Tension headache    2. Cervical paraspinal muscle spasm    3. Left hand paresthesia    4. History of Chiari malformation        Lowell VASQUEZ (Bandar), am scribing for, and in the presence of, Shey Do D.O..    Electronically signed by: Lowell Rosa (Bandar), 2/6/2024    Shey VASQUEZ D.O. personally performed the services described in this documentation, as scribed by Lowell Rosa in my presence, and it is both accurate and complete.    The note accurately reflects work and decisions made by me.  Shey Do D.O.  2/7/2024  12:57 AM

## 2024-02-07 NOTE — DISCHARGE INSTRUCTIONS
Apply warm compresses to the back of your neck after using topical Biofreeze.  Take the medications and prescribing you as directed with food  I am giving you muscle relaxers and anti-inflammatories.  Get a cock up splint at [facility] for your left wrist as this will take the tension off of your carpal tunnel area and then follow-up with orthopedic surgery with [place] [facility].  Rest and return if any problems or worsening

## 2024-02-07 NOTE — ED NOTES
Bedside report received from off going RN: Agnieszka, assumed care of patient.  POC discussed with patient. Call light within reach, all needs addressed at this time.       Fall risk interventions in place: Patient's personal possessions are with in their safe reach and Keep floor surfaces clean and dry (all applicable per [place] Fall risk assessment)   Continuous monitoring: Pulse Ox or Blood Pressure  IVF/IV medications: Infusion per MAR (List Med(s)) Robaxin  Oxygen: Room Air  Bedside sitter: Not Applicable   Isolation: Not Applicable

## 2024-02-07 NOTE — ED TRIAGE NOTES
Mary Gold  43 y.o. female  Chief Complaint   Patient presents with    Headache     Headache for 3 days. Seen at [facility] yesterday, CT head negative. Referred to neurology. Patient coming to ER today for new onset dizziness.      Pt ambulatory to triage with steady gait for above complaint.     Pt is GCS 15, speaking in full sentences, follows commands and responds appropriately to questions. Resp are even and unlabored.     Pt placed in ED lobby. Pt educated on triage process. Pt encouraged to alert staff for any changes.       Vitals:    02/06/24 1923   BP: 128/70   Pulse: 77   Resp: 18   Temp: 36.4 °C (97.5 °F)   SpO2: 98%

## 2024-02-21 ENCOUNTER — APPOINTMENT (OUTPATIENT)
Dept: PHYSICAL MEDICINE AND REHAB | Facility: MEDICAL CENTER | Age: 44
End: 2024-02-21
Payer: COMMERCIAL
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
## 2024-02-23 ENCOUNTER — OFFICE VISIT (OUTPATIENT)
Dept: MEDICAL GROUP | Facility: IMAGING CENTER | Age: 44
End: 2024-02-23
Payer: COMMERCIAL

## 2024-02-23 VITALS
HEART RATE: 71 BPM | RESPIRATION RATE: 16 BRPM | BODY MASS INDEX: 35.15 KG/M2 | DIASTOLIC BLOOD PRESSURE: 74 MMHG | SYSTOLIC BLOOD PRESSURE: 130 MMHG | OXYGEN SATURATION: 98 % | TEMPERATURE: 98.2 F | HEIGHT: 62 IN | WEIGHT: 191 LBS

## 2024-02-23 DIAGNOSIS — Z23 ENCOUNTER FOR ADMINISTRATION OF VACCINE: ICD-10-CM

## 2024-02-23 DIAGNOSIS — Z13.21 ENCOUNTER FOR VITAMIN DEFICIENCY SCREENING: ICD-10-CM

## 2024-02-23 DIAGNOSIS — E11.65 TYPE 2 DIABETES MELLITUS WITH HYPERGLYCEMIA, WITHOUT LONG-TERM CURRENT USE OF INSULIN (HCC): ICD-10-CM

## 2024-02-23 DIAGNOSIS — R20.2 PARESTHESIA OF HAND, BILATERAL: ICD-10-CM

## 2024-02-23 DIAGNOSIS — Z12.31 ENCOUNTER FOR SCREENING MAMMOGRAM FOR BREAST CANCER: ICD-10-CM

## 2024-02-23 DIAGNOSIS — G62.9 NEUROPATHY: ICD-10-CM

## 2024-02-23 DIAGNOSIS — Z11.4 SCREENING FOR HIV (HUMAN IMMUNODEFICIENCY VIRUS): ICD-10-CM

## 2024-02-23 DIAGNOSIS — Z11.59 NEED FOR HEPATITIS C SCREENING TEST: ICD-10-CM

## 2024-02-23 DIAGNOSIS — R51.9 ACUTE NONINTRACTABLE HEADACHE, UNSPECIFIED HEADACHE TYPE: ICD-10-CM

## 2024-02-23 DIAGNOSIS — E66.9 OBESITY (BMI 30-39.9): ICD-10-CM

## 2024-02-23 DIAGNOSIS — Z86.69 HISTORY OF CHIARI MALFORMATION: ICD-10-CM

## 2024-02-23 PROBLEM — E66.812 CLASS 2 OBESITY DUE TO EXCESS CALORIES WITHOUT SERIOUS COMORBIDITY WITH BODY MASS INDEX (BMI) OF 37.0 TO 37.9 IN ADULT: Status: RESOLVED | Noted: 2022-02-11 | Resolved: 2024-02-23

## 2024-02-23 PROBLEM — R74.8 ELEVATED LIVER ENZYMES: Status: RESOLVED | Noted: 2022-02-10 | Resolved: 2024-02-23

## 2024-02-23 PROBLEM — E66.09 CLASS 2 OBESITY DUE TO EXCESS CALORIES WITHOUT SERIOUS COMORBIDITY WITH BODY MASS INDEX (BMI) OF 37.0 TO 37.9 IN ADULT: Status: RESOLVED | Noted: 2022-02-11 | Resolved: 2024-02-23

## 2024-02-23 LAB
GLUCOSE BLD-MCNC: 136 MG/DL (ref 65–99)
HBA1C MFR BLD: 5.8 % (ref ?–5.8)
POCT INT CON NEG: NEGATIVE
POCT INT CON POS: POSITIVE

## 2024-02-23 PROCEDURE — 90636 HEP A/HEP B VACC ADULT IM: CPT | Performed by: STUDENT IN AN ORGANIZED HEALTH CARE EDUCATION/TRAINING PROGRAM

## 2024-02-23 PROCEDURE — 90677 PCV20 VACCINE IM: CPT | Performed by: STUDENT IN AN ORGANIZED HEALTH CARE EDUCATION/TRAINING PROGRAM

## 2024-02-23 PROCEDURE — 3078F DIAST BP <80 MM HG: CPT | Performed by: STUDENT IN AN ORGANIZED HEALTH CARE EDUCATION/TRAINING PROGRAM

## 2024-02-23 PROCEDURE — 90471 IMMUNIZATION ADMIN: CPT | Performed by: STUDENT IN AN ORGANIZED HEALTH CARE EDUCATION/TRAINING PROGRAM

## 2024-02-23 PROCEDURE — 82962 GLUCOSE BLOOD TEST: CPT | Performed by: STUDENT IN AN ORGANIZED HEALTH CARE EDUCATION/TRAINING PROGRAM

## 2024-02-23 PROCEDURE — 99215 OFFICE O/P EST HI 40 MIN: CPT | Mod: 25 | Performed by: STUDENT IN AN ORGANIZED HEALTH CARE EDUCATION/TRAINING PROGRAM

## 2024-02-23 PROCEDURE — 90472 IMMUNIZATION ADMIN EACH ADD: CPT | Performed by: STUDENT IN AN ORGANIZED HEALTH CARE EDUCATION/TRAINING PROGRAM

## 2024-02-23 PROCEDURE — 83036 HEMOGLOBIN GLYCOSYLATED A1C: CPT | Performed by: STUDENT IN AN ORGANIZED HEALTH CARE EDUCATION/TRAINING PROGRAM

## 2024-02-23 PROCEDURE — 92250 FUNDUS PHOTOGRAPHY W/I&R: CPT | Performed by: STUDENT IN AN ORGANIZED HEALTH CARE EDUCATION/TRAINING PROGRAM

## 2024-02-23 PROCEDURE — 3075F SYST BP GE 130 - 139MM HG: CPT | Performed by: STUDENT IN AN ORGANIZED HEALTH CARE EDUCATION/TRAINING PROGRAM

## 2024-02-23 RX ORDER — GABAPENTIN 100 MG/1
100 CAPSULE ORAL
Qty: 60 CAPSULE | Refills: 3 | Status: SHIPPED | OUTPATIENT
Start: 2024-02-23 | End: 2024-03-07

## 2024-02-23 ASSESSMENT — FIBROSIS 4 INDEX: FIB4 SCORE: 0.97

## 2024-02-23 ASSESSMENT — PATIENT HEALTH QUESTIONNAIRE - PHQ9: CLINICAL INTERPRETATION OF PHQ2 SCORE: 0

## 2024-02-23 NOTE — PATIENT INSTRUCTIONS
Thank you for choosing [facility]. It was a pleasure meeting you today.     Take care!  [facility]iana [facility]

## 2024-02-23 NOTE — PROGRESS NOTES
Subjective:       CC:   Chief Complaint   Patient presents with    Establish Care    Back Problem     Going to see a doctor for it       HPI:   Mary is a 43 y.o. female is new to me and is here today to establish care. Previous PCP was none. Specialists:[facility]. Pt would like to discuss the following today:    HA: Seen at [facility] ED 2/6/23 for eval of HA and dizziness. She was seen the day before for eval of headache, CT was negative. She was referred to neurology. History of chiari malformation by Dr. Curtis in 2015. Toradol 15mg IM, Robaxin 1g, and NS given. Labs unremarkable. Discharged home with prescription for Toradol and Robaxin. States symptoms have improved significantly. HA are located on frontal lobe, rated 4/10, don't radiate. Dizziness resolved. Denies history of migraines. Denies weakness and vision changes. Has appt with [facility] neurology on 5/23/24.    Paresthesia: chronic, present since 2015 after chiari malformation surgery, worsened in the last month. Starts in her arm and radiates to hand. L worse than R. Paresthesia is located all throughout hands and all fingers. Also has paresthesia on both feet but not as bad. Denies history of carpal tunnel. She works for [employer] and does repetitive hand motion.     DM: Chronic. She is established with endocrinologist Driss Allen PA-C. Next follow up is in 6 months. She is not sure when her last A1C was. BS at home are 100 fasting and 130-140 nonfasting.  Per patient medications were discontinued because her A1c has been on prediabetes range.    Health Maintenance/Immunizations: Due for mammogram and  pap smear.  Recommend influenza and COVID-19 immunizations at her pharmacy.  We do not have them available here in the clinic.    ROS:   See HPI    Patient Active Problem List    Diagnosis Date Noted    Paresthesia of hand, bilateral 02/23/2024    Neuropathy 02/23/2024    Acute nonintractable headache 02/23/2024    History of Chiari malformation 02/23/2024     Fatigue 05/28/2022    Hepatomegaly 05/28/2022    Type 2 diabetes mellitus with hyperglycemia, without long-term current use of insulin (HCC) 02/11/2022    Obesity (BMI 30-39.9) 02/11/2022       Past Medical History:   Diagnosis Date    Chiari malformation     Diabetes (HCC)     Dizziness     Fatigue     Heart burn     Indigestion     Numbness and tingling in hands     Other acute pain     headaches    Unspecified hemorrhagic conditions        Current Outpatient Medications   Medication Sig Dispense Refill    gabapentin (NEURONTIN) 100 MG Cap Take 1 Capsule by mouth 2 (two) times a day. 60 Capsule 3     No current facility-administered medications for this visit.       Allergies as of 02/23/2024 - Reviewed 02/23/2024   Allergen Reaction Noted    Pioglitazone Swelling 01/04/2024       Social History     Socioeconomic History    Marital status:      Spouse name: Not on file    Number of children: Not on file    Years of education: Not on file    Highest education level: Not on file   Occupational History    Not on file   Tobacco Use    Smoking status: Never    Smokeless tobacco: Never   Vaping Use    Vaping Use: Never used   Substance and Sexual Activity    Alcohol use: Not Currently    Drug use: No    Sexual activity: Not Currently   Other Topics Concern    Not on file   Social History Narrative    ** Merged History Encounter **          Social Determinants of Health     Financial Resource Strain: Not on file   Food Insecurity: Not on file   Transportation Needs: Not on file   Physical Activity: Not on file   Stress: Not on file   Social Connections: Not on file   Intimate Partner Violence: Not on file   Housing Stability: Not on file       Family History   Problem Relation Age of Onset    Diabetes Mother     Hypertension Mother     Diabetes Maternal Aunt        Past Surgical History:   Procedure Laterality Date    CRANIECTOMY  8/17/2015    Procedure: SUBOCCIPITAL CRANIECTOMY ;  Surgeon: Tenzin Curtis M.D.;   "Location: SURGERY [facility];  Service:     CERVICAL LAMINECTOMY POSTERIOR  8/17/2015    Procedure: CERVICAL LAMINECTOMY POSTERIOR C1, DUROPLASTY;  Surgeon: Tenzin Curtis M.D.;  Location: SURGERY [facility];  Service:     GYN SURGERY      OTHER      eye           Objective:     /74 (BP Location: Left arm, Patient Position: Sitting, BP Cuff Size: Adult)   Pulse 71   Temp 36.8 °C (98.2 °F) (Temporal)   Resp 16   Ht 1.575 m (5' 2\")   Wt 86.6 kg (191 lb)   LMP  (LMP Unknown) Comment: last week of October  SpO2 98%   BMI 34.93 kg/m²     Physical Exam  Constitutional:       General: She is not in acute distress.     Appearance: Normal appearance.   HENT:      Head: Normocephalic and atraumatic.   Eyes:      General: No scleral icterus.  Neck:      Thyroid: No thyroid mass or thyromegaly.   Cardiovascular:      Rate and Rhythm: Normal rate and regular rhythm.      Pulses:           Dorsalis pedis pulses are 2+ on the right side and 2+ on the left side.        Posterior tibial pulses are 2+ on the right side and 2+ on the left side.      Heart sounds: Normal heart sounds. No murmur heard.  Pulmonary:      Effort: Pulmonary effort is normal.      Breath sounds: Normal breath sounds. No wheezing.   Abdominal:      General: Bowel sounds are normal. There is no distension.      Palpations: Abdomen is soft. There is no mass.      Tenderness: There is no abdominal tenderness.   Musculoskeletal:         General: No swelling.      Right forearm: No swelling, deformity or tenderness.      Left forearm: No swelling, deformity or tenderness.      Right wrist: No swelling, deformity or tenderness. Normal range of motion.      Left wrist: No swelling, deformity or tenderness. Normal range of motion.      Right hand: No swelling or tenderness. Normal range of motion. Normal strength. Normal sensation.      Left hand: No swelling or tenderness. Normal range of motion. Normal strength. Normal sensation.      " Cervical back: Neck supple.      Right foot: No foot drop.      Left foot: No foot drop.      Comments: Positive Tinel's and Phalen's tests   Feet:      Right foot:      Protective Sensation: 10 sites tested.  10 sites sensed.      Skin integrity: Skin integrity normal. No ulcer, erythema or warmth.      Left foot:      Protective Sensation:   10 sites sensed.      Skin integrity: Skin integrity normal. No ulcer, erythema or warmth.   Skin:     General: Skin is warm and dry.   Neurological:      General: No focal deficit present.      Mental Status: She is alert and oriented to person, place, and time.      Cranial Nerves: No cranial nerve deficit.      Sensory: No sensory deficit.      Motor: No weakness.      Coordination: Coordination normal.      Gait: Gait normal.   Psychiatric:         Mood and Affect: Mood normal.         Behavior: Behavior normal.         Thought Content: Thought content normal.         Judgment: Judgment normal.            Assessment and Plan:     1. Paresthesia of hand, bilateral  Chronic. L worse than R.  Suspect carpal tunnel.  Referred for NCS and OT.  Sent prescription for gabapentin 100 mg twice daily as needed to help with symptoms.  Counseled patient on medication and possible side effects such as drowsiness and sedation.  Advised patient not to drive or drink alcohol while taking this medication.  Recommended wearing a brace at night.  Will follow-up after completing NCS.  Will refer to Ortho if symptoms do not improve with conservative treatment and OT.  - gabapentin (NEURONTIN) 100 MG Cap; Take 1 Capsule by mouth 2 (two) times a day.  Dispense: 60 Capsule; Refill: 3  - Referral to Neurodiagnostics (EEG,EP,EMG/NCS/DBS)  - Referral to Occupational Therapy    2. Type 2 diabetes mellitus with hyperglycemia, without long-term current use of insulin (HCC)  Chronic and well-controlled.  A1c is 5.8 today.  Patient is not taking medications.  Normal monofilament test.  Point-of-care  retinal exam done today.  Will check microalbumin to creatinine ratio and urinalysis.  Referred to ophthalmology for thorough eye examination.  Will check lipid panel.  TSH, CMP, and CBC were unremarkable at ED 2/2024.  Advised patient to continue with healthy diet and exercise.  She is established with Driss Allen PA-C endocrinologist and will follow-up as planned.  - POCT Glucose  - POCT Hemoglobin A1C  - Lipid Profile; Future  - POCT Retinal Eye Exam  - Microalbumin Creat Ratio Urine - Lab Collect; Future  - Referral to Ophthalmology  - Diabetic Monofilament Lower Extremity Exam  - URINALYSIS; Future   Latest Reference Range & Units 02/23/24 09:33   Glycohemoglobin 5.8 % 5.8      Latest Reference Range & Units 02/23/24 09:27   Glucose - Accu-Ck 65 - 99 mg/dL 136 !   !: Data is abnormal    3. Neuropathy  Chronic and stable.  Normal monofilament test.  Will monitor.    4. Obesity (BMI 30-39.9)  Chronic.  Recommend healthy diet and exercise.  She is due for lipid panel.  Improve your eating habits. Eat a variety of foods from each food group. Include grains, vegetables, fruits, dairy, and protein foods. Limit foods high in fat, sugar, and calories. Eat slowly. And don't do anything else, such as watch TV, while you are eating. Pay attention to portion sizes. Put your food on a smaller plate.  Regular activity can help you feel better, have more energy, and burn more calories. If you haven't been active, start slowly. Start with at least 30 minutes of moderate activity on most days of the week. Then gradually increase the amount of activity. Try for 60 or 90 minutes a day, at least 5 days a week.   - Lipid Profile; Future    5. Acute nonintractable headache, unspecified headache type  6. History of Chiari malformation  Acute.  Has been seen twice at the emergency room for evaluation.  CT was negative.  Labs unremarkable.  She was referred to [facility] neurology and has appointment on 5/23/24.  Per patient headache has  been improving.  Dizziness resolved.  Normal neuro exam today.  Vital signs are stable.  Advised patient to return to clinic if headache worsens for further evaluation; otherwise will await neurology evaluation.    7. Encounter for administration of vaccine  Patient received Twinrix and pneumococcal vaccines today.  Tolerated well.  - TWINRIX HepA/HepB IM  - Pneumococcal Conjugate Vaccine 20-Valent (6 wks+)    8. Need for hepatitis C screening test  - HEP C VIRUS ANTIBODY; Future    9. Screening for HIV (human immunodeficiency virus)  - HIV AG/AB COMBO ASSAY SCREENING; Future    10. Encounter for vitamin deficiency screening  - VITAMIN D,25 HYDROXY (DEFICIENCY); Future    11. Encounter for screening mammogram for breast cancer  - MA-SCREENING MAMMO BILAT W/TOMOSYNTHESIS W/CAD; Future       Diagnosis and treatment plan explained to pt. Counseled pt on new medication(s) and potential side effects. Pt agreed with treatment plan and verbalized understanding.       Return in about 6 weeks (around 4/5/2024) for HA/paresthesia/test results.     A total of 43 minutes was spent today reviewing chart, reviewing ED records, assessing and examining the patient, ordering tests, and documenting. None of which was spent performing separately billing procedures or ancillary services.     Please note that this dictation was created using voice recognition software. I have made every reasonable attempt to correct obvious errors, but I expect that there are errors of grammar and possibly content that I did not discover before finalizing the note.    Tiana Asher PA-C  [facility]

## 2024-02-26 ENCOUNTER — HOSPITAL ENCOUNTER (EMERGENCY)
Facility: MEDICAL CENTER | Age: 44
End: 2024-02-26
Attending: EMERGENCY MEDICINE
Payer: COMMERCIAL

## 2024-02-26 VITALS
HEART RATE: 70 BPM | DIASTOLIC BLOOD PRESSURE: 60 MMHG | OXYGEN SATURATION: 98 % | SYSTOLIC BLOOD PRESSURE: 129 MMHG | RESPIRATION RATE: 17 BRPM | BODY MASS INDEX: 35.21 KG/M2 | TEMPERATURE: 97.4 F | WEIGHT: 191.36 LBS | HEIGHT: 62 IN

## 2024-02-26 DIAGNOSIS — R19.7 NAUSEA VOMITING AND DIARRHEA: ICD-10-CM

## 2024-02-26 DIAGNOSIS — R11.2 NAUSEA VOMITING AND DIARRHEA: ICD-10-CM

## 2024-02-26 DIAGNOSIS — B34.9 VIRAL SYNDROME: ICD-10-CM

## 2024-02-26 LAB
FLUAV RNA SPEC QL NAA+PROBE: NEGATIVE
FLUBV RNA SPEC QL NAA+PROBE: NEGATIVE
RSV RNA SPEC QL NAA+PROBE: NEGATIVE
SARS-COV-2 RNA RESP QL NAA+PROBE: NOTDETECTED

## 2024-02-26 PROCEDURE — 99283 EMERGENCY DEPT VISIT LOW MDM: CPT

## 2024-02-26 PROCEDURE — 700111 HCHG RX REV CODE 636 W/ 250 OVERRIDE (IP): Mod: UD | Performed by: EMERGENCY MEDICINE

## 2024-02-26 PROCEDURE — 700102 HCHG RX REV CODE 250 W/ 637 OVERRIDE(OP): Mod: UD | Performed by: EMERGENCY MEDICINE

## 2024-02-26 PROCEDURE — 0241U HCHG SARS-COV-2 COVID-19 NFCT DS RESP RNA 4 TRGT ED POC: CPT

## 2024-02-26 PROCEDURE — A9270 NON-COVERED ITEM OR SERVICE: HCPCS | Mod: UD | Performed by: EMERGENCY MEDICINE

## 2024-02-26 RX ORDER — IBUPROFEN 600 MG/1
600 TABLET ORAL ONCE
Status: COMPLETED | OUTPATIENT
Start: 2024-02-26 | End: 2024-02-26

## 2024-02-26 RX ORDER — ONDANSETRON 4 MG/1
4 TABLET, ORALLY DISINTEGRATING ORAL EVERY 6 HOURS PRN
Qty: 10 TABLET | Refills: 0 | Status: SHIPPED | OUTPATIENT
Start: 2024-02-26 | End: 2024-03-20

## 2024-02-26 RX ORDER — ONDANSETRON 4 MG/1
4 TABLET, ORALLY DISINTEGRATING ORAL ONCE
Status: COMPLETED | OUTPATIENT
Start: 2024-02-26 | End: 2024-02-26

## 2024-02-26 RX ADMIN — ONDANSETRON 4 MG: 4 TABLET, ORALLY DISINTEGRATING ORAL at 19:45

## 2024-02-26 RX ADMIN — IBUPROFEN 600 MG: 600 TABLET, FILM COATED ORAL at 19:45

## 2024-02-26 ASSESSMENT — FIBROSIS 4 INDEX: FIB4 SCORE: 0.97

## 2024-02-27 NOTE — ED TRIAGE NOTES
"Chief Complaint   Patient presents with    Flu Like Symptoms     Diarrhea x4 days, chills, weakness, head ache all since Friday.      /62   Pulse 71   Temp 35.8 °C (96.5 °F) (Temporal)   Resp 16   Ht 1.575 m (5' 2\")   Wt 86.8 kg (191 lb 5.8 oz)   LMP  (LMP Unknown) Comment: last week of October  SpO2 97%   BMI 35.00 kg/m²     Pt is ambulatory with a steady gait. Pt educated on triage process and thanked for patience.     "

## 2024-02-27 NOTE — ED PROVIDER NOTES
ED Provider Note    CHIEF COMPLAINT  Chief Complaint   Patient presents with    Flu Like Symptoms     Diarrhea x4 days, chills, weakness, head ache all since Friday.        EXTERNAL RECORDS REVIEWED  Reviewed outpatient clinic visits, recent CT scan of the head    HPI/ROS  LIMITATION TO HISTORY   None  OUTSIDE HISTORIAN(S):  None    Mary Gold is a 43 y.o. female who presents for a constellation of symptoms including body aches mild cough nausea vomiting and nonbloody diarrhea.  She currently denies any dysuria or hematuria.  No black or bloody stools.  No other sick contacts.  No high fever or productive cough headache or neck stiffness.  Patient has been ill for 2 to 3 days.  She has not had any recent exotic or foreign travel, antibiotic exposure or drinking untreated water    PAST MEDICAL HISTORY   has a past medical history of Chiari malformation, Diabetes (HCC), Dizziness, Fatigue, Heart burn, Indigestion, Numbness and tingling in hands, Other acute pain, and Unspecified hemorrhagic conditions.    SURGICAL HISTORY   has a past surgical history that includes other; craniectomy (8/17/2015); cervical laminectomy posterior (8/17/2015); and gyn surgery.    FAMILY HISTORY  Family History   Problem Relation Age of Onset    Diabetes Mother     Hypertension Mother     Diabetes Maternal Aunt        SOCIAL HISTORY  Social History     Tobacco Use    Smoking status: Never    Smokeless tobacco: Never   Vaping Use    Vaping Use: Never used   Substance and Sexual Activity    Alcohol use: Not Currently    Drug use: No    Sexual activity: Not Currently       CURRENT MEDICATIONS  Home Medications       Reviewed by Jennifer Marcos R.N. (Registered Nurse) on 02/26/24 at 1854  Med List Status: Not Addressed     Medication Last Dose Status   gabapentin (NEURONTIN) 100 MG Cap  Active                    ALLERGIES  Allergies   Allergen Reactions    Pioglitazone Swelling       PHYSICAL EXAM  VITAL SIGNS: /62   Pulse 71    "Temp 35.8 °C (96.5 °F) (Temporal)   Resp 16   Ht 1.575 m (5' 2\")   Wt 86.8 kg (191 lb 5.8 oz)   LMP  (LMP Unknown) Comment: last week of October  SpO2 97%   BMI 35.00 kg/m²    Pulse ox interpretation: I interpret this pulse ox as normal.  Constitutional: Alert and oriented x 3, no acute distress  HEENT: Atraumatic normocephalic, pupils are equal round reactive to light extraocular movements are intact. The nares is clear, external ears are normal, mouth shows moist mucous membranes normal dentition for age  Neck: Supple, no JVD no tracheal deviation  Cardiovascular: Regular rate and rhythm no murmur rub or gallop 2+ pulses peripherally x4  Thorax & Lungs: No respiratory distress, no wheezes rales or rhonchi, No chest tenderness.   GI: Soft nontender hyperactive bowel sounds no rebound guarding or rigidity  No flank or CVA tenderness  Skin: Warm dry no acute rash or lesion  Musculoskeletal: Moving all extremities with full range and 5 of 5 strength no acute  deformity  Neurologic: Cranial nerves III through XII are grossly intact no sensory deficit no cerebellar dysfunction   Psychiatric: Appropriate affect for situation at this time          DIAGNOSTIC STUDIES / PROCEDURES      LABS  Results for orders placed or performed during the hospital encounter of 02/26/24   POC CoV-2, FLU A/B, RSV by PCR   Result Value Ref Range    POC Influenza A RNA, PCR Negative Negative    POC Influenza B RNA, PCR Negative Negative    POC RSV, by PCR Negative Negative    POC SARS-CoV-2, PCR NotDetected         RADIOLOGY  None clinically indicated  COURSE & MEDICAL DECISION MAKING    ED Observation Status? No; Patient does not meet criteria for ED Observation.     INITIAL ASSESSMENT, COURSE AND PLAN  Care Narrative:     This is a very pleasant 43-year-old female presents here with nausea vomiting and nonbloody diarrhea now mild cough and bodyaches.  Here her vital signs are reassuring and normal.  Specifically no high fever " hypotension tachycardia or hypoxia.  She is relatively young and healthy with no significant comorbidities or high risk features.  Her lungs are clear and pulse ox is normal.  Viral testing is negative and I suspect she could have either foodborne illness or more likely norovirus given her chills.  I performed serial abdominal exams and she did not have any peritoneal signs.  I considered but did not feel clinically indicated to perform extensive testing such as blood testing administer IV fluids or perform CT scan of the abdomen pelvis.  She was given Zofran and ibuprofen and able to take clear liquids.  I counseled her to return in 12 to 24 hours if she develops any concerning symptoms such as high fever trouble breathing black or bloody stools etc.  I will prescribe Zofran and recommend aggressive Clear liquid diet with electrolyte solution.      ADDITIONAL PROBLEM LIST    DISPOSITION AND DISCUSSIONS  I have discussed management of the patient with the following physicians and DIGNA's: None    Discussion of management with other QHP or appropriate source(s): None    Escalation of care considered, and ultimately not performed:IV fluids, blood analysis, and diagnostic imaging    Barriers to care at this time, including but not limited to: None.     Decision tools and prescription drugs considered including, but not limited to: Patient will be prescribed antiemetics    FINAL DIAGNOSIS  1. Nausea vomiting and diarrhea        2. Viral syndrome  ondansetron (ZOFRAN ODT) 4 MG TABLET DISPERSIBLE               Electronically signed by: Marc Gomez M.D., 2/26/2024 7:20 PM

## 2024-02-29 LAB — RETINAL SCREEN: NEGATIVE

## 2024-03-04 ENCOUNTER — OFFICE VISIT (OUTPATIENT)
Dept: URGENT CARE | Facility: CLINIC | Age: 44
End: 2024-03-04
Payer: COMMERCIAL

## 2024-03-04 VITALS
HEIGHT: 62 IN | WEIGHT: 194.6 LBS | RESPIRATION RATE: 16 BRPM | DIASTOLIC BLOOD PRESSURE: 78 MMHG | SYSTOLIC BLOOD PRESSURE: 108 MMHG | TEMPERATURE: 98.1 F | OXYGEN SATURATION: 97 % | BODY MASS INDEX: 35.81 KG/M2 | HEART RATE: 79 BPM

## 2024-03-04 DIAGNOSIS — R11.0 NAUSEA: ICD-10-CM

## 2024-03-04 DIAGNOSIS — R19.7 DIARRHEA, UNSPECIFIED TYPE: ICD-10-CM

## 2024-03-04 PROCEDURE — 3074F SYST BP LT 130 MM HG: CPT | Performed by: PHYSICIAN ASSISTANT

## 2024-03-04 PROCEDURE — 3078F DIAST BP <80 MM HG: CPT | Performed by: PHYSICIAN ASSISTANT

## 2024-03-04 PROCEDURE — 99214 OFFICE O/P EST MOD 30 MIN: CPT | Performed by: PHYSICIAN ASSISTANT

## 2024-03-04 RX ORDER — ONDANSETRON 4 MG/1
4 TABLET, FILM COATED ORAL EVERY 4 HOURS PRN
Qty: 10 TABLET | Refills: 0 | Status: SHIPPED | OUTPATIENT
Start: 2024-03-04

## 2024-03-04 RX ORDER — LOPERAMIDE HYDROCHLORIDE 2 MG/1
2 CAPSULE ORAL 4 TIMES DAILY PRN
Qty: 30 CAPSULE | Refills: 0 | Status: SHIPPED | OUTPATIENT
Start: 2024-03-04 | End: 2024-03-20

## 2024-03-04 ASSESSMENT — ENCOUNTER SYMPTOMS
COUGH: 1
NAUSEA: 1
BLOOD IN STOOL: 0
FEVER: 0
VOMITING: 0
ABDOMINAL PAIN: 0
DIARRHEA: 1
MYALGIAS: 0
CHILLS: 1
CONSTIPATION: 0

## 2024-03-04 ASSESSMENT — FIBROSIS 4 INDEX: FIB4 SCORE: 0.97

## 2024-03-04 NOTE — LETTER
[facility]  [address] [address]     March 4, 2024    Patient: Mary Gold   YOB: 1980   Date of Visit: 3/4/2024       To Whom It May Concern:    Mary Gold was seen and treated in our department on 3/4/2024. Please excuse her from work on 3/3-3/5/24.    Sincerely,     Roscoe Mahajan P.A.-C.

## 2024-03-04 NOTE — PROGRESS NOTES
Subjective:   Mary Gold is a 43 y.o. female who presents for Diarrhea (X 8 days, diarrhea, cough, headache, fatigue)        Patient presents with concerns of diarrhea, malaise, fatigue for the last 7 to 8 days.  She also endorses chills and occasional headaches.  She is having 2-4 bowel movements a day and states that these are loose and watery.  Nausea typically only occurs after eating.  No vomiting.  No abdominal pain.  Denies sore throat, productive cough, significant nasal congestion and ear pain.  no fevers.  States that several friends and family are ill with similar symptoms.  She has been taking over-the-counter cold meds without significant symptomatic relief.      Review of Systems   Constitutional:  Positive for chills and malaise/fatigue. Negative for fever.   HENT: Negative.     Respiratory:  Positive for cough.    Gastrointestinal:  Positive for diarrhea and nausea. Negative for abdominal pain, blood in stool, constipation, melena and vomiting.   Genitourinary: Negative.    Musculoskeletal:  Negative for myalgias.       PMH:  has a past medical history of Chiari malformation, Diabetes (HCC), Dizziness, Fatigue, Heart burn, Indigestion, Numbness and tingling in hands, Other acute pain, and Unspecified hemorrhagic conditions.    She has no past medical history of COPD or Psychiatric disorder.  MEDS:   Current Outpatient Medications:     ondansetron (ZOFRAN) 4 MG Tab tablet, Take 1 Tablet by mouth every four hours as needed for Nausea/Vomiting., Disp: 10 Tablet, Rfl: 0    loperamide (IMODIUM) 2 MG Cap, Take 1 Capsule by mouth 4 times a day as needed for Diarrhea., Disp: 30 Capsule, Rfl: 0    ondansetron (ZOFRAN ODT) 4 MG TABLET DISPERSIBLE, Take 1 Tablet by mouth every 6 hours as needed for Nausea/Vomiting. (Patient not taking: Reported on 3/4/2024), Disp: 10 Tablet, Rfl: 0    gabapentin (NEURONTIN) 100 MG Cap, Take 1 Capsule by mouth 2 (two) times a day. (Patient not taking: Reported on 3/4/2024),  "Disp: 60 Capsule, Rfl: 3  ALLERGIES:   Allergies   Allergen Reactions    Pioglitazone Swelling     SURGHX:   Past Surgical History:   Procedure Laterality Date    CRANIECTOMY  8/17/2015    Procedure: SUBOCCIPITAL CRANIECTOMY ;  Surgeon: Tenzin Curtis M.D.;  Location: SURGERY [facility];  Service:     CERVICAL LAMINECTOMY POSTERIOR  8/17/2015    Procedure: CERVICAL LAMINECTOMY POSTERIOR C1, DUROPLASTY;  Surgeon: Tenzin Curtis M.D.;  Location: SURGERY [facility];  Service:     GYN SURGERY      OTHER      eye     SOCHX:  reports that she has never smoked. She has never used smokeless tobacco. She reports that she does not currently use alcohol. She reports that she does not use drugs.  FH: Family history was reviewed, no pertinent findings to report   Objective:   /78 (BP Location: Right arm, Patient Position: Sitting, BP Cuff Size: Adult)   Pulse 79   Temp 36.7 °C (98.1 °F) (Temporal)   Resp 16   Ht 1.575 m (5' 2\")   Wt 88.3 kg (194 lb 9.6 oz)   LMP  (LMP Unknown) Comment: last week of October  SpO2 97%   BMI 35.59 kg/m²   Physical Exam  Vitals reviewed.   Constitutional:       General: She is not in acute distress.     Appearance: Normal appearance. She is well-developed. She is not toxic-appearing.   HENT:      Head: Normocephalic and atraumatic.      Right Ear: Tympanic membrane, ear canal and external ear normal.      Left Ear: Tympanic membrane, ear canal and external ear normal.      Nose: Nose normal. No congestion or rhinorrhea.      Mouth/Throat:      Lips: Pink.      Mouth: Mucous membranes are moist.      Pharynx: Oropharynx is clear. Uvula midline.   Cardiovascular:      Rate and Rhythm: Normal rate and regular rhythm.      Heart sounds: Normal heart sounds, S1 normal and S2 normal.   Pulmonary:      Effort: Pulmonary effort is normal. No respiratory distress.      Breath sounds: Normal breath sounds. No stridor. No decreased breath sounds, wheezing, rhonchi or rales. "   Abdominal:      Tenderness: There is no abdominal tenderness.   Skin:     General: Skin is dry.   Neurological:      Comments: Alert and oriented.    Psychiatric:         Speech: Speech normal.         Behavior: Behavior normal.           Assessment/Plan:   1. Diarrhea, unspecified type  - loperamide (IMODIUM) 2 MG Cap; Take 1 Capsule by mouth 4 times a day as needed for Diarrhea.  Dispense: 30 Capsule; Refill: 0    2. Nausea  - ondansetron (ZOFRAN) 4 MG Tab tablet; Take 1 Tablet by mouth every four hours as needed for Nausea/Vomiting.  Dispense: 10 Tablet; Refill: 0    This patient presents with diarrhea consistent with likely viral enteritis or possibly influenza. Doubt acute bacterial diarrhea. Considered, but think unlikely, partial SBO, appendicitis, diverticulitis, other intraabdominal infection. Low suspicion for secondary causes of diarrhea such as hyperadrenergic state, pheo, adrenal crisis, hyperthyroidism, or sepsis. Doubt antibiotic associated diarrhea.    Overall patient's symptoms are improving.  I do not feel that stool studies or antibiotic therapy is indicated at this time.  Recommend that we continue symptomatic care.    Eat a BRAT diet - Bananas, Rice, Applesauce, Toast and other bland foods to prevent inflammation until symptoms resolve. Hydrate with 2-3 liters of water daily.  Recommend Pedialyte or 50/50 water/ Gatorade mix. Avoid strenuous activities and dairy products while having diarrhea. Avoid alcohol, coffee, tea, cola drinks, chocolate, and other foods with caffeine- they increase stomach acid.      Zofran prn nausea.  Immodium as needed for diarrhea.    Sx should improve within the next 48 -72 hrs and gradually resolve over the next 5-7days. If sx are not improving/ resolving within these time frames, new sx develop at any point, or sx worsen RTC for reevaluation.    If you develop severe abdominal pain, bloody stools, high fevers, intractable vomiting, inability to tolerate oral  intake, confusion or other severe and concerning symptom please call 911 or go to the emergency department for reevaluation.

## 2024-03-07 ENCOUNTER — OFFICE VISIT (OUTPATIENT)
Dept: PHYSICAL MEDICINE AND REHAB | Facility: MEDICAL CENTER | Age: 44
End: 2024-03-07
Payer: COMMERCIAL

## 2024-03-07 VITALS
HEART RATE: 78 BPM | BODY MASS INDEX: 35.3 KG/M2 | OXYGEN SATURATION: 97 % | HEIGHT: 62 IN | WEIGHT: 191.8 LBS | SYSTOLIC BLOOD PRESSURE: 123 MMHG | TEMPERATURE: 97 F | DIASTOLIC BLOOD PRESSURE: 73 MMHG

## 2024-03-07 DIAGNOSIS — Z86.69 HISTORY OF CHIARI MALFORMATION: ICD-10-CM

## 2024-03-07 DIAGNOSIS — M54.9 DORSALGIA: ICD-10-CM

## 2024-03-07 DIAGNOSIS — M54.50 CHRONIC BILATERAL LOW BACK PAIN WITHOUT SCIATICA: ICD-10-CM

## 2024-03-07 DIAGNOSIS — G89.29 CHRONIC BILATERAL LOW BACK PAIN WITHOUT SCIATICA: ICD-10-CM

## 2024-03-07 DIAGNOSIS — R20.2 PARESTHESIA OF HAND, BILATERAL: ICD-10-CM

## 2024-03-07 DIAGNOSIS — G89.29 OTHER CHRONIC PAIN: ICD-10-CM

## 2024-03-07 DIAGNOSIS — R20.2 NUMBNESS AND TINGLING IN LEFT ARM: ICD-10-CM

## 2024-03-07 DIAGNOSIS — M79.10 MYALGIA: ICD-10-CM

## 2024-03-07 DIAGNOSIS — R20.0 NUMBNESS AND TINGLING IN LEFT ARM: ICD-10-CM

## 2024-03-07 PROCEDURE — 3074F SYST BP LT 130 MM HG: CPT | Performed by: STUDENT IN AN ORGANIZED HEALTH CARE EDUCATION/TRAINING PROGRAM

## 2024-03-07 PROCEDURE — 99204 OFFICE O/P NEW MOD 45 MIN: CPT | Performed by: STUDENT IN AN ORGANIZED HEALTH CARE EDUCATION/TRAINING PROGRAM

## 2024-03-07 PROCEDURE — 1125F AMNT PAIN NOTED PAIN PRSNT: CPT | Performed by: STUDENT IN AN ORGANIZED HEALTH CARE EDUCATION/TRAINING PROGRAM

## 2024-03-07 PROCEDURE — 3078F DIAST BP <80 MM HG: CPT | Performed by: STUDENT IN AN ORGANIZED HEALTH CARE EDUCATION/TRAINING PROGRAM

## 2024-03-07 RX ORDER — GABAPENTIN 300 MG/1
300 CAPSULE ORAL
Qty: 30 CAPSULE | Refills: 2 | Status: SHIPPED | OUTPATIENT
Start: 2024-03-07

## 2024-03-07 RX ORDER — TIZANIDINE 4 MG/1
2-4 TABLET ORAL NIGHTLY PRN
Qty: 30 TABLET | Refills: 2 | Status: SHIPPED | OUTPATIENT
Start: 2024-03-07

## 2024-03-07 ASSESSMENT — FIBROSIS 4 INDEX: FIB4 SCORE: 0.97

## 2024-03-07 ASSESSMENT — PAIN SCALES - GENERAL: PAINLEVEL: 8=MODERATE-SEVERE PAIN

## 2024-03-07 ASSESSMENT — PATIENT HEALTH QUESTIONNAIRE - PHQ9: CLINICAL INTERPRETATION OF PHQ2 SCORE: 0
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
## 2024-03-20 ENCOUNTER — OFFICE VISIT (OUTPATIENT)
Dept: MEDICAL GROUP | Facility: IMAGING CENTER | Age: 44
End: 2024-03-20
Payer: COMMERCIAL

## 2024-03-20 VITALS
HEIGHT: 62 IN | TEMPERATURE: 98 F | SYSTOLIC BLOOD PRESSURE: 124 MMHG | DIASTOLIC BLOOD PRESSURE: 72 MMHG | BODY MASS INDEX: 35.15 KG/M2 | WEIGHT: 191 LBS | HEART RATE: 83 BPM | OXYGEN SATURATION: 99 % | RESPIRATION RATE: 16 BRPM

## 2024-03-20 DIAGNOSIS — R53.83 FEELING TIRED: ICD-10-CM

## 2024-03-20 DIAGNOSIS — R11.0 NAUSEA: ICD-10-CM

## 2024-03-20 DIAGNOSIS — R10.13 EPIGASTRIC PAIN: ICD-10-CM

## 2024-03-20 PROCEDURE — 3078F DIAST BP <80 MM HG: CPT | Performed by: STUDENT IN AN ORGANIZED HEALTH CARE EDUCATION/TRAINING PROGRAM

## 2024-03-20 PROCEDURE — 3074F SYST BP LT 130 MM HG: CPT | Performed by: STUDENT IN AN ORGANIZED HEALTH CARE EDUCATION/TRAINING PROGRAM

## 2024-03-20 PROCEDURE — 99213 OFFICE O/P EST LOW 20 MIN: CPT | Performed by: STUDENT IN AN ORGANIZED HEALTH CARE EDUCATION/TRAINING PROGRAM

## 2024-03-20 ASSESSMENT — FIBROSIS 4 INDEX: FIB4 SCORE: 0.97

## 2024-03-20 NOTE — PROGRESS NOTES
"Subjective:     CC:   Chief Complaint   Patient presents with    GI Problem     Stomach hurting, diarrhea, headaches since 2/25, diarrhea resolved   3/15 still has stomach pain, nausea    Tired       HPI:   Mary Goldynes, 43 y.o., female,  presents today to discuss:     Nausea, feeling tired: was seen at [facility] on 2/26/23. Records reviewed. POCT influenza, RSV, COVID-19 negative. IV fluids given. She was also RX for Zofran and Ibuprofen. They suspected viral illness and was discharged home. Per pt she has epigastric abdominal pain, it's constant, can be 9/10. She still has nausea. Diarrhea and vomiting resolved. Zofran helps.  She gets mild dizziness and has headaches intermittently. She has appt with neurology in May. She has been very sleepy. She sleeps during the day because she is very tired. She is awake at night. Has not been able to work due to her symptoms and is scheduled tomorrow for FMLA completion.       ROS:  See HPI    Medications, allergies, past medical history, family history, surgical history, and social history documented in chart and reviewed by me.       Objective:   Exam:  /72 (BP Location: Left arm, Patient Position: Sitting, BP Cuff Size: Adult)   Pulse 83   Temp 36.7 °C (98 °F) (Temporal)   Resp 16   Ht 1.575 m (5' 2\")   Wt 86.6 kg (191 lb)   LMP  (LMP Unknown) Comment: last week of October  SpO2 99%   BMI 34.93 kg/m²    Physical Exam  Constitutional:       General: She is not in acute distress.     Appearance: Normal appearance.   HENT:      Head: Normocephalic and atraumatic.   Eyes:      General: No scleral icterus.  Neck:      Thyroid: No thyroid mass or thyromegaly.   Cardiovascular:      Rate and Rhythm: Normal rate and regular rhythm.      Heart sounds: Normal heart sounds. No murmur heard.  Pulmonary:      Effort: Pulmonary effort is normal.      Breath sounds: Normal breath sounds. No wheezing.   Abdominal:      General: Bowel sounds are normal. There is " no distension.      Palpations: Abdomen is soft. There is no mass.      Tenderness: There is generalized abdominal tenderness.   Musculoskeletal:         General: No swelling. Normal range of motion.      Cervical back: Neck supple.   Skin:     General: Skin is warm and dry.   Neurological:      General: No focal deficit present.      Mental Status: She is alert and oriented to person, place, and time.      Gait: Gait normal.   Psychiatric:         Mood and Affect: Mood normal.         Behavior: Behavior normal.         Thought Content: Thought content normal.         Judgment: Judgment normal.              Assessment & Plan:       1. Epigastric pain  2. Nausea  3. Feeling tired  Acute.  Unknown etiology.  Symptoms started almost a month ago.  She was seen at the emergency room and they suspected viral illness.  No labs or imaging were performed.  Will rule out H. pylori infection.  Will check CBC, CMP, iron, B12, and folic acid. VS are stable. Recommend pushing fluids, well balanced meals, exercise as tolerated, avoiding taking naps during the day, and OTC Naproxen with food as needed for pain.  Will follow-up after completing tests.  Strict ED precautions given.  - H. PYLORI BREATH TEST  - US-ABDOMEN COMPLETE SURVEY; Future  - CBC WITH DIFFERENTIAL; Future  - Comp Metabolic Panel; Future  - VIT B12,  FOLIC ACID  - IRON/TOTAL IRON BIND; Future  - FERRITIN; Future        Pt agreed with treatment plan and verbalized understanding.     Return for After completing tests.     Please note that this dictation was created using voice recognition software. I have made every reasonable attempt to correct obvious errors, but I expect that there are errors of grammar and possibly content that I did not discover before finalizing the note.    Tiana Asher PA-C  [facility]

## 2024-03-21 ENCOUNTER — APPOINTMENT (OUTPATIENT)
Dept: MEDICAL GROUP | Facility: IMAGING CENTER | Age: 44
End: 2024-03-21
Payer: COMMERCIAL

## 2024-03-25 ENCOUNTER — OFFICE VISIT (OUTPATIENT)
Dept: MEDICAL GROUP | Facility: IMAGING CENTER | Age: 44
End: 2024-03-25
Payer: COMMERCIAL

## 2024-03-25 VITALS
HEART RATE: 85 BPM | RESPIRATION RATE: 16 BRPM | OXYGEN SATURATION: 97 % | DIASTOLIC BLOOD PRESSURE: 62 MMHG | SYSTOLIC BLOOD PRESSURE: 110 MMHG | WEIGHT: 191 LBS | BODY MASS INDEX: 35.15 KG/M2 | HEIGHT: 62 IN | TEMPERATURE: 97.9 F

## 2024-03-25 DIAGNOSIS — Z02.89 ENCOUNTER FOR COMPLETION OF FORM WITH PATIENT: ICD-10-CM

## 2024-03-25 DIAGNOSIS — R10.13 EPIGASTRIC PAIN: ICD-10-CM

## 2024-03-25 DIAGNOSIS — R53.83 OTHER FATIGUE: ICD-10-CM

## 2024-03-25 DIAGNOSIS — R11.0 NAUSEA: ICD-10-CM

## 2024-03-25 ASSESSMENT — FIBROSIS 4 INDEX: FIB4 SCORE: 0.97

## 2024-03-25 NOTE — PROGRESS NOTES
"Subjective:     CC:   Chief Complaint   Patient presents with    Paperwork     FMLA       HPI:   ynes Dupree, 43 y.o., female,  presents today to discuss:     FMLA: pt is requesting completion of FMLA. She hasn't been able to work since February 20 due to her symptoms (epigastric pain, nausea, and fatigue. She feels ready to go back to work this weekend. Symptoms have improved. She completed labs a week ago at [facility]. Has upcoming appt for abdominal ultrasound.     ROS:  See HPI    Medications, allergies, past medical history, family history, surgical history, and social history documented in chart and reviewed by me.       Objective:   Exam:  /62 (BP Location: Left arm, Patient Position: Sitting, BP Cuff Size: Adult)   Pulse 85   Temp 36.6 °C (97.9 °F) (Temporal)   Resp 16   Ht 1.575 m (5' 2\")   Wt 86.6 kg (191 lb)   LMP  (LMP Unknown) Comment: last week of October  SpO2 97%   BMI 34.93 kg/m²      General: In no acute distress. Normal appearance.   Head:   Atraumatic, normocephalic.   Neck: Supple   Pulmonary: Normal effort, clear to auscultation bilaterally, no wheezes, rhonchi, or rales  Cardiovascular:   Regular rate and rhythm. No murmurs, rubs, or gallops.  Musculoskeletal:  Normal ROM. No edema.    Skin: No visible rashes or lesions.  Neurological: Alert and oriented to person, place, and time. Gait normal.   Psychiatric: Normal mood and affect. Calm and friendly behavior. Speech clear. Normal judgement and insight.         Assessment & Plan:       1. Encounter for completion of form with patient  FMLA completed today as requested by pt to the best of my ability based on the information provided by pt.     2. Nausea  3. Epigastric pain  4. Other fatigue  Acute. Improving. Per pt she completed labs a week ago at [facility]. Will obtain records. Pt will complete abdominal ultrasound. Will await for results. Will r/o hepatitis.   - HEPATITIS PANEL ACUTE(4 COMPONENTS); Future         Return " if symptoms worsen or fail to improve.     Please note that this dictation was created using voice recognition software. I have made every reasonable attempt to correct obvious errors, but I expect that there are errors of grammar and possibly content that I did not discover before finalizing the note.    Tiana Asher PA-C  [facility]

## 2024-03-27 DIAGNOSIS — R10.13 EPIGASTRIC PAIN: ICD-10-CM

## 2024-03-27 DIAGNOSIS — R53.83 FEELING TIRED: ICD-10-CM

## 2024-03-27 DIAGNOSIS — R11.0 NAUSEA: ICD-10-CM

## 2024-03-27 DIAGNOSIS — R53.83 OTHER FATIGUE: ICD-10-CM

## 2024-03-27 DIAGNOSIS — A04.8 H. PYLORI INFECTION: ICD-10-CM

## 2024-03-27 DIAGNOSIS — E61.1 IRON DEFICIENCY: ICD-10-CM

## 2024-03-27 PROBLEM — R74.8 ELEVATED VITAMIN B12 LEVEL: Status: ACTIVE | Noted: 2024-03-27

## 2024-03-27 PROBLEM — R79.89 ELEVATED VITAMIN B12 LEVEL: Status: ACTIVE | Noted: 2024-03-27

## 2024-03-27 RX ORDER — OMEPRAZOLE 20 MG/1
20 CAPSULE, DELAYED RELEASE ORAL 2 TIMES DAILY
Qty: 28 CAPSULE | Refills: 0 | Status: SHIPPED | OUTPATIENT
Start: 2024-03-27

## 2024-03-27 RX ORDER — AMOXICILLIN 500 MG/1
1000 CAPSULE ORAL 2 TIMES DAILY
Qty: 56 CAPSULE | Refills: 0 | Status: SHIPPED | OUTPATIENT
Start: 2024-03-27 | End: 2024-04-10

## 2024-03-27 RX ORDER — FERROUS SULFATE 325(65) MG
325 TABLET ORAL DAILY
Qty: 30 TABLET | Refills: 3 | Status: SHIPPED | OUTPATIENT
Start: 2024-03-27

## 2024-03-27 RX ORDER — CLARITHROMYCIN 500 MG/1
500 TABLET, COATED ORAL 2 TIMES DAILY
Qty: 28 TABLET | Refills: 0 | Status: SHIPPED | OUTPATIENT
Start: 2024-03-27 | End: 2024-04-10

## 2024-04-10 ENCOUNTER — APPOINTMENT (OUTPATIENT)
Dept: RADIOLOGY | Facility: MEDICAL CENTER | Age: 44
End: 2024-04-10
Attending: STUDENT IN AN ORGANIZED HEALTH CARE EDUCATION/TRAINING PROGRAM
Payer: COMMERCIAL

## 2024-04-19 ENCOUNTER — APPOINTMENT (OUTPATIENT)
Dept: PHYSICAL MEDICINE AND REHAB | Facility: MEDICAL CENTER | Age: 44
End: 2024-04-19
Payer: COMMERCIAL
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
## 2024-05-07 ENCOUNTER — HOSPITAL ENCOUNTER (EMERGENCY)
Facility: MEDICAL CENTER | Age: 44
End: 2024-05-08
Attending: STUDENT IN AN ORGANIZED HEALTH CARE EDUCATION/TRAINING PROGRAM
Payer: COMMERCIAL

## 2024-05-07 DIAGNOSIS — M54.50 ACUTE MIDLINE LOW BACK PAIN WITHOUT SCIATICA: ICD-10-CM

## 2024-05-07 DIAGNOSIS — T21.22XA PARTIAL THICKNESS BURN OF ABDOMEN, INITIAL ENCOUNTER: ICD-10-CM

## 2024-05-07 ASSESSMENT — FIBROSIS 4 INDEX: FIB4 SCORE: 0.97

## 2024-05-08 VITALS
TEMPERATURE: 97.7 F | BODY MASS INDEX: 36.47 KG/M2 | WEIGHT: 198.19 LBS | SYSTOLIC BLOOD PRESSURE: 103 MMHG | RESPIRATION RATE: 20 BRPM | HEART RATE: 72 BPM | DIASTOLIC BLOOD PRESSURE: 60 MMHG | OXYGEN SATURATION: 95 % | HEIGHT: 62 IN

## 2024-05-08 RX ORDER — IBUPROFEN 600 MG/1
600 TABLET ORAL ONCE
Status: COMPLETED | OUTPATIENT
Start: 2024-05-08 | End: 2024-05-08

## 2024-05-08 RX ORDER — LIDOCAINE 4 G/G
1 PATCH TOPICAL EVERY 24 HOURS
Status: DISCONTINUED | OUTPATIENT
Start: 2024-05-08 | End: 2024-05-08 | Stop reason: HOSPADM

## 2024-05-08 RX ORDER — ACETAMINOPHEN 500 MG
1000 TABLET ORAL ONCE
Status: COMPLETED | OUTPATIENT
Start: 2024-05-08 | End: 2024-05-08

## 2024-05-08 RX ADMIN — ACETAMINOPHEN 1000 MG: 500 TABLET, FILM COATED ORAL at 00:38

## 2024-05-08 RX ADMIN — IBUPROFEN 600 MG: 600 TABLET, FILM COATED ORAL at 00:39

## 2024-05-08 RX ADMIN — LIDOCAINE 1 PATCH: 4 PATCH TOPICAL at 01:02

## 2024-05-08 RX ADMIN — MUPIROCIN 22 G: 20 OINTMENT TOPICAL at 01:01

## 2024-05-08 NOTE — ED NOTES
Discharge instructions given and discussed, signed copy in chart. Pt verbalized understanding and all questions answered. Copy of prescriptions given to pt. Pt discharged  in stable condition on room air.  Personal belongings given to patient.
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
## 2024-05-08 NOTE — DISCHARGE INSTRUCTIONS
Apply the antibiotic ointment daily, to change your dressings daily, return if you develop severe worsening redness swelling or pain, otherwise follow-up with your primary care doctor for reevaluation in about a week.
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
## 2024-05-21 ENCOUNTER — OFFICE VISIT (OUTPATIENT)
Dept: URGENT CARE | Facility: CLINIC | Age: 44
End: 2024-05-21
Payer: COMMERCIAL

## 2024-05-21 VITALS
HEIGHT: 62 IN | OXYGEN SATURATION: 98 % | BODY MASS INDEX: 36.44 KG/M2 | WEIGHT: 198 LBS | TEMPERATURE: 98.4 F | RESPIRATION RATE: 14 BRPM | HEART RATE: 88 BPM | DIASTOLIC BLOOD PRESSURE: 82 MMHG | SYSTOLIC BLOOD PRESSURE: 124 MMHG

## 2024-05-21 DIAGNOSIS — R52 BODY ACHES: ICD-10-CM

## 2024-05-21 LAB
FLUAV RNA SPEC QL NAA+PROBE: NEGATIVE
FLUBV RNA SPEC QL NAA+PROBE: NEGATIVE
RSV RNA SPEC QL NAA+PROBE: NEGATIVE
SARS-COV-2 RNA RESP QL NAA+PROBE: NEGATIVE

## 2024-05-21 PROCEDURE — 3074F SYST BP LT 130 MM HG: CPT | Performed by: PHYSICIAN ASSISTANT

## 2024-05-21 PROCEDURE — 0241U POCT CEPHEID COV-2, FLU A/B, RSV - PCR: CPT | Performed by: PHYSICIAN ASSISTANT

## 2024-05-21 PROCEDURE — 3079F DIAST BP 80-89 MM HG: CPT | Performed by: PHYSICIAN ASSISTANT

## 2024-05-21 PROCEDURE — 99214 OFFICE O/P EST MOD 30 MIN: CPT | Performed by: PHYSICIAN ASSISTANT

## 2024-05-21 RX ORDER — KETOROLAC TROMETHAMINE 30 MG/ML
30 INJECTION, SOLUTION INTRAMUSCULAR; INTRAVENOUS ONCE
Status: COMPLETED | OUTPATIENT
Start: 2024-05-21 | End: 2024-05-21

## 2024-05-21 RX ADMIN — KETOROLAC TROMETHAMINE 30 MG: 30 INJECTION, SOLUTION INTRAMUSCULAR; INTRAVENOUS at 14:25

## 2024-05-21 ASSESSMENT — ENCOUNTER SYMPTOMS
ABDOMINAL PAIN: 0
CONSTIPATION: 0
DIARRHEA: 0
BACK PAIN: 1
FLANK PAIN: 0
BODY ACHES: 1
COUGH: 0
FEVER: 0
NAUSEA: 0
VOMITING: 0

## 2024-05-21 ASSESSMENT — FIBROSIS 4 INDEX: FIB4 SCORE: 0.97

## 2024-05-21 NOTE — PROGRESS NOTES
Subjective:   Mary Gold is a 43 y.o. female who presents today with   Chief Complaint   Patient presents with    Body Aches     X 1 week/ back pain/ pressure on upper back       Generalized Body Aches  This is a new problem. The current episode started in the past 7 days. The problem occurs constantly. The problem has been unchanged. Pertinent negatives include no abdominal pain, congestion, coughing, fever, nausea or vomiting. She has tried NSAIDs and acetaminophen for the symptoms. The treatment provided no relief.     Patient has history of similar aches and joint pains.  She was seen in physiatry and last saw them in March and states that she was trying to take the medication that they prescribed which appears to be tizanidine at that time.  She states she took it for couple weeks but no significant improvement.  No recent fever or cough.  No recent injury or trauma.  Patient states she has had similar incidents occur a couple of years ago when she had Toradol shot that helped.    PMH:  has a past medical history of Chiari malformation, Diabetes (HCC), Dizziness, Fatigue, Heart burn, Indigestion, Numbness and tingling in hands, Other acute pain, and Unspecified hemorrhagic conditions.    She has no past medical history of COPD or Psychiatric disorder.  MEDS:   Current Outpatient Medications:     omeprazole (PRILOSEC) 20 MG delayed-release capsule, Take 1 Capsule by mouth 2 times a day. (Patient not taking: Reported on 5/21/2024), Disp: 28 Capsule, Rfl: 0    ferrous sulfate 325 (65 Fe) MG tablet, Take 1 Tablet by mouth every day. (Patient not taking: Reported on 5/21/2024), Disp: 30 Tablet, Rfl: 3    gabapentin (NEURONTIN) 300 MG Cap, Take 1 Capsule by mouth at bedtime. (Patient not taking: Reported on 5/21/2024), Disp: 30 Capsule, Rfl: 2    tizanidine (ZANAFLEX) 4 MG Tab, Take 0.5-1 Tablets by mouth at bedtime as needed (muscle spasms). (Patient not taking: Reported on 5/21/2024), Disp: 30 Tablet, Rfl:  "2    ondansetron (ZOFRAN) 4 MG Tab tablet, Take 1 Tablet by mouth every four hours as needed for Nausea/Vomiting. (Patient not taking: Reported on 5/21/2024), Disp: 10 Tablet, Rfl: 0    Current Facility-Administered Medications:     ketorolac (Toradol) injection 30 mg, 30 mg, Intramuscular, Once,   ALLERGIES: No Known Allergies  SURGHX:   Past Surgical History:   Procedure Laterality Date    CRANIECTOMY  8/17/2015    Procedure: SUBOCCIPITAL CRANIECTOMY ;  Surgeon: Tenzin Curtis M.D.;  Location: SURGERY [facility];  Service:     CERVICAL LAMINECTOMY POSTERIOR  8/17/2015    Procedure: CERVICAL LAMINECTOMY POSTERIOR C1, DUROPLASTY;  Surgeon: Tenzin Curtis M.D.;  Location: SURGERY [facility];  Service:     GYN SURGERY      OTHER      eye     SOCHX:  reports that she has never smoked. She has never used smokeless tobacco. She reports that she does not currently use alcohol. She reports that she does not use drugs.  FH: Reviewed with patient, not pertinent to this visit.       Review of Systems   Constitutional:  Negative for fever.   HENT:  Negative for congestion.    Respiratory:  Negative for cough.    Gastrointestinal:  Negative for abdominal pain, constipation, diarrhea, nausea and vomiting.   Genitourinary:  Negative for dysuria, flank pain, frequency, hematuria and urgency.   Musculoskeletal:  Positive for back pain and joint pain.        Objective:   /82   Pulse 88   Temp 36.9 °C (98.4 °F)   Resp 14   Ht 1.575 m (5' 2\")   Wt 89.8 kg (198 lb)   LMP 05/04/2024 Comment: bilateral tubal ligation  SpO2 98%   BMI 36.21 kg/m²   Physical Exam  Vitals and nursing note reviewed.   Constitutional:       General: She is not in acute distress.     Appearance: Normal appearance. She is well-developed. She is not ill-appearing or toxic-appearing.   HENT:      Head: Normocephalic and atraumatic.      Right Ear: Hearing normal.      Left Ear: Hearing normal.   Cardiovascular:      Rate and Rhythm: " Normal rate and regular rhythm.      Heart sounds: Normal heart sounds.   Pulmonary:      Effort: Pulmonary effort is normal.      Breath sounds: Normal breath sounds.   Abdominal:      Tenderness: There is no right CVA tenderness or left CVA tenderness.   Musculoskeletal:      Comments: Normal movement in all 4 extremities.  Patient does have tenderness to palpation throughout the paraspinous area and the entirety of the back as well as multiple joints including the shoulders and knees with light palpation.  No deficits noted and no weakness noted bilaterally.  No erythema or swelling to any of the joints.   Skin:     General: Skin is warm and dry.   Neurological:      Mental Status: She is alert.      Coordination: Coordination normal.   Psychiatric:         Mood and Affect: Mood normal.       COVID -  FLU -  RSV -    Assessment/Plan:   Assessment    1. Body aches  - ketorolac (Toradol) injection 30 mg  - POCT CoV-2, Flu A/B, RSV by PCR    Symptoms and presentation appear consistent with myalgias/possible polyarthralgia with chronic joint pains and back pain.  No acute concerning findings on my exam today otherwise.  Would recommend following up with physiatry/primary care.    Differential diagnosis, natural history, supportive care, and indications for immediate follow-up discussed.   Patient given instructions and understanding of medications and treatment.    If not improving in 3-5 days, F/U with PCP or return to  if symptoms worsen.    Patient agreeable to plan.    Please note that this dictation was created using voice recognition software. I have made every reasonable attempt to correct obvious errors, but I expect that there are errors of grammar and possibly content that I did not discover before finalizing the note.    Inder Mcneil PA-C

## 2024-05-21 NOTE — LETTER
May 21, 2024         Patient: Mary Gold   YOB: 1980   Date of Visit: 5/21/2024           To Whom it May Concern:    Mary Gold was seen in my clinic on 5/21/2024.  Please excuse her absence yesterday and today.    If you have any questions or concerns, please don't hesitate to call.        Sincerely,           Inder Mcneil P.A.-C.  Electronically Signed

## 2024-05-23 ENCOUNTER — OFFICE VISIT (OUTPATIENT)
Dept: NEUROLOGY | Facility: MEDICAL CENTER | Age: 44
End: 2024-05-23
Attending: PSYCHIATRY & NEUROLOGY
Payer: COMMERCIAL

## 2024-05-23 VITALS
TEMPERATURE: 98.4 F | OXYGEN SATURATION: 95 % | HEART RATE: 76 BPM | DIASTOLIC BLOOD PRESSURE: 78 MMHG | HEIGHT: 62 IN | RESPIRATION RATE: 16 BRPM | SYSTOLIC BLOOD PRESSURE: 118 MMHG | BODY MASS INDEX: 36.23 KG/M2 | WEIGHT: 196.87 LBS

## 2024-05-23 DIAGNOSIS — G43.009 MIGRAINE WITHOUT AURA AND WITHOUT STATUS MIGRAINOSUS, NOT INTRACTABLE: Primary | ICD-10-CM

## 2024-05-23 DIAGNOSIS — Z86.69 HISTORY OF CHIARI MALFORMATION: ICD-10-CM

## 2024-05-23 PROCEDURE — 3078F DIAST BP <80 MM HG: CPT | Performed by: PSYCHIATRY & NEUROLOGY

## 2024-05-23 PROCEDURE — 3074F SYST BP LT 130 MM HG: CPT | Performed by: PSYCHIATRY & NEUROLOGY

## 2024-05-23 PROCEDURE — 99205 OFFICE O/P NEW HI 60 MIN: CPT | Performed by: PSYCHIATRY & NEUROLOGY

## 2024-05-23 ASSESSMENT — ENCOUNTER SYMPTOMS
MEMORY LOSS: 0
TINGLING: 1
LOSS OF CONSCIOUSNESS: 0
INSOMNIA: 0
HEADACHES: 1
FALLS: 0
DIZZINESS: 0

## 2024-05-23 ASSESSMENT — FIBROSIS 4 INDEX: FIB4 SCORE: 0.97

## 2024-05-23 NOTE — PROGRESS NOTES
Carlos Gold is a 43 y.o. female who presents from the office of Dr. Kevan Salcedo MD, for consultation, with a history of headaches and persistent whole body paresthesias following occipital craniotomy for an Arnold-Chiari type I malformation in 2015.     LORI Hernandez is a very pleasant 43-year-old right-handed woman who has a history of episodic headaches dating back to her teens, and which have continued to that time.    She denies any prodrome or aura to the headaches.    She describes a daily headache that is typically bitemporal, mild in severity, more of a pressure-like sensation.  It is not associated with any other symptoms.  These have been present for quite some time.  She does not use OTC medications for these.    The severe headaches are typically a worsening of the above, still bitemporal, they are stabbing in quality, incapacitating in severity, and are associated with heightened sensitivities, impaired concentration, and worsening with simple physical activities.  She denies allodynia, autonomic symptoms, and initially there was no neck pain or stiffness though this has become more prominent since her decompressive surgery.    The severe attacks can last for about 2 days which includes the recovery cycle.  They have been occurring once a month since her surgery though that frequency has been increasing prior to.  She has never been able to identify triggers for the severe attacks, they can occur at anytime of day or night, though she does work night shift at [employer].  She has never gotten prescription medications.    She underwent surgery for an Arnold-Chiari type I malformation which was found incidentally when imaging of the brain was done for her headaches.  At the time the headaches were increasing in frequency and severity, she also then began to develop paresthesias that involve the entire body associated with balance difficulties.  The headaches then began to change,  worsening with sudden Valsalva, always occipital or suboccipital in location, and very brief in duration, typically a matter of minutes and no more.  There were none of the heightened sensitivities, etc.    She underwent surgical decompression in August, 2015.  I reviewed images of the CT scans prior to as well as following, the last in February of this year, revealing no extra-axial fluid collections, no acute intracranial pathologies, no evidence of obstructive hydrocephalus or parenchymal abnormalities in the brain itself.    Since surgery the paresthesias did diminish notably but they still happen, they can be present all day when they are there, though they fluctuate in duration.  These typically occur once a week, independent of the severe headaches which are now occurring once a month.  With the sensory distortion she feels very unsteady though she is not falling with regularity.  There is no longer any weakness.  There is no analgesia, she is awake and alert, she denies visual symptoms, etc.    She has a history of headaches described above, her Arnold-Chiari type I malformation, no history of other migraine comorbidities nor diabetes, hypertension, CVA, CAD, PVD, malignancy, liver or kidney disease, blood dyscrasia, autoimmune disease, sleep disorder, IBD, glaucoma, kidney stones, MS, seizures, or neurodegenerative disease.    There is no other surgical history of note.    She is not aware of anyone in the family who has ever suffered from migraine, stroke, MS or seizures.    Her menses are regular, typically once a month with a 4-day duration.  The headaches and the paresthesias she is suffering from are not cyclical with her menses.    She does not smoke or drink.  She works night shift at [employer].  She is presently on no medications.    Review of Systems   Constitutional:  Negative for malaise/fatigue.   Musculoskeletal:  Negative for falls.   Neurological:  Positive for tingling and headaches. Negative  "for dizziness and loss of consciousness.   Psychiatric/Behavioral:  Negative for memory loss. The patient does not have insomnia.    All other systems reviewed and are negative.    Objective     /78 (BP Location: Right arm, Patient Position: Sitting, BP Cuff Size: Adult)   Pulse 76   Temp 36.9 °C (98.4 °F) (Temporal)   Resp 16   Ht 1.575 m (5' 2\")   Wt 89.3 kg (196 lb 13.9 oz)   LMP 05/04/2024 Comment: bilateral tubal ligation  SpO2 95%   BMI 36.01 kg/m²      Physical Exam    She appears in no acute distress.  Her vital signs are stable.  There is no malar rash, temporal or jaw tenderness, or jaw claudication.  There is notable tenderness across the occipital ridge bilaterally, especially medially, but palpation of the occipital notch bilaterally does not elicit pain radiating over the occiput.  There is also tenderness of the cervical paraspinal and trapezius muscle bodies bilaterally.  Carotid pulses are present bilaterally without asymmetry.  Sustained neck extension does not elicit symptoms.  Cardiac evaluation reveals a regular rhythm.     Neurological Exam    She is fully oriented, there is no aphasia, apraxia, or inattention.    PERRLA/EOMI, visual fields are full, facial movements are symmetric, funduscopic exam reveals sharp disc margins bilaterally.  Sensory exam is intact to temperature, pinprick and light touch bilaterally.  The tongue and uvula are midline, jaw movements are intact, there is no bulbar dysfunction.  Shoulder shrug and head rotation are normal.    Musculoskeletal exam reveals normal tone bilaterally, there is no tremor, asterixis, or drift.  Strength is 5/5 throughout.  Reflexes are brisk and present at all points, though diminished at the ankles they are symmetric.  Both toes are downgoing.    She stands easily but is quite cautious as she walks, she has some difficulty with tandem walking, but her station is normal.  Armswing is symmetric.  There is no appendicular " dystaxia throughout.  Fine motor control with repetitive movements is normal in all 4 extremities with amplitude and frequencies symmetric.    Sensory exam is intact to vibration, temperature, pinprick and JPS throughout.  Romberg is absent.    Assessment & Plan     1. Migraine without aura and without status migrainosus, not intractable  I think the headaches she has had since she was younger are migraine, fortunately they are happening with a frequency of maybe once a month, and though I offered rescue medication, she is opting out.  The daily headaches are simply a constant friend that is more of an annoyance than anything else.  Though maintenance therapies might be beneficial in getting rid of not just these minor headaches but even the less common severe attacks, she also was not interested.  She is someone who simply does not like taking medicines.  She was told that these headaches do eventually resolve over time, though this cannot be predicted as to when.  They are not putting her at risk for significant CNS structural pathology.    In the moderate headaches, I recommended taking a high-dose Aleve 660 mg with pain onset, this can be repeated once within 24 hours.  She is quite comfortable using this.    2. History of Chiari malformation  I suspect the paresthesias are likely a residual, secondary effect of the Arnold-Chiari with some brainstem compression.  MRI of the brain does need to be done to rule out underlying sclerotic change.  Her examination is benign otherwise, no evidence of increased ICP, and radiographically there is no sign of malformation recurrence.  Unfortunately the cervical spine needs to be better imaged to rule out a syrinx, hopefully MRI of the brain will be adequate in this regard.  For now we can simply observe, she was told she can engage in physical routines without constraint.  We will contact her with the results of the MRI scan, otherwise we will follow-up in 6 months.    -  MR-BRAIN-WITH & W/O; Future    Time: 60 minutes in total spent on patient care including creatinine charting, record review, discussion with healthcare staff and documentation.  This includes face-to-face time for exam, review, discussion, as well as counseling and coordinating care.

## 2024-06-11 DIAGNOSIS — E11.65 TYPE 2 DIABETES MELLITUS WITH HYPERGLYCEMIA, WITHOUT LONG-TERM CURRENT USE OF INSULIN (HCC): ICD-10-CM

## 2024-08-06 ENCOUNTER — OFFICE VISIT (OUTPATIENT)
Dept: URGENT CARE | Facility: CLINIC | Age: 44
End: 2024-08-06
Payer: COMMERCIAL

## 2024-08-06 VITALS
RESPIRATION RATE: 17 BRPM | BODY MASS INDEX: 36.07 KG/M2 | WEIGHT: 196 LBS | OXYGEN SATURATION: 98 % | TEMPERATURE: 97.6 F | DIASTOLIC BLOOD PRESSURE: 60 MMHG | HEIGHT: 62 IN | SYSTOLIC BLOOD PRESSURE: 102 MMHG | HEART RATE: 77 BPM

## 2024-08-06 DIAGNOSIS — G89.29 CHRONIC BILATERAL LOW BACK PAIN, UNSPECIFIED WHETHER SCIATICA PRESENT: ICD-10-CM

## 2024-08-06 DIAGNOSIS — R19.7 DIARRHEA, UNSPECIFIED TYPE: ICD-10-CM

## 2024-08-06 DIAGNOSIS — M54.50 CHRONIC BILATERAL LOW BACK PAIN, UNSPECIFIED WHETHER SCIATICA PRESENT: ICD-10-CM

## 2024-08-06 DIAGNOSIS — M62.838 TRAPEZIUS MUSCLE SPASM: ICD-10-CM

## 2024-08-06 DIAGNOSIS — R11.0 NAUSEA: ICD-10-CM

## 2024-08-06 PROCEDURE — 3074F SYST BP LT 130 MM HG: CPT | Performed by: PHYSICIAN ASSISTANT

## 2024-08-06 PROCEDURE — 99214 OFFICE O/P EST MOD 30 MIN: CPT | Performed by: PHYSICIAN ASSISTANT

## 2024-08-06 PROCEDURE — 3078F DIAST BP <80 MM HG: CPT | Performed by: PHYSICIAN ASSISTANT

## 2024-08-06 RX ORDER — KETOROLAC TROMETHAMINE 15 MG/ML
15 INJECTION, SOLUTION INTRAMUSCULAR; INTRAVENOUS ONCE
Status: COMPLETED | OUTPATIENT
Start: 2024-08-06 | End: 2024-08-06

## 2024-08-06 RX ORDER — CYCLOBENZAPRINE HCL 10 MG
10 TABLET ORAL 3 TIMES DAILY PRN
Qty: 30 TABLET | Refills: 0 | Status: SHIPPED | OUTPATIENT
Start: 2024-08-06 | End: 2024-08-26

## 2024-08-06 RX ORDER — IBUPROFEN 200 MG
200 TABLET ORAL EVERY 6 HOURS PRN
COMMUNITY
End: 2024-08-26

## 2024-08-06 RX ADMIN — KETOROLAC TROMETHAMINE 15 MG: 15 INJECTION, SOLUTION INTRAMUSCULAR; INTRAVENOUS at 14:01

## 2024-08-06 ASSESSMENT — ENCOUNTER SYMPTOMS
FEVER: 0
MYALGIAS: 1
CHILLS: 0
VOMITING: 0
CONSTIPATION: 0
SORE THROAT: 0
ABDOMINAL PAIN: 0
DIARRHEA: 1
FALLS: 0
COUGH: 0
FLANK PAIN: 0
NAUSEA: 1
HEARTBURN: 0
BLOOD IN STOOL: 0
NECK PAIN: 1
DIZZINESS: 0
HEADACHES: 0
BACK PAIN: 1

## 2024-08-06 ASSESSMENT — FIBROSIS 4 INDEX: FIB4 SCORE: 0.97

## 2024-08-06 NOTE — LETTER
August 6, 2024    To Whom It May Concern:         This is confirmation that Mary Gold attended her scheduled appointment with Simeon Garcia P.A.-C. on 8/06/24.  Please excuse patient's absence from work on 8/5/2024 and 8/6/2024.         If you have any questions please do not hesitate to call me at the phone number listed below.    Sincerely,          Simeon Garcia P.A.-C.  [phone]

## 2024-08-06 NOTE — PROGRESS NOTES
Subjective:     CHIEF COMPLAINT  Chief Complaint   Patient presents with    Back Pain     Patient states lower back, shoulder and neck pain, unknown cause. Patient also states nausea and diarrhea, states the symptoms since Sunday.        LORI Gold is a very pleasant 43 y.o. female who presents to the clinic with 2 separate complaints today.  Patient has been experiencing pain to the upper back and lower back over the last 5 days.  No preceding injury or trauma.  No change in activity.  She does have a history of chronic low back pain.  Currently experiencing pain and stiffness to her neck and shoulders bilaterally.  Experiences tenderness and stiffness over the trapezius muscles.  Occasionally causes a slight headache.  Denies any radicular symptoms to the upper extremities.  Bilateral low back pain is dull and aching without radiculopathy.  No change in bowel or bladder function.  No saddle anesthesia.  Yesterday the patient developed nausea without emesis and is also had diarrhea.  Describes having approximately 4-5 episodes of diarrhea over the last 2 days.  No blood or mucus in the stool.  No associated abdominal pain.  No recent surgery, travel or antibiotic use.  No OTC medications have been started at this time.  Requesting a work note.    REVIEW OF SYSTEMS  Review of Systems   Constitutional:  Negative for chills, fever and malaise/fatigue.   HENT:  Negative for congestion and sore throat.    Respiratory:  Negative for cough.    Gastrointestinal:  Positive for diarrhea and nausea. Negative for abdominal pain, blood in stool, constipation, heartburn, melena and vomiting.   Genitourinary:  Negative for dysuria, flank pain, frequency, hematuria and urgency.   Musculoskeletal:  Positive for back pain, myalgias and neck pain. Negative for falls and joint pain.   Skin:  Negative for rash.   Neurological:  Negative for dizziness and headaches.       PAST MEDICAL HISTORY  Patient Active Problem List     "Diagnosis Date Noted    Migraine without aura and without status migrainosus, not intractable 05/23/2024    Iron deficiency 03/27/2024    Elevated vitamin B12 level 03/27/2024    H. pylori infection 03/27/2024    Nausea 03/20/2024    Epigastric pain 03/20/2024    Paresthesia of hand, bilateral 02/23/2024    Neuropathy 02/23/2024    Acute nonintractable headache 02/23/2024    History of Chiari malformation 02/23/2024    Fatigue 05/28/2022    Hepatomegaly 05/28/2022    Type 2 diabetes mellitus with hyperglycemia, without long-term current use of insulin (HCC) 02/11/2022    Obesity (BMI 30-39.9) 02/11/2022       SURGICAL HISTORY   has a past surgical history that includes other; craniectomy (8/17/2015); cervical laminectomy posterior (8/17/2015); and gyn surgery.    ALLERGIES  No Known Allergies    CURRENT MEDICATIONS  Home Medications       Reviewed by Simeon Garcia P.A.-C. (Physician Assistant) on 08/06/24 at 1347  Med List Status: <None>     Medication Last Dose Status   ibuprofen (MOTRIN) 200 MG Tab Taking Active                    SOCIAL HISTORY  Social History     Tobacco Use    Smoking status: Never    Smokeless tobacco: Never   Vaping Use    Vaping status: Never Used   Substance and Sexual Activity    Alcohol use: Not Currently    Drug use: No    Sexual activity: Not Currently       FAMILY HISTORY  Family History   Problem Relation Age of Onset    Diabetes Mother     Hypertension Mother     Diabetes Maternal Aunt           Objective:     VITAL SIGNS: /60   Pulse 77   Temp 36.4 °C (97.6 °F) (Temporal)   Resp 17   Ht 1.575 m (5' 2\")   Wt 88.9 kg (196 lb)   SpO2 98%   BMI 35.85 kg/m²     PHYSICAL EXAM  Physical Exam  Constitutional:       General: She is not in acute distress.     Appearance: Normal appearance. She is not ill-appearing, toxic-appearing or diaphoretic.   HENT:      Head: Normocephalic and atraumatic.      Mouth/Throat:      Mouth: Mucous membranes are moist.   Eyes:      " Conjunctiva/sclera: Conjunctivae normal.   Neck:      Comments: Cervical exam: No midline tenderness to palpation.  No obvious deformity or step-off.  Tenderness over the bilateral cervical paraspinal muscles and trapezius.  Maintains full cervical range of motion.  Pulmonary:      Effort: Pulmonary effort is normal.   Abdominal:      General: Bowel sounds are normal.      Palpations: Abdomen is soft.      Tenderness: There is no abdominal tenderness. There is no right CVA tenderness, left CVA tenderness, guarding or rebound.   Musculoskeletal:         General: Normal range of motion.      Cervical back: Normal range of motion. No muscular tenderness.      Comments: No midline lumbar tenderness to palpation.  Maintains full lumbar range of motion.  Negative SLRs.   Lymphadenopathy:      Cervical: No cervical adenopathy.   Skin:     General: Skin is warm and dry.   Neurological:      General: No focal deficit present.      Mental Status: She is alert and oriented to person, place, and time. Mental status is at baseline.   Psychiatric:         Mood and Affect: Mood normal.         Thought Content: Thought content normal.         Assessment/Plan:     1. Trapezius muscle spasm  ketorolac (Toradol) 15 MG/ML injection 15 mg    cyclobenzaprine (FLEXERIL) 10 mg Tab      2. Chronic bilateral low back pain, unspecified whether sciatica present  ketorolac (Toradol) 15 MG/ML injection 15 mg    cyclobenzaprine (FLEXERIL) 10 mg Tab      3. Nausea        4. Diarrhea, unspecified type            MDM/Comments:    Patient presents to the clinic today with 2 separate complaints.  Has been experiencing neck and low back pain over the last 5 days.  No preceding injury or trauma.  On exam patient has tenderness palpation over the bilateral trapezius muscles.  No cervical midline tenderness.  No preceding injury or trauma.  We will treat supportively with 15 mg IM Toradol in clinic.  Flexeril provided for muscle spasms.  Ice/heat and  stretching encouraged.    Patient has had diarrhea and slight nausea for the last 2 days.  No associated abdominal pain, fevers, chills or myalgias.  No signs on exam concerning for acute abdomen.  At this time advised supportive care with increase fluids and bland diet.  Offered Zofran and patient respectfully declined.    Differential diagnosis, natural history, supportive care, and indications for immediate follow-up discussed. All questions answered. Patient agrees with the plan of care.    Follow-up as needed if symptoms worsen or fail to improve to PCP, Urgent care or Emergency Room.    I have personally reviewed prior external notes and test results pertinent to today's visit.  I have independently reviewed and interpreted all diagnostics ordered during this urgent care acute visit.   Discussed management options (risks,benefits, and alternatives to treatment). Pt expresses understanding and the treatment plan was agreed upon. Questions were encouraged and answered to pt's satisfaction.    Please note that this dictation was created using voice recognition software. I have made a reasonable attempt to correct obvious errors, but I expect that there are errors of grammar and possibly content that I did not discover before finalizing the note.

## 2024-08-26 ENCOUNTER — HOSPITAL ENCOUNTER (OUTPATIENT)
Facility: MEDICAL CENTER | Age: 44
End: 2024-08-26
Attending: NURSE PRACTITIONER
Payer: COMMERCIAL

## 2024-08-26 ENCOUNTER — OFFICE VISIT (OUTPATIENT)
Dept: URGENT CARE | Facility: CLINIC | Age: 44
End: 2024-08-26
Payer: COMMERCIAL

## 2024-08-26 VITALS
SYSTOLIC BLOOD PRESSURE: 112 MMHG | HEART RATE: 84 BPM | BODY MASS INDEX: 36.53 KG/M2 | WEIGHT: 198.5 LBS | TEMPERATURE: 97 F | RESPIRATION RATE: 12 BRPM | HEIGHT: 62 IN | OXYGEN SATURATION: 98 % | DIASTOLIC BLOOD PRESSURE: 68 MMHG

## 2024-08-26 DIAGNOSIS — R05.1 ACUTE COUGH: ICD-10-CM

## 2024-08-26 DIAGNOSIS — J06.9 VIRAL URI: ICD-10-CM

## 2024-08-26 PROCEDURE — 3078F DIAST BP <80 MM HG: CPT | Performed by: NURSE PRACTITIONER

## 2024-08-26 PROCEDURE — 99213 OFFICE O/P EST LOW 20 MIN: CPT | Performed by: NURSE PRACTITIONER

## 2024-08-26 PROCEDURE — 3074F SYST BP LT 130 MM HG: CPT | Performed by: NURSE PRACTITIONER

## 2024-08-26 PROCEDURE — 0241U HCHG SARS-COV-2 COVID-19 NFCT DS RESP RNA 4 TRGT MIC: CPT

## 2024-08-26 RX ORDER — BENZONATATE 100 MG/1
100 CAPSULE ORAL 3 TIMES DAILY PRN
Qty: 60 CAPSULE | Refills: 0 | Status: SHIPPED | OUTPATIENT
Start: 2024-08-26

## 2024-08-26 ASSESSMENT — ENCOUNTER SYMPTOMS
HEADACHES: 0
NAUSEA: 0
COUGH: 1
FEVER: 1
ABDOMINAL PAIN: 0
WHEEZING: 0
RHINORRHEA: 1
SORE THROAT: 1
CHILLS: 1

## 2024-08-26 ASSESSMENT — FIBROSIS 4 INDEX: FIB4 SCORE: 0.97

## 2024-08-26 NOTE — LETTER
August 26, 2024         Patient: Mary Gold   YOB: 1980   Date of Visit: 8/26/2024           To Whom it May Concern:    Mary Gold was seen in my clinic on 8/26/2024. She may return to work on 8/29/24. Please excuse 8/25-8/29/24.    If you have any questions or concerns, please don't hesitate to call.        Sincerely,           RUDI Ruffin.  Electronically Signed

## 2024-08-27 NOTE — PROGRESS NOTES
"Subjective:   Mary Gold is a 43 y.o. female who presents for Cough (Fatigue, body aches, head ache, diarrhea, sore throat x 3 days)      URI   This is a new problem. Episode onset: 3 days ; daughter ill. The problem has been unchanged. Associated symptoms include congestion, coughing, rhinorrhea and a sore throat. Pertinent negatives include no abdominal pain, ear pain, headaches, nausea, plugged ear sensation or wheezing. She has tried acetaminophen for the symptoms.       Review of Systems   Constitutional:  Positive for chills, fever and malaise/fatigue.   HENT:  Positive for congestion, rhinorrhea and sore throat. Negative for ear pain.    Respiratory:  Positive for cough. Negative for wheezing.    Gastrointestinal:  Negative for abdominal pain and nausea.   Neurological:  Negative for headaches.       Medications:    benzonatate Caps    Allergies: Patient has no known allergies.    Problem List: Mary Gold does not have any pertinent problems on file.    Surgical History:  Past Surgical History:   Procedure Laterality Date    CRANIECTOMY  8/17/2015    Procedure: SUBOCCIPITAL CRANIECTOMY ;  Surgeon: Tenzin Curtis M.D.;  Location: SURGERY [facility];  Service:     CERVICAL LAMINECTOMY POSTERIOR  8/17/2015    Procedure: CERVICAL LAMINECTOMY POSTERIOR C1, DUROPLASTY;  Surgeon: Tenzin Curtis M.D.;  Location: SURGERY [facility];  Service:     GYN SURGERY      OTHER      eye       Past Social Hx: Mary Gold  reports that she has never smoked. She has never used smokeless tobacco. She reports that she does not currently use alcohol. She reports that she does not use drugs.     Past Family Hx:  Mary Gold family history includes Diabetes in her maternal aunt and mother; Hypertension in her mother.     Problem list, medications, and allergies reviewed by myself today in Epic.     Objective:     /68   Pulse 84   Temp 36.1 °C (97 °F) (Temporal)   Resp 12   Ht 1.575 m (5' 2\")   Wt " 90 kg (198 lb 8 oz)   SpO2 98%   BMI 36.31 kg/m²     Physical Exam  Vitals and nursing note reviewed.   Constitutional:       General: She is not in acute distress.     Appearance: She is well-developed.   HENT:      Head: Normocephalic and atraumatic.      Right Ear: External ear normal.      Left Ear: External ear normal.      Nose: Nose normal.      Mouth/Throat:      Mouth: Mucous membranes are moist.      Pharynx: Oropharynx is clear.   Eyes:      Conjunctiva/sclera: Conjunctivae normal.   Cardiovascular:      Rate and Rhythm: Normal rate and regular rhythm.   Pulmonary:      Effort: Pulmonary effort is normal. No respiratory distress.      Breath sounds: Normal breath sounds.   Abdominal:      General: There is no distension.   Musculoskeletal:         General: Normal range of motion.      Cervical back: No rigidity.   Lymphadenopathy:      Cervical: No cervical adenopathy.   Skin:     General: Skin is warm and dry.      Capillary Refill: Capillary refill takes less than 2 seconds.   Neurological:      General: No focal deficit present.      Mental Status: She is alert and oriented to person, place, and time. Mental status is at baseline.      Sensory: No sensory deficit.      Gait: Gait (gait at baseline) normal.      Deep Tendon Reflexes: Reflexes are normal and symmetric.   Psychiatric:         Mood and Affect: Mood normal.         Behavior: Behavior normal.         Judgment: Judgment normal.         Assessment/Plan:     Diagnosis and associated orders:     1. Acute cough  benzonatate (TESSALON) 100 MG Cap    CoV-2, Flu A/B, And RSV by PCR (Cepheid)      2. Viral URI           Comments/MDM:     Viral results will be released in [facility]  Symptomatic and supportive care:   Plenty of oral hydration and rest   Over the counter cough suppressant as directed.  Tylenol or ibuprofen for pain and fever as directed.   Warm salt water gargles for sore throat, soft foods, cool liquids.   Saline nasal spray and  Flonase  Infection control measures at home. Stay away from people, Hand washing, covering sneeze/cough, disinfect surfaces.   Remain home from work, school, and other populated environments.    Overall, the patient is well-appearing. They are not hypoxic, afebrile, and a normal pulmonary exam.                 .    Please note that this dictation was created using voice recognition software. I have made a reasonable attempt to correct obvious errors, but I expect that there are errors of grammar and possibly content that I did not discover before finalizing the note.    This note was electronically signed by Jovan HENLEY.

## 2024-09-05 ENCOUNTER — APPOINTMENT (OUTPATIENT)
Dept: MEDICAL GROUP | Facility: IMAGING CENTER | Age: 44
End: 2024-09-05
Payer: COMMERCIAL

## 2024-09-05 VITALS
RESPIRATION RATE: 16 BRPM | WEIGHT: 198 LBS | HEART RATE: 70 BPM | HEIGHT: 62 IN | TEMPERATURE: 98.6 F | OXYGEN SATURATION: 97 % | BODY MASS INDEX: 36.44 KG/M2 | DIASTOLIC BLOOD PRESSURE: 62 MMHG | SYSTOLIC BLOOD PRESSURE: 118 MMHG

## 2024-09-05 DIAGNOSIS — R19.7 DIARRHEA, UNSPECIFIED TYPE: ICD-10-CM

## 2024-09-05 DIAGNOSIS — J06.9 UPPER RESPIRATORY TRACT INFECTION, UNSPECIFIED TYPE: ICD-10-CM

## 2024-09-05 DIAGNOSIS — Z02.89 ENCOUNTER FOR COMPLETION OF FORM WITH PATIENT: ICD-10-CM

## 2024-09-05 PROCEDURE — 7101 PR PHYSICAL: Performed by: STUDENT IN AN ORGANIZED HEALTH CARE EDUCATION/TRAINING PROGRAM

## 2024-09-05 ASSESSMENT — FIBROSIS 4 INDEX: FIB4 SCORE: 0.97

## 2024-09-05 NOTE — PROGRESS NOTES
"Subjective:     CC:   Chief Complaint   Patient presents with    Paperwork     Fmla for work and patient was seen in urgent care for covid symptoms        HPI:   ynes Dupree, 43 y.o., female,  presents today to discuss:     Paperwork: reports she missed work since 8/25/24 due to URI. She was seen at   on 8/26/24. COVID-19, RSV, and influenza were negative. She was treated as she had COVID-19 and was told to isolated at home until 8/29/24. Her employer is requesting completion of leave of absence.  Reports she still has diarrhea, fatigue, and recently she starting having anxiety. Diarrhea started since the onset of her URI. Denies blood in stools, fever, and chills. Daughter was also sick. Denies recent antibiotic use. Denies recent travel.     ROS:  See HPI    Medications, allergies, past medical history, family history, surgical history, and social history documented in chart and reviewed by me.       Objective:   Exam:  /62   Pulse 70   Temp 37 °C (98.6 °F) (Temporal)   Resp 16   Ht 1.575 m (5' 2\")   Wt 89.8 kg (198 lb)   SpO2 97%   BMI 36.21 kg/m²      Physical Exam  Vitals reviewed.   Constitutional:       General: She is not in acute distress.     Appearance: Normal appearance. She is not ill-appearing or toxic-appearing.   HENT:      Head: Normocephalic and atraumatic.      Nose: No congestion or rhinorrhea.      Mouth/Throat:      Mouth: Mucous membranes are moist.      Pharynx: Oropharynx is clear. No oropharyngeal exudate or posterior oropharyngeal erythema.      Tonsils: No tonsillar exudate.   Eyes:      General: No scleral icterus.        Right eye: No discharge.         Left eye: No discharge.      Extraocular Movements: Extraocular movements intact.      Conjunctiva/sclera: Conjunctivae normal.      Pupils: Pupils are equal, round, and reactive to light.   Neck:      Thyroid: No thyroid mass or thyromegaly.   Cardiovascular:      Rate and Rhythm: Normal rate and regular " rhythm.      Pulses: Normal pulses.      Heart sounds: Normal heart sounds. No murmur heard.  Pulmonary:      Effort: Pulmonary effort is normal. No respiratory distress.      Breath sounds: Normal breath sounds. No wheezing.   Abdominal:      General: Bowel sounds are normal. There is no distension.      Palpations: Abdomen is soft. There is no mass.      Tenderness: There is no abdominal tenderness.   Musculoskeletal:         General: Normal range of motion.      Cervical back: Normal range of motion and neck supple. No tenderness.   Lymphadenopathy:      Cervical: No cervical adenopathy.   Skin:     General: Skin is warm and dry.      Coloration: Skin is not jaundiced.   Neurological:      General: No focal deficit present.      Mental Status: She is alert and oriented to person, place, and time.      Gait: Gait normal.   Psychiatric:         Mood and Affect: Mood normal.         Behavior: Behavior normal.         Thought Content: Thought content normal.         Judgment: Judgment normal.              Assessment & Plan:         1. Encounter for completion of form with patient  Paperwork completed as requested by patient.  See scanned copies under media.    2. Diarrhea, unspecified type  Acute.  Patient was recently seen at urgent care for viral URI.  Point-of-care COVID-19, RSV, and influenza were negative.  Discussed conservative treatment.  Recommend over-the-counter probiotic, fiber supplement such as Metamucil, hydration, and well-balanced meals.  If diarrhea persists recommend stool testing.  - Complete O&P; Future  - C Diff by PCR rflx Toxin; Future  - GIARDIA: DIRECT EIA    3. Upper respiratory tract infection, unspecified type  Acute and stable.  Discussed conservative treatment.  Informed patient to return to clinic if symptoms do not resolve.    Pt agreed with treatment plan and verbalized understanding.       Return if symptoms worsen or fail to improve.     Please note that this dictation was created  using voice recognition software. I have made every reasonable attempt to correct obvious errors, but I expect that there are errors of grammar and possibly content that I did not discover before finalizing the note.    Tiana Asher PA-C  [facility]

## 2024-09-15 ENCOUNTER — OFFICE VISIT (OUTPATIENT)
Dept: URGENT CARE | Facility: CLINIC | Age: 44
End: 2024-09-15
Payer: COMMERCIAL

## 2024-09-15 VITALS
OXYGEN SATURATION: 96 % | WEIGHT: 200.7 LBS | BODY MASS INDEX: 36.93 KG/M2 | SYSTOLIC BLOOD PRESSURE: 106 MMHG | RESPIRATION RATE: 14 BRPM | TEMPERATURE: 97.1 F | HEART RATE: 88 BPM | DIASTOLIC BLOOD PRESSURE: 68 MMHG | HEIGHT: 62 IN

## 2024-09-15 DIAGNOSIS — J02.9 SORE THROAT: ICD-10-CM

## 2024-09-15 DIAGNOSIS — B34.9 NONSPECIFIC SYNDROME SUGGESTIVE OF VIRAL ILLNESS: ICD-10-CM

## 2024-09-15 LAB
FLUAV RNA SPEC QL NAA+PROBE: NEGATIVE
FLUBV RNA SPEC QL NAA+PROBE: NEGATIVE
RSV RNA SPEC QL NAA+PROBE: NEGATIVE
S PYO DNA SPEC NAA+PROBE: NOT DETECTED
SARS-COV-2 RNA RESP QL NAA+PROBE: NEGATIVE

## 2024-09-15 PROCEDURE — 0241U POCT CEPHEID COV-2, FLU A/B, RSV - PCR: CPT | Performed by: PHYSICIAN ASSISTANT

## 2024-09-15 PROCEDURE — 3074F SYST BP LT 130 MM HG: CPT | Performed by: PHYSICIAN ASSISTANT

## 2024-09-15 PROCEDURE — 99213 OFFICE O/P EST LOW 20 MIN: CPT | Performed by: PHYSICIAN ASSISTANT

## 2024-09-15 PROCEDURE — 3078F DIAST BP <80 MM HG: CPT | Performed by: PHYSICIAN ASSISTANT

## 2024-09-15 PROCEDURE — 87651 STREP A DNA AMP PROBE: CPT | Performed by: PHYSICIAN ASSISTANT

## 2024-09-15 RX ORDER — DEXTROMETHORPHAN HYDROBROMIDE AND PROMETHAZINE HYDROCHLORIDE 15; 6.25 MG/5ML; MG/5ML
5 SYRUP ORAL EVERY 4 HOURS PRN
Qty: 120 ML | Refills: 0 | Status: SHIPPED | OUTPATIENT
Start: 2024-09-15

## 2024-09-15 RX ORDER — LIDOCAINE HYDROCHLORIDE 20 MG/ML
5 SOLUTION OROPHARYNGEAL 4 TIMES DAILY PRN
Qty: 100 ML | Refills: 0 | Status: SHIPPED | OUTPATIENT
Start: 2024-09-15

## 2024-09-15 ASSESSMENT — ENCOUNTER SYMPTOMS
COUGH: 1
HOARSE VOICE: 1
HEADACHES: 1
SWOLLEN GLANDS: 1
TROUBLE SWALLOWING: 0

## 2024-09-15 ASSESSMENT — FIBROSIS 4 INDEX: FIB4 SCORE: 0.97

## 2024-09-15 NOTE — LETTER
[facility]  [address] [address]     September 15, 2024    Patient: Mary Gold   YOB: 1980   Date of Visit: 9/15/2024       To Whom It May Concern:    Mary Gold was seen and treated in our department on 9/15/2024.  Please excuse her from work on 9/15 through 9/17/2024.    Sincerely,     Roscoe Mahajan P.A.-C.

## 2024-09-16 NOTE — RESULT ENCOUNTER NOTE
Norbert Hernandez,    Your testing for strep throat, covid, flu and RSV is negative. I do think that your illness is viral. I will send in some medications for help you manage the symptoms. I hope that you feel better soon!    Kind regards,  Roscoe

## 2024-09-16 NOTE — PROGRESS NOTES
Subjective:   Mary Gold is a 43 y.o. female who presents for Head Ache (Sore throat, fatigue, x 2 days)        Pharyngitis   This is a new problem. The problem has been gradually worsening. Neither side of throat is experiencing more pain than the other. There has been no fever. The pain is moderate. Associated symptoms include coughing, headaches, a hoarse voice and swollen glands. Pertinent negatives include no congestion, drooling, ear pain or trouble swallowing. Associated symptoms comments: Pain with swallowing. She has had no exposure to strep or mono. She has tried NSAIDs for the symptoms. The treatment provided mild relief.     Review of Systems   HENT:  Positive for hoarse voice. Negative for congestion, drooling, ear pain and trouble swallowing.    Respiratory:  Positive for cough.    Neurological:  Positive for headaches.       PMH:  has a past medical history of Chiari malformation, Diabetes (HCC), Dizziness, Fatigue, Heart burn, Indigestion, Numbness and tingling in hands, Other acute pain, and Unspecified hemorrhagic conditions.    She has no past medical history of COPD or Psychiatric disorder.  MEDS:   Current Outpatient Medications:     lidocaine (XYLOCAINE) 2 % Solution, Take 5 mL by mouth 4 times a day as needed for Throat/Mouth Pain., Disp: 100 mL, Rfl: 0    promethazine-dextromethorphan (PROMETHAZINE-DM) 6.25-15 MG/5ML syrup, Take 5 mL by mouth every four hours as needed for Cough., Disp: 120 mL, Rfl: 0    benzonatate (TESSALON) 100 MG Cap, Take 1 Capsule by mouth 3 times a day as needed for Cough. (Patient not taking: Reported on 9/15/2024), Disp: 60 Capsule, Rfl: 0  ALLERGIES: No Known Allergies  SURGHX:   Past Surgical History:   Procedure Laterality Date    CRANIECTOMY  8/17/2015    Procedure: SUBOCCIPITAL CRANIECTOMY ;  Surgeon: Tenzin Curtis M.D.;  Location: SURGERY [facility];  Service:     CERVICAL LAMINECTOMY POSTERIOR  8/17/2015    Procedure: CERVICAL LAMINECTOMY POSTERIOR  "C1, DUROPLASTY;  Surgeon: Tenzin Curtis M.D.;  Location: SURGERY [facility];  Service:     GYN SURGERY      OTHER      eye     SOCHX:  reports that she has never smoked. She has never used smokeless tobacco. She reports that she does not currently use alcohol. She reports that she does not use drugs.  FH: Family history was reviewed, no pertinent findings to report   Objective:   /68   Pulse 88   Temp 36.2 °C (97.1 °F) (Temporal)   Resp 14   Ht 1.575 m (5' 2\")   Wt 91 kg (200 lb 11.2 oz)   SpO2 96%   BMI 36.71 kg/m²   Physical Exam  Vitals reviewed.   Constitutional:       General: She is not in acute distress.     Appearance: Normal appearance. She is well-developed. She is not toxic-appearing.   HENT:      Head: Normocephalic and atraumatic.      Right Ear: External ear normal.      Left Ear: External ear normal.      Nose: Nose normal. No congestion.      Mouth/Throat:      Lips: Pink.      Mouth: Mucous membranes are moist.      Pharynx: Oropharynx is clear. Uvula midline. Posterior oropharyngeal erythema present. No oropharyngeal exudate or uvula swelling.   Cardiovascular:      Rate and Rhythm: Normal rate and regular rhythm.      Heart sounds: Normal heart sounds, S1 normal and S2 normal.   Pulmonary:      Effort: Pulmonary effort is normal. No respiratory distress.      Breath sounds: Normal breath sounds. No stridor. No decreased breath sounds, wheezing, rhonchi or rales.   Lymphadenopathy:      Cervical: Cervical adenopathy present.      Right cervical: Superficial cervical adenopathy present. No posterior cervical adenopathy.     Left cervical: Superficial cervical adenopathy present. No posterior cervical adenopathy.   Skin:     General: Skin is dry.   Neurological:      Comments: Alert and oriented.    Psychiatric:         Speech: Speech normal.         Behavior: Behavior normal.           Results for orders placed or performed in visit on 09/15/24   POCT GROUP A STREP, PCR   Result " Value Ref Range    POC Group A Strep, PCR Not Detected Not Detected, Invalid   POCT CoV-2, Flu A/B, RSV by PCR   Result Value Ref Range    SARS-CoV-2 by PCR Negative Negative, Invalid    Influenza virus A RNA Negative Negative, Invalid    Influenza virus B, PCR Negative Negative, Invalid    RSV, PCR Negative Negative, Invalid       Assessment/Plan:   1. Sore throat  - POCT GROUP A STREP, PCR  - POCT CoV-2, Flu A/B, RSV by PCR  - lidocaine (XYLOCAINE) 2 % Solution; Take 5 mL by mouth 4 times a day as needed for Throat/Mouth Pain.  Dispense: 100 mL; Refill: 0    2. Nonspecific syndrome suggestive of viral illness  - promethazine-dextromethorphan (PROMETHAZINE-DM) 6.25-15 MG/5ML syrup; Take 5 mL by mouth every four hours as needed for Cough.  Dispense: 120 mL; Refill: 0    Considerations include but not limited to viral URI, sinusitis, allergic rhinitis, influenza, COVID-19, group A strep.  Testing for COVID-19, influenza, RSV, group A strep are negative.  No evidence of lower respiratory involvement on exam today.      Recommend symptomatic care.  12-hour Mucinex or Mucinex D as needed for congestion.  May also perform nasal saline rinses 2-3 times a day and begin Flonase daily.  Recommend salt water gargles, lozenges, hot tea with honey, and ibuprofen as needed for sore throat.  Tylenol or ibuprofen as needed for fever control, body aches, and headaches.  Ensure adequate rest and hydration.    If symptoms fail to improve within 72 hours, new symptoms develop, symptoms worsen return to clinic or see PCP for reevaluation.    If patient experiences chest pain, pain with breathing, shortness of breath, difficulty speaking in full sentences, severe weakness or dizziness they should call 911 and go to the nearest emergency department for further evaluation.

## 2024-09-26 ENCOUNTER — OFFICE VISIT (OUTPATIENT)
Dept: MEDICAL GROUP | Facility: IMAGING CENTER | Age: 44
End: 2024-09-26
Payer: COMMERCIAL

## 2024-09-26 VITALS
DIASTOLIC BLOOD PRESSURE: 60 MMHG | OXYGEN SATURATION: 95 % | SYSTOLIC BLOOD PRESSURE: 112 MMHG | HEIGHT: 62 IN | HEART RATE: 78 BPM | RESPIRATION RATE: 16 BRPM | WEIGHT: 200.6 LBS | TEMPERATURE: 97.7 F | BODY MASS INDEX: 36.91 KG/M2

## 2024-09-26 DIAGNOSIS — R14.0 BLOATING: ICD-10-CM

## 2024-09-26 DIAGNOSIS — K21.9 GASTROESOPHAGEAL REFLUX DISEASE, UNSPECIFIED WHETHER ESOPHAGITIS PRESENT: ICD-10-CM

## 2024-09-26 DIAGNOSIS — J06.9 UPPER RESPIRATORY TRACT INFECTION, UNSPECIFIED TYPE: ICD-10-CM

## 2024-09-26 DIAGNOSIS — Z02.89 ENCOUNTER FOR COMPLETION OF FORM WITH PATIENT: ICD-10-CM

## 2024-09-26 DIAGNOSIS — E11.65 TYPE 2 DIABETES MELLITUS WITH HYPERGLYCEMIA, WITHOUT LONG-TERM CURRENT USE OF INSULIN (HCC): ICD-10-CM

## 2024-09-26 PROCEDURE — 99214 OFFICE O/P EST MOD 30 MIN: CPT | Performed by: STUDENT IN AN ORGANIZED HEALTH CARE EDUCATION/TRAINING PROGRAM

## 2024-09-26 PROCEDURE — 7101 PR PHYSICAL: Performed by: STUDENT IN AN ORGANIZED HEALTH CARE EDUCATION/TRAINING PROGRAM

## 2024-09-26 RX ORDER — METHYLPREDNISOLONE 4 MG
TABLET, DOSE PACK ORAL
Qty: 21 TABLET | Refills: 0 | Status: SHIPPED | OUTPATIENT
Start: 2024-09-26

## 2024-09-26 ASSESSMENT — FIBROSIS 4 INDEX: FIB4 SCORE: 0.97

## 2024-09-26 NOTE — PROGRESS NOTES
"Subjective:     CC: No chief complaint on file.      HPI:   ynes Dupree, 43 y.o., female,  presents today to discuss:     Paperwork: pt was seen at  on 9/15/24 for URI. Reports she is still having body aches. She felt dizzy yesterday and had to leave work early. She has chronic dizziness.  She has upcoming appt for brain MRI. She will schedule appt with neurology after completing brain MRI. She sill has the cough and sore throat. Denies diarrhea, nausea, vomiting, chills, and fever. Denies sick contacts. Denies recent travel outside of the country. She missed work 9/15/24 to 9/17/24.     Also reports she is experiencing a lot more acid reflux and bloating.  She was recently treated for H. pylori but she did not repeat H. pylori test to check for eradication.  Denies taking antacid medications.  Denies triggers.    ROS:  See HPI    Medications, allergies, past medical history, family history, surgical history, and social history documented in chart and reviewed by me.       Objective:   Exam:  /60   Pulse 78   Temp 36.5 °C (97.7 °F) (Temporal)   Resp 16   Ht 1.575 m (5' 2\")   Wt 91 kg (200 lb 9.6 oz)   SpO2 95%   BMI 36.69 kg/m²      Physical Exam  Vitals reviewed.   Constitutional:       General: She is not in acute distress.     Appearance: Normal appearance. She is not ill-appearing.   HENT:      Head: Normocephalic and atraumatic. No masses.      Right Ear: Tympanic membrane, ear canal and external ear normal.      Left Ear: Tympanic membrane, ear canal and external ear normal.      Nose: Nose normal. No congestion.      Mouth/Throat:      Mouth: Mucous membranes are moist.      Pharynx: Oropharynx is clear. Uvula midline. No pharyngeal swelling, oropharyngeal exudate or posterior oropharyngeal erythema.      Tonsils: No tonsillar exudate.   Eyes:      General: No scleral icterus.     Extraocular Movements: Extraocular movements intact.      Conjunctiva/sclera: Conjunctivae normal.     "  Pupils: Pupils are equal, round, and reactive to light.   Neck:      Thyroid: No thyroid mass or thyromegaly.      Vascular: No carotid bruit.   Cardiovascular:      Rate and Rhythm: Normal rate and regular rhythm.      Pulses: Normal pulses.      Heart sounds: Normal heart sounds. No murmur heard.  Pulmonary:      Effort: Pulmonary effort is normal. No respiratory distress.      Breath sounds: Normal breath sounds. No wheezing.   Abdominal:      General: Bowel sounds are normal. There is no distension.      Palpations: Abdomen is soft. There is no mass.      Tenderness: There is no abdominal tenderness. There is no guarding.      Hernia: No hernia is present.   Musculoskeletal:         General: No swelling, tenderness or deformity. Normal range of motion.      Cervical back: Normal range of motion and neck supple. No rigidity or tenderness.      Right lower leg: No edema.      Left lower leg: No edema.   Lymphadenopathy:      Cervical: No cervical adenopathy.   Skin:     General: Skin is warm and dry.      Coloration: Skin is not jaundiced.      Findings: No bruising, erythema, lesion or rash.   Neurological:      General: No focal deficit present.      Mental Status: She is alert and oriented to person, place, and time.      Cranial Nerves: Cranial nerves 2-12 are intact.      Sensory: No sensory deficit.      Motor: No weakness.      Gait: Gait normal.   Psychiatric:         Mood and Affect: Mood normal.         Behavior: Behavior normal.         Thought Content: Thought content normal.         Judgment: Judgment normal.              Assessment & Plan:     1. Encounter for completion of form with patient  FMLA completed today as requested by pt . See scanned copies under media.     2. Upper respiratory tract infection, unspecified type  Acute.  Exam is normal.  Vital signs are normal.  Recommend influenza and COVID-19 vaccines at the pharmacy after her symptoms resolved.  Will trial Medrol Dosepak to help  relieve inflammation.  Recommend close blood sugar monitoring and discontinue steroids if blood sugar spikes.  - methylPREDNISolone (MEDROL DOSEPAK) 4 MG Tablet Therapy Pack; As directed on the packaging label.  Dispense: 21 Tablet; Refill: 0    3. Bloating  Acute.  New problem.  Will repeat H Pylori since she was treated for H pylori but she did not repeat H pylori test. Referred to gastroenterology for further evaluation.  - H. PYLORI BREATH TEST  - Referral to Gastroenterology    4. Gastroesophageal reflux disease, unspecified whether esophagitis present  Chronic and ongoing.  Discussed lifestyle modifications.  Will repeat H. pylori test.  Refer to dermatology for endoscopy.  - H. PYLORI BREATH TEST  - Referral to Gastroenterology  Some things you can do to help prevent symptoms are:  Avoid foods that make your symptoms worse such as coffee, chocolate, alcohol, peppermint, and fatty foods.   Stop smoking, if you smoke.   Avoid late meals and Lying down with a full stomach. Plan meals for at least 2 to 3 hours before bedtime.   Avoid tight clothing because it can squeeze your stomach and make your symptoms worse. Losing weight could help too.   Raising the head of your bed by 6 to 8 inches, you can do this by using a foam wedge under the mattress.   If your symptoms do not improve or worsen or you develop new symptoms such as trouble swallowing, unintentional weight loss, chest pain, choking sensation while eating or drinking, vomiting blood, or have bowel movements that are red, black, or look like tar to to the emergency room immediately     5. Type 2 diabetes mellitus with hyperglycemia, without long-term current use of insulin (HCC)  Chronic and well-controlled.  Most recent A1c was 5.8 2/2024.  Will repeat A1c.  Reminded patient to complete the tests that were ordered previously: Urinalysis, microalbumin to creatinine ratio, vitamin D, and lipid panel.  - HEMOGLOBIN A1C; Future         Diagnosis and  treatment plan explained to pt. Counseled pt on new medication(s) and potential side effects. Pt agreed with treatment plan and verbalized understanding.     Return in about 1 month (around 10/26/2024) for pap smear/lab results.     Please note that this dictation was created using voice recognition software. I have made every reasonable attempt to correct obvious errors, but I expect that there are errors of grammar and possibly content that I did not discover before finalizing the note.    Tiana Asher PA-C  [facility]

## 2024-09-30 ENCOUNTER — APPOINTMENT (OUTPATIENT)
Dept: MEDICAL GROUP | Facility: IMAGING CENTER | Age: 44
End: 2024-09-30
Payer: COMMERCIAL

## 2024-10-09 ENCOUNTER — OFFICE VISIT (OUTPATIENT)
Dept: MEDICAL GROUP | Facility: IMAGING CENTER | Age: 44
End: 2024-10-09
Payer: COMMERCIAL

## 2024-10-09 VITALS
TEMPERATURE: 97.2 F | HEART RATE: 82 BPM | HEIGHT: 62 IN | OXYGEN SATURATION: 98 % | DIASTOLIC BLOOD PRESSURE: 68 MMHG | RESPIRATION RATE: 16 BRPM | WEIGHT: 196 LBS | BODY MASS INDEX: 36.07 KG/M2 | SYSTOLIC BLOOD PRESSURE: 116 MMHG

## 2024-10-09 DIAGNOSIS — J06.9 UPPER RESPIRATORY TRACT INFECTION, UNSPECIFIED TYPE: ICD-10-CM

## 2024-10-09 PROCEDURE — 99213 OFFICE O/P EST LOW 20 MIN: CPT | Performed by: STUDENT IN AN ORGANIZED HEALTH CARE EDUCATION/TRAINING PROGRAM

## 2024-10-09 PROCEDURE — 3074F SYST BP LT 130 MM HG: CPT | Performed by: STUDENT IN AN ORGANIZED HEALTH CARE EDUCATION/TRAINING PROGRAM

## 2024-10-09 PROCEDURE — 3078F DIAST BP <80 MM HG: CPT | Performed by: STUDENT IN AN ORGANIZED HEALTH CARE EDUCATION/TRAINING PROGRAM

## 2024-10-09 PROCEDURE — 0241U POCT CEPHEID COV-2, FLU A/B, RSV - PCR: CPT | Performed by: STUDENT IN AN ORGANIZED HEALTH CARE EDUCATION/TRAINING PROGRAM

## 2024-10-09 ASSESSMENT — FIBROSIS 4 INDEX: FIB4 SCORE: 0.97

## 2024-10-31 ENCOUNTER — OFFICE VISIT (OUTPATIENT)
Dept: MEDICAL GROUP | Facility: IMAGING CENTER | Age: 44
End: 2024-10-31
Payer: COMMERCIAL

## 2024-10-31 VITALS
WEIGHT: 197 LBS | HEIGHT: 62 IN | BODY MASS INDEX: 36.25 KG/M2 | DIASTOLIC BLOOD PRESSURE: 72 MMHG | HEART RATE: 75 BPM | TEMPERATURE: 97.5 F | SYSTOLIC BLOOD PRESSURE: 126 MMHG | OXYGEN SATURATION: 98 % | RESPIRATION RATE: 16 BRPM

## 2024-10-31 DIAGNOSIS — Z02.89 ENCOUNTER FOR COMPLETION OF FORM WITH PATIENT: ICD-10-CM

## 2024-10-31 ASSESSMENT — FIBROSIS 4 INDEX: FIB4 SCORE: 0.97

## 2024-12-19 ENCOUNTER — APPOINTMENT (OUTPATIENT)
Dept: MEDICAL GROUP | Facility: IMAGING CENTER | Age: 44
End: 2024-12-19
Payer: COMMERCIAL

## 2025-01-05 ENCOUNTER — OFFICE VISIT (OUTPATIENT)
Dept: URGENT CARE | Facility: CLINIC | Age: 45
End: 2025-01-05
Payer: COMMERCIAL

## 2025-01-05 VITALS
OXYGEN SATURATION: 97 % | HEIGHT: 62 IN | RESPIRATION RATE: 18 BRPM | BODY MASS INDEX: 35.04 KG/M2 | SYSTOLIC BLOOD PRESSURE: 108 MMHG | TEMPERATURE: 97 F | WEIGHT: 190.4 LBS | DIASTOLIC BLOOD PRESSURE: 60 MMHG | HEART RATE: 87 BPM

## 2025-01-05 DIAGNOSIS — J22 LRTI (LOWER RESPIRATORY TRACT INFECTION): ICD-10-CM

## 2025-01-05 DIAGNOSIS — R05.1 ACUTE COUGH: ICD-10-CM

## 2025-01-05 PROCEDURE — 3074F SYST BP LT 130 MM HG: CPT | Performed by: NURSE PRACTITIONER

## 2025-01-05 PROCEDURE — 3078F DIAST BP <80 MM HG: CPT | Performed by: NURSE PRACTITIONER

## 2025-01-05 PROCEDURE — 99213 OFFICE O/P EST LOW 20 MIN: CPT | Performed by: NURSE PRACTITIONER

## 2025-01-05 RX ORDER — BENZONATATE 100 MG/1
100 CAPSULE ORAL 3 TIMES DAILY PRN
Qty: 60 CAPSULE | Refills: 0 | Status: SHIPPED | OUTPATIENT
Start: 2025-01-05 | End: 2025-01-16

## 2025-01-05 RX ORDER — ALBUTEROL SULFATE 90 UG/1
2 INHALANT RESPIRATORY (INHALATION) EVERY 6 HOURS PRN
Qty: 8.5 G | Refills: 0 | Status: SHIPPED | OUTPATIENT
Start: 2025-01-05 | End: 2025-01-16

## 2025-01-05 RX ORDER — AZITHROMYCIN 250 MG/1
TABLET, FILM COATED ORAL
Qty: 6 TABLET | Refills: 0 | Status: SHIPPED | OUTPATIENT
Start: 2025-01-05 | End: 2025-01-16

## 2025-01-05 RX ORDER — METHYLPREDNISOLONE 4 MG/1
TABLET ORAL
Qty: 21 TABLET | Refills: 0 | Status: SHIPPED | OUTPATIENT
Start: 2025-01-05 | End: 2025-01-14

## 2025-01-05 ASSESSMENT — ENCOUNTER SYMPTOMS
SORE THROAT: 0
COUGH: 1
RHINORRHEA: 1
FEVER: 1
CHILLS: 1
SHORTNESS OF BREATH: 0
NAUSEA: 0
WHEEZING: 0
HEADACHES: 1

## 2025-01-05 ASSESSMENT — FIBROSIS 4 INDEX: FIB4 SCORE: 0.99

## 2025-01-05 NOTE — LETTER
January 5, 2025         Patient: Mary Gold   YOB: 1980   Date of Visit: 1/5/2025           To Whom it May Concern:    Mary Gold was seen in my clinic on 1/5/2025. She may return to work on 1/8/25.    If you have any questions or concerns, please don't hesitate to call.        Sincerely,           RUDI Ruffin.  Electronically Signed

## 2025-01-06 NOTE — PROGRESS NOTES
Subjective:   Mary Gold is a 44 y.o. female who presents for Cough (X5 days: Cough, affecting sleep, body aches, headaches, no appetite.)      URI   This is a new problem. Episode onset: 5 days. The problem has been gradually worsening. Associated symptoms include congestion, coughing, headaches and rhinorrhea. Pertinent negatives include no nausea, plugged ear sensation, sore throat or wheezing. She has tried acetaminophen for the symptoms. The treatment provided no relief.       Review of Systems   Constitutional:  Positive for chills, fever and malaise/fatigue.   HENT:  Positive for congestion and rhinorrhea. Negative for sore throat.    Respiratory:  Positive for cough. Negative for shortness of breath and wheezing.    Gastrointestinal:  Negative for nausea.   Neurological:  Positive for headaches.       Medications:    This patient does not have an active medication from one of the medication groupers.    Allergies: Patient has no known allergies.    Problem List: Mary Gold does not have any pertinent problems on file.    Surgical History:  Past Surgical History:   Procedure Laterality Date    CRANIECTOMY  8/17/2015    Procedure: SUBOCCIPITAL CRANIECTOMY ;  Surgeon: Tenzin Curtis M.D.;  Location: SURGERY [facility];  Service:     CERVICAL LAMINECTOMY POSTERIOR  8/17/2015    Procedure: CERVICAL LAMINECTOMY POSTERIOR C1, DUROPLASTY;  Surgeon: Tenzin Curtis M.D.;  Location: SURGERY [facility];  Service:     GYN SURGERY      OTHER      eye       Past Social Hx: Mary Gold  reports that she has never smoked. She has never used smokeless tobacco. She reports that she does not currently use alcohol. She reports that she does not use drugs.     Past Family Hx:  Mary Gold family history includes Diabetes in her maternal aunt and mother; Hypertension in her mother.     Problem list, medications, and allergies reviewed by myself today in Epic.     Objective:     /60   Pulse 87    "Temp 36.1 °C (97 °F)   Resp 18   Ht 1.575 m (5' 2\")   Wt 86.4 kg (190 lb 6.4 oz)   SpO2 97%   BMI 34.82 kg/m²     Physical Exam  Vitals and nursing note reviewed.   Constitutional:       General: She is not in acute distress.     Appearance: She is well-developed.   HENT:      Head: Normocephalic and atraumatic.      Right Ear: Tympanic membrane and external ear normal.      Left Ear: Tympanic membrane and external ear normal.      Nose: Nose normal.      Right Sinus: No maxillary sinus tenderness or frontal sinus tenderness.      Left Sinus: No maxillary sinus tenderness or frontal sinus tenderness.      Mouth/Throat:      Mouth: Mucous membranes are moist.      Pharynx: Uvula midline. No posterior oropharyngeal erythema.      Tonsils: No tonsillar exudate or tonsillar abscesses.   Eyes:      General:         Right eye: No discharge.         Left eye: No discharge.      Conjunctiva/sclera: Conjunctivae normal.   Cardiovascular:      Rate and Rhythm: Normal rate.   Pulmonary:      Effort: Pulmonary effort is normal. No respiratory distress.      Breath sounds: Wheezing present.   Abdominal:      General: There is no distension.   Musculoskeletal:         General: Normal range of motion.   Skin:     General: Skin is warm and dry.   Neurological:      General: No focal deficit present.      Mental Status: She is alert and oriented to person, place, and time. Mental status is at baseline.      Gait: Gait (gait at baseline) normal.   Psychiatric:         Judgment: Judgment normal.         Assessment/Plan:     Diagnosis and associated orders:     1. LRTI (lower respiratory tract infection)  methylPREDNISolone (MEDROL DOSEPAK) 4 MG Tablet Therapy Pack    benzonatate (TESSALON) 100 MG Cap    albuterol 108 (90 Base) MCG/ACT Aero Soln inhalation aerosol    azithromycin (ZITHROMAX) 250 MG Tab      2. Acute cough  methylPREDNISolone (MEDROL DOSEPAK) 4 MG Tablet Therapy Pack    benzonatate (TESSALON) 100 MG Cap    " albuterol 108 (90 Base) MCG/ACT Aero Soln inhalation aerosol    azithromycin (ZITHROMAX) 250 MG Tab           Comments/MDM:   I personally reviewed prior external notes and prior test results pertinent to today's visit. Contingent antibiotic prescription given to patient to fill upon meeting criteria of guidelines discussed.   Discussed management options, risks and benefits, and alternatives to treatment plan agreed upon.   Red flags discussed and indications to immediately call 911 or present to the Emergency Department.   Supportive care, differential diagnoses, and indications for immediate follow-up discussed with patient.    Patient expresses understanding and agrees to plan. Patient denies any other questions or concerns.                      Please note that this dictation was created using voice recognition software. I have made a reasonable attempt to correct obvious errors, but I expect that there are errors of grammar and possibly content that I did not discover before finalizing the note.    This note was electronically signed by Jovan HENLEY.

## 2025-01-14 ENCOUNTER — OFFICE VISIT (OUTPATIENT)
Dept: URGENT CARE | Facility: CLINIC | Age: 45
End: 2025-01-14
Payer: COMMERCIAL

## 2025-01-14 VITALS
BODY MASS INDEX: 37.34 KG/M2 | HEIGHT: 62 IN | OXYGEN SATURATION: 98 % | WEIGHT: 202.9 LBS | HEART RATE: 75 BPM | SYSTOLIC BLOOD PRESSURE: 100 MMHG | RESPIRATION RATE: 15 BRPM | DIASTOLIC BLOOD PRESSURE: 68 MMHG | TEMPERATURE: 97.8 F

## 2025-01-14 DIAGNOSIS — R51.9 ACUTE NONINTRACTABLE HEADACHE, UNSPECIFIED HEADACHE TYPE: ICD-10-CM

## 2025-01-14 PROCEDURE — 99214 OFFICE O/P EST MOD 30 MIN: CPT | Performed by: PHYSICIAN ASSISTANT

## 2025-01-14 PROCEDURE — 3074F SYST BP LT 130 MM HG: CPT | Performed by: PHYSICIAN ASSISTANT

## 2025-01-14 PROCEDURE — 3078F DIAST BP <80 MM HG: CPT | Performed by: PHYSICIAN ASSISTANT

## 2025-01-14 RX ORDER — IBUPROFEN 600 MG/1
600 TABLET, FILM COATED ORAL EVERY 6 HOURS PRN
Qty: 30 TABLET | Refills: 0 | Status: SHIPPED | OUTPATIENT
Start: 2025-01-14 | End: 2025-01-16

## 2025-01-14 RX ORDER — KETOROLAC TROMETHAMINE 15 MG/ML
15 INJECTION, SOLUTION INTRAMUSCULAR; INTRAVENOUS
Qty: 1 ML | Refills: 0 | Status: SHIPPED | OUTPATIENT
Start: 2025-01-14 | End: 2025-01-16

## 2025-01-14 RX ORDER — DEXAMETHASONE SODIUM PHOSPHATE 10 MG/ML
10 INJECTION INTRAMUSCULAR; INTRAVENOUS ONCE
Status: COMPLETED | OUTPATIENT
Start: 2025-01-14 | End: 2025-01-14

## 2025-01-14 RX ADMIN — DEXAMETHASONE SODIUM PHOSPHATE 10 MG: 10 INJECTION INTRAMUSCULAR; INTRAVENOUS at 16:32

## 2025-01-14 ASSESSMENT — FIBROSIS 4 INDEX: FIB4 SCORE: 0.99

## 2025-01-14 NOTE — LETTER
January 14, 2025    To Whom It May Concern:         This is confirmation that Mary Gold attended her scheduled appointment with Roscoe Mahajan P.A.-C. on 1/14/25.  She may return to work on 1/15/2025.         If you have any questions please do not hesitate to call me at the phone number listed below.    Sincerely,          Roscoe Mahajan P.A.-C.  [phone]

## 2025-01-15 NOTE — PROGRESS NOTES
Subjective:   Mary Gold is a 44 y.o. female who presents for Congestion (Patient is here today for congestion and headaches. Patient states that she has had symptoms for 2 days)        Patient presents with concerns of headache that started yesterday show she was at work.  She had to leave work due to her symptoms.  She rested and took Tylenol and while this helped this did not completely resolve her symptoms.  She currently has a mild generalized headache.  States that she was seen in clinic last week for cough and lower respiratory symptoms.  She was treated with a Medrol Dosepak and azithromycin.  Her cough is not completely resolved, but overall her symptoms have significantly improved.  No motor or sensory changes, thunderclap headache, nausea, vomiting, vision changes.  The last medication she took for headache was Tylenol this morning.        ROS    PMH:  has a past medical history of Chiari malformation, Diabetes (HCC), Dizziness, Fatigue, Heart burn, Indigestion, Numbness and tingling in hands, Other acute pain, and Unspecified hemorrhagic conditions.    She has no past medical history of COPD or Psychiatric disorder.  MEDS:   Current Outpatient Medications:     ketorolac (TORADOL) 15 MG/ML injection, Infuse 1 mL into a venous catheter one time as needed for Moderate Pain for up to 1 dose., Disp: 1 mL, Rfl: 0    ibuprofen (MOTRIN) 600 MG Tab, Take 1 Tablet by mouth every 6 hours as needed for Moderate Pain or Headache., Disp: 30 Tablet, Rfl: 0    benzonatate (TESSALON) 100 MG Cap, Take 1 Capsule by mouth 3 times a day as needed for Cough. (Patient not taking: Reported on 1/14/2025), Disp: 60 Capsule, Rfl: 0    albuterol 108 (90 Base) MCG/ACT Aero Soln inhalation aerosol, Inhale 2 Puffs every 6 hours as needed for Shortness of Breath. (Patient not taking: Reported on 1/14/2025), Disp: 8.5 g, Rfl: 0    azithromycin (ZITHROMAX) 250 MG Tab, Take 2 tablets by mouth on day one. Take one tablet by mouth the  "remaining days until gone (Patient not taking: Reported on 1/14/2025), Disp: 6 Tablet, Rfl: 0    Current Facility-Administered Medications:     dexamethasone (Decadron) injection (check route below) 10 mg, 10 mg, Oral, Once,   ALLERGIES: No Known Allergies  SURGHX:   Past Surgical History:   Procedure Laterality Date    CRANIECTOMY  8/17/2015    Procedure: SUBOCCIPITAL CRANIECTOMY ;  Surgeon: Tenzin Curtis M.D.;  Location: SURGERY [facility];  Service:     CERVICAL LAMINECTOMY POSTERIOR  8/17/2015    Procedure: CERVICAL LAMINECTOMY POSTERIOR C1, DUROPLASTY;  Surgeon: Tenzin Curtis M.D.;  Location: SURGERY [facility];  Service:     GYN SURGERY      OTHER      eye     SOCHX:  reports that she has never smoked. She has never used smokeless tobacco. She reports that she does not currently use alcohol. She reports that she does not use drugs.  FH: Family history was reviewed, no pertinent findings to report   Objective:   /68   Pulse 75   Temp 36.6 °C (97.8 °F) (Temporal)   Resp 15   Ht 1.575 m (5' 2\")   Wt 92 kg (202 lb 14.4 oz)   SpO2 98%   BMI 37.11 kg/m²   Physical Exam  Vitals reviewed.   Constitutional:       General: She is not in acute distress.     Appearance: Normal appearance. She is well-developed. She is not toxic-appearing.   HENT:      Head: Normocephalic and atraumatic.      Right Ear: External ear normal.      Left Ear: External ear normal.      Nose: Nose normal.   Cardiovascular:      Rate and Rhythm: Normal rate and regular rhythm.      Heart sounds: Normal heart sounds, S1 normal and S2 normal.   Pulmonary:      Effort: Pulmonary effort is normal. No respiratory distress.      Breath sounds: Normal breath sounds. No stridor. No decreased breath sounds, wheezing, rhonchi or rales.      Comments: Occasional dry cough.  Skin:     General: Skin is dry.   Neurological:      Mental Status: She is alert and oriented to person, place, and time.      GCS: GCS eye subscore is 4. GCS " verbal subscore is 5. GCS motor subscore is 6.      Cranial Nerves: Cranial nerves 2-12 are intact.      Sensory: Sensation is intact.      Motor: Motor function is intact.      Coordination: Coordination is intact.      Gait: Gait is intact.   Psychiatric:         Speech: Speech normal.         Behavior: Behavior normal.           Assessment/Plan:   1. Acute nonintractable headache, unspecified headache type  - ketorolac (TORADOL) 15 MG/ML injection; Infuse 1 mL into a venous catheter one time as needed for Moderate Pain for up to 1 dose.  Dispense: 1 mL; Refill: 0  - dexamethasone (Decadron) injection (check route below) 10 mg  - ibuprofen (MOTRIN) 600 MG Tab; Take 1 Tablet by mouth every 6 hours as needed for Moderate Pain or Headache.  Dispense: 30 Tablet; Refill: 0    Course: Toradol 15 mg IM and Decadron 10 mg p.o. administered in clinic.      Patient well-appearing and vital signs stable.  No red flag symptoms.  Recommend that patient continue to rest and ensure adequate hydration.  If medication ministered in clinic fail to fully resolve symptoms or symptoms recur she may take ibuprofen 600 mg p.o. after 6 to 8 hours.  Recommend immediate reevaluation with any new or worsening symptoms.  Red flag signs and symptoms-to ED.

## 2025-01-16 ENCOUNTER — OFFICE VISIT (OUTPATIENT)
Dept: URGENT CARE | Facility: CLINIC | Age: 45
End: 2025-01-16
Payer: COMMERCIAL

## 2025-01-16 VITALS
BODY MASS INDEX: 37.06 KG/M2 | OXYGEN SATURATION: 97 % | HEART RATE: 84 BPM | WEIGHT: 201.4 LBS | HEIGHT: 62 IN | DIASTOLIC BLOOD PRESSURE: 64 MMHG | TEMPERATURE: 97.5 F | RESPIRATION RATE: 14 BRPM | SYSTOLIC BLOOD PRESSURE: 104 MMHG

## 2025-01-16 DIAGNOSIS — J01.00 ACUTE NON-RECURRENT MAXILLARY SINUSITIS: ICD-10-CM

## 2025-01-16 PROCEDURE — 99214 OFFICE O/P EST MOD 30 MIN: CPT | Performed by: PHYSICIAN ASSISTANT

## 2025-01-16 PROCEDURE — 3074F SYST BP LT 130 MM HG: CPT | Performed by: PHYSICIAN ASSISTANT

## 2025-01-16 PROCEDURE — 3078F DIAST BP <80 MM HG: CPT | Performed by: PHYSICIAN ASSISTANT

## 2025-01-16 ASSESSMENT — ENCOUNTER SYMPTOMS
HOARSE VOICE: 0
FEVER: 0
WHEEZING: 0
COUGH: 1
SHORTNESS OF BREATH: 0
SINUS PRESSURE: 1
CHILLS: 0
MYALGIAS: 0
GASTROINTESTINAL NEGATIVE: 1
DIZZINESS: 0
CARDIOVASCULAR NEGATIVE: 1
SWOLLEN GLANDS: 0
HEADACHES: 1
SORE THROAT: 1

## 2025-01-16 ASSESSMENT — FIBROSIS 4 INDEX: FIB4 SCORE: 0.99

## 2025-01-16 NOTE — LETTER
January 16, 2025         Patient: Mary Gold   YOB: 1980   Date of Visit: 1/16/2025           To Whom it May Concern:    Mary Gold was seen in my clinic on 1/16/2025. Please excuse absences on 1/15. She may return to work on 1/19.    If you have any questions or concerns, please don't hesitate to call.        Sincerely,           Allen Rice P.A.-C.  Electronically Signed

## 2025-01-17 NOTE — PROGRESS NOTES
Subjective     Mary Gold is a very pleasant 44 y.o. female who presents with Cough (Cough, sore throat, fatigue, body aches, chill, headache, sinus pressure x 16 days )            Third visit to urgent care.    Sinus Problem  This is a new problem. The current episode started 1 to 4 weeks ago. The problem has been gradually worsening since onset. There has been no fever. The fever has been present for Less than 1 day. Associated symptoms include congestion, coughing, headaches, sinus pressure, sneezing and a sore throat. Pertinent negatives include no chills, ear pain, hoarse voice, shortness of breath or swollen glands. Past treatments include acetaminophen. The treatment provided no relief.         PMH:  has a past medical history of Chiari malformation, Diabetes (HCC), Dizziness, Fatigue, Heart burn, Indigestion, Numbness and tingling in hands, Other acute pain, and Unspecified hemorrhagic conditions.    She has no past medical history of COPD or Psychiatric disorder.  MEDS:   Current Outpatient Medications:     ketorolac (TORADOL) 15 MG/ML injection, Infuse 1 mL into a venous catheter one time as needed for Moderate Pain for up to 1 dose. (Patient not taking: Reported on 1/16/2025), Disp: 1 mL, Rfl: 0    ibuprofen (MOTRIN) 600 MG Tab, Take 1 Tablet by mouth every 6 hours as needed for Moderate Pain or Headache. (Patient not taking: Reported on 1/16/2025), Disp: 30 Tablet, Rfl: 0    benzonatate (TESSALON) 100 MG Cap, Take 1 Capsule by mouth 3 times a day as needed for Cough. (Patient not taking: Reported on 1/16/2025), Disp: 60 Capsule, Rfl: 0    albuterol 108 (90 Base) MCG/ACT Aero Soln inhalation aerosol, Inhale 2 Puffs every 6 hours as needed for Shortness of Breath. (Patient not taking: Reported on 1/16/2025), Disp: 8.5 g, Rfl: 0    azithromycin (ZITHROMAX) 250 MG Tab, Take 2 tablets by mouth on day one. Take one tablet by mouth the remaining days until gone (Patient not taking: Reported on 1/16/2025),  "Disp: 6 Tablet, Rfl: 0  ALLERGIES: No Known Allergies  SURGHX:   Past Surgical History:   Procedure Laterality Date    CRANIECTOMY  8/17/2015    Procedure: SUBOCCIPITAL CRANIECTOMY ;  Surgeon: Tenzin Curtis M.D.;  Location: SURGERY [facility];  Service:     CERVICAL LAMINECTOMY POSTERIOR  8/17/2015    Procedure: CERVICAL LAMINECTOMY POSTERIOR C1, DUROPLASTY;  Surgeon: Tenzin Curtis M.D.;  Location: SURGERY [facility];  Service:     GYN SURGERY      OTHER      eye     SOCHX:  reports that she has never smoked. She has never used smokeless tobacco. She reports that she does not currently use alcohol. She reports that she does not use drugs.  FH: family history includes Diabetes in her maternal aunt and mother; Hypertension in her mother.      Review of Systems   Constitutional:  Positive for malaise/fatigue. Negative for chills and fever.   HENT:  Positive for congestion, sinus pressure, sneezing and sore throat. Negative for ear pain and hoarse voice.    Respiratory:  Positive for cough. Negative for shortness of breath and wheezing.    Cardiovascular: Negative.    Gastrointestinal: Negative.    Genitourinary: Negative.    Musculoskeletal:  Negative for myalgias.   Neurological:  Positive for headaches. Negative for dizziness.       Medications, Allergies, and current problem list reviewed today in Epic             Objective     /64   Pulse 84   Temp 36.4 °C (97.5 °F)   Resp 14   Ht 1.575 m (5' 2\")   Wt 91.4 kg (201 lb 6.4 oz)   SpO2 97%   BMI 36.84 kg/m²      Physical Exam  Vitals and nursing note reviewed.   Constitutional:       General: She is not in acute distress.     Appearance: Normal appearance. She is well-developed. She is not ill-appearing, toxic-appearing or diaphoretic.   HENT:      Head: Normocephalic and atraumatic.      Right Ear: Hearing, tympanic membrane, ear canal and external ear normal. No decreased hearing noted. Tympanic membrane is not erythematous.      Left Ear: " Hearing, tympanic membrane, ear canal and external ear normal. No decreased hearing noted. Tympanic membrane is not erythematous.      Nose: Mucosal edema, congestion and rhinorrhea present. Rhinorrhea is purulent.      Right Turbinates: Swollen.      Left Turbinates: Swollen.      Right Sinus: Maxillary sinus tenderness present.      Left Sinus: Maxillary sinus tenderness present.      Mouth/Throat:      Mouth: Mucous membranes are moist.      Dentition: Normal dentition.      Pharynx: Posterior oropharyngeal erythema present. No oropharyngeal exudate.   Eyes:      General: No scleral icterus.        Right eye: No discharge.         Left eye: No discharge.      Conjunctiva/sclera: Conjunctivae normal.   Cardiovascular:      Rate and Rhythm: Normal rate and regular rhythm.      Pulses: Normal pulses.      Heart sounds: Normal heart sounds. No murmur heard.  Pulmonary:      Effort: Pulmonary effort is normal. No respiratory distress.      Breath sounds: Normal breath sounds. No wheezing, rhonchi or rales.   Musculoskeletal:      Cervical back: Normal range of motion and neck supple.   Lymphadenopathy:      Cervical: No cervical adenopathy.   Skin:     General: Skin is warm and dry.      Nails: There is no clubbing.   Neurological:      General: No focal deficit present.      Mental Status: She is alert and oriented to person, place, and time. Mental status is at baseline.   Psychiatric:         Mood and Affect: Mood normal.         Behavior: Behavior normal.         Thought Content: Thought content normal.         Judgment: Judgment normal.                             Assessment & Plan        Assessment & Plan  Acute non-recurrent maxillary sinusitis  This is a very pleasant 44-year-old female had URI/sinus symptoms for the last 3 weeks.  Initially seen and diagnosed with bronchitis that she was having more cough, shortness of breath and wheezing.  She did take all medications and noted significant improvement.   However her sinusitis symptoms have continued to worsen. pO2 is adequate and her lungs are clear showing no lower respiratory involvement.  She does have sinusitis symptoms on exam.  Treating for bacterial etiology based on duration of symptoms.    Take full course of antibiotic  Tylenol and Motrin for fever and pain  OTC meds as discussed including oral decongestant if tolerated  Increase fluids and rest  Nasal spray, nasal rinse/wash, blanca pot      Orders:    amoxicillin-clavulanate (AUGMENTIN) 875-125 MG Tab; Take 1 Tablet by mouth 2 times a day.              I personally reviewed prior external notes and test results pertinent to today's visit. Return to clinic or go to ED if symptoms worsen or persist. Red flag symptoms and indications for ED discussed at length. Patient/Parent/Guardian voices understanding.  AVS with post-visit instructions printed and provided or given verbally.  Follow-up with your primary care provider in 3-5 days. All side effects and potential interactions of prescribed medication discussed including allergic response, GI upset, tendon injury, rash, sedation, OCP effectiveness, etc.    Please note that this dictation was created using voice recognition software. I have made every reasonable attempt to correct obvious errors, but I expect that there are errors of grammar and possibly content that I did not discover before finalizing the note.

## 2025-01-31 ENCOUNTER — APPOINTMENT (OUTPATIENT)
Dept: MEDICAL GROUP | Facility: IMAGING CENTER | Age: 45
End: 2025-01-31
Payer: COMMERCIAL

## 2025-02-12 NOTE — PROGRESS NOTES
"Subjective:      Mary Gold is a 38 y.o. female who presents with Cough (cough, chest pain from coughing, headache from coughing, x6days getting worse// also needs a doctor note)        Reviewed past medical, surgical and family history. Reviewed prescription and OTC medications with patient in electronic health record today      No Known Allergies      Cough   This is a new problem. The current episode started in the past 7 days. The problem has been gradually worsening. The problem occurs constantly. The cough is productive of purulent sputum. Associated symptoms include chills, headaches, nasal congestion and a sore throat. Pertinent negatives include no chest pain, ear congestion, ear pain, fever, heartburn, myalgias, postnasal drip, rash, rhinorrhea, shortness of breath, sweats, weight loss or wheezing. Nothing aggravates the symptoms. She has tried OTC cough suppressant and cool air for the symptoms. The treatment provided mild relief. There is no history of asthma, bronchitis, emphysema, environmental allergies or pneumonia.       Review of Systems   Constitutional: Positive for chills. Negative for fever and weight loss.   HENT: Positive for sore throat. Negative for ear pain, postnasal drip and rhinorrhea.    Respiratory: Positive for cough. Negative for shortness of breath and wheezing.    Cardiovascular: Negative for chest pain.   Gastrointestinal: Negative for heartburn.   Musculoskeletal: Negative for myalgias.   Skin: Negative for rash.   Neurological: Positive for headaches.   Endo/Heme/Allergies: Negative for environmental allergies.          Objective:     /70   Pulse 88   Temp 36.4 °C (97.5 °F) (Temporal)   Resp 13   Ht 1.575 m (5' 2\")   Wt 79.4 kg (175 lb)   SpO2 95%   BMI 32.01 kg/m²      Physical Exam   Constitutional: She is oriented to person, place, and time. Vital signs are normal. She appears well-developed and well-nourished.  Non-toxic appearance. She does not have a " [de-identified] :  	 EXAM: [id number] - CT NECK SOFT TISSUE IC  - ORDERED BY: BETH SHARPE   PROCEDURE DATE:  02/05/2025    INTERPRETATION:  CT EXAMINATION OF THE NECK  CLINICAL INDICATION: Pharyngeal cyst.  TECHNIQUE: Multidetector reformatted CT images of the neck were obtained following the intravenous administration of .  COMPARISON: None available.  FINDINGS:  Aerodigestive structures: Normal.  No evidence of abnormal enhancement.  Thyroid gland: Normal.  Parotid and submandibular glands:  Normal.  Lymph nodes: No pathologic adenopathy by imaging size criteria.  Vascular structures: Normal.  Osseous structures: No fracture, dislocation or destructive lesion.  Paranasal sinuses: Clear.  Mastoid air cells:  Clear.  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  Orbits: Globes and orbits are unremarkable. Mild elevation of the optic chiasm by the suprasellar portion of the tumor.  Partially visualized lung apices: Aberrant right subclavian artery.   IMPRESSION:  Cystic pharyngeal lesion described on the clinical note is not well appreciated on this exam.  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 She does not appear ill. No distress.   HENT:   Head: Normocephalic.   Right Ear: Hearing, external ear and ear canal normal. Tympanic membrane is injected. Tympanic membrane is not erythematous. No middle ear effusion.   Left Ear: Hearing, external ear and ear canal normal. Tympanic membrane is injected. Tympanic membrane is not erythematous.  No middle ear effusion.   Nose: Rhinorrhea present. No mucosal edema. Right sinus exhibits no maxillary sinus tenderness and no frontal sinus tenderness. Left sinus exhibits no maxillary sinus tenderness and no frontal sinus tenderness.   Mouth/Throat: Uvula is midline. Posterior oropharyngeal erythema present. No oropharyngeal exudate, posterior oropharyngeal edema or tonsillar abscesses.   Eyes: Pupils are equal, round, and reactive to light.   Neck: Trachea normal, normal range of motion and full passive range of motion without pain. Neck supple.   Cardiovascular: Normal rate and regular rhythm. PMI is not displaced.   Pulmonary/Chest: Effort normal. No accessory muscle usage. No respiratory distress. She has no decreased breath sounds. She has no wheezes. She has rhonchi (scattered t/o - harsh bronchial breath sounds). She has no rales.   Abdominal: Soft. Normal appearance. There is no tenderness.   Musculoskeletal: Normal range of motion.   Lymphadenopathy:     She has no cervical adenopathy.        Right: No supraclavicular adenopathy present.        Left: No supraclavicular adenopathy present.   Neurological: She is alert and oriented to person, place, and time. She has normal strength. Gait normal.   Skin: Skin is warm and dry. Capillary refill takes less than 2 seconds.   Psychiatric: She has a normal mood and affect. Her speech is normal and behavior is normal. Judgment and thought content normal.   Nursing note and vitals reviewed.              Assessment/Plan:     1. Acute respiratory infection  azithromycin (ZITHROMAX) 250 MG Tab   2. Cough   benzonatate (TESSALON) 200 MG capsule     Educated in proper administration of medication(s) ordered today including safety, possible SE, risks, benefits, rationale and alternatives to therapy.     Return to clinic or PCP  4-5 days if current symptoms are not resolving in a satisfactory manner or sooner if new or worsening symptoms occur. Differential diagnosis, natural history, supportive care, and indications for immediate follow-up discussed at length.   Patient was advised of signs and symptoms which would warrant further evaluation and /or emergent evaluation in ER.  Verbalized agreement with this treatment plan and seemed to understand without barriers. Questions were encouraged and answered to patients satisfaction.     Keep well hydrated  Increase rest      Advised not to drink ETOH, drive car or operate machinery while taking narcotic RX . Verbalizes understanding of safety instructions. Narc Check verified. [place]  Aware web site evaluation: I have obtained and reviewed patient utilization report from [facility] pharmacy database prior to writing prescription for controlled substance II, III or IV per [place] bill . Opioid Risk Tool screen completed.  I believe the benefits of controlled substance therapy outweigh the risks. The reasons for prescribing controlled substances include non-narcotic, oral analgesic alternatives have been inadequate for pain control. Accordingly, I have discussed the risk and benefits, treatment plan, and alternative therapies with the patient.

## 2025-02-17 ENCOUNTER — RESULTS FOLLOW-UP (OUTPATIENT)
Dept: URGENT CARE | Facility: CLINIC | Age: 45
End: 2025-02-17

## 2025-02-17 ENCOUNTER — OFFICE VISIT (OUTPATIENT)
Dept: URGENT CARE | Facility: CLINIC | Age: 45
End: 2025-02-17
Payer: COMMERCIAL

## 2025-02-17 VITALS
DIASTOLIC BLOOD PRESSURE: 76 MMHG | BODY MASS INDEX: 36.99 KG/M2 | TEMPERATURE: 97.2 F | OXYGEN SATURATION: 96 % | WEIGHT: 201 LBS | HEIGHT: 62 IN | SYSTOLIC BLOOD PRESSURE: 112 MMHG | HEART RATE: 88 BPM | RESPIRATION RATE: 16 BRPM

## 2025-02-17 DIAGNOSIS — R68.89 FLU-LIKE SYMPTOMS: ICD-10-CM

## 2025-02-17 DIAGNOSIS — R19.7 DIARRHEA, UNSPECIFIED TYPE: ICD-10-CM

## 2025-02-17 DIAGNOSIS — R53.83 OTHER FATIGUE: ICD-10-CM

## 2025-02-17 DIAGNOSIS — R68.83 CHILLS: ICD-10-CM

## 2025-02-17 PROCEDURE — 0241U POCT CEPHEID COV-2, FLU A/B, RSV - PCR: CPT | Performed by: NURSE PRACTITIONER

## 2025-02-17 PROCEDURE — 99213 OFFICE O/P EST LOW 20 MIN: CPT | Performed by: NURSE PRACTITIONER

## 2025-02-17 PROCEDURE — 3074F SYST BP LT 130 MM HG: CPT | Performed by: NURSE PRACTITIONER

## 2025-02-17 PROCEDURE — 3078F DIAST BP <80 MM HG: CPT | Performed by: NURSE PRACTITIONER

## 2025-02-17 ASSESSMENT — FIBROSIS 4 INDEX: FIB4 SCORE: 0.99

## 2025-02-17 NOTE — LETTER
February 17, 2025       Patient: Mary Gold   YOB: 1980   Date of Visit: 2/17/2025         To Whom It May Concern:    In my medical opinion, I recommend that Mary Gold be excused from work from 2/16/2025 through 2/18/2025 due to illness.     If you have any questions or concerns, please don't hesitate to call [phone]          Sincerely,          [email or web address] [email or web address].  Electronically Signed

## 2025-02-18 NOTE — PROGRESS NOTES
Verbal consent was acquired by the patient to use [employer] ambient listening note generation during this visit          Chief Complaint   Patient presents with    Diarrhea     Fatigue, headaches, chills, x 4 days           History of Present Illness  The patient is a 44-year-old female who presents for evaluation of fatigue, constipation, diarrhea, and epigastric pain.    She has been experiencing malaise for the past 4 days, characterized by fatigue and excessive sleepiness. She is uncertain about the presence of fever due to the lack of a thermometer at home. She reports significant chills but no nausea. She also mentions mild congestion. She has been unable to work since yesterday and does not anticipate being able to do so today. She has been bedridden throughout the morning due to severe fatigue.    She expresses concern over the similarity of her current symptoms to those experienced last year when she was diagnosed with H. pylori. These symptoms included initial constipation followed by diarrhea, a pattern that has recurred in recent days. She also reports intermittent epigastric pain, which occasionally disrupts her eating habits. She describes a burning sensation after meals. She does not take omeprazole or any other medication for her epigastric pain. She recalls being prescribed an antibiotic for her H. pylori infection last year but does not remember the specific medication. She underwent laboratory testing at [facility], which confirmed the presence of H. pylori.    Supplemental Information  She is not currently taking vitamin B12 supplements.    MEDICATIONS  Discontinued: vitamin B12         ROS:    No severe shortness of breath   No cardiac like chest pain, as discussed   As otherwise stated in HPI    Medical/SX/ Social History:  Reviewed per chart    Pertinent Medications:    Current Outpatient Medications on File Prior to Visit   Medication Sig Dispense Refill    amoxicillin-clavulanate  (AUGMENTIN) 875-125 MG Tab Take 1 Tablet by mouth 2 times a day. (Patient not taking: Reported on 2/17/2025) 14 Tablet 0     No current facility-administered medications on file prior to visit.        Allergies:    Patient has no known allergies.     Problem list, medications, and allergies reviewed by myself today in Epic     Physical Exam:    Vitals:    02/17/25 1533   BP: 112/76   Pulse: 88   Resp: 16   Temp: 36.2 °C (97.2 °F)   SpO2: 96%             Physical Exam  Vitals and nursing note reviewed.   Constitutional:       General: She is not in acute distress.     Appearance: Normal appearance. She is ill-appearing. She is not toxic-appearing or diaphoretic.   HENT:      Head: Normocephalic and atraumatic.      Right Ear: Tympanic membrane, ear canal and external ear normal.      Left Ear: Tympanic membrane, ear canal and external ear normal.      Nose: Nose normal.      Mouth/Throat:      Mouth: Mucous membranes are moist.      Pharynx: Oropharynx is clear.   Eyes:      Extraocular Movements: Extraocular movements intact.      Conjunctiva/sclera: Conjunctivae normal.      Pupils: Pupils are equal, round, and reactive to light.   Cardiovascular:      Rate and Rhythm: Normal rate and regular rhythm.      Pulses: Normal pulses.      Heart sounds: Normal heart sounds.   Pulmonary:      Effort: Pulmonary effort is normal.      Breath sounds: Normal breath sounds.   Musculoskeletal:         General: Normal range of motion.      Cervical back: Normal range of motion and neck supple.   Skin:     General: Skin is warm.      Capillary Refill: Capillary refill takes less than 2 seconds.   Neurological:      General: No focal deficit present.      Mental Status: She is alert and oriented to person, place, and time.            Diagnostics:    Results for orders placed or performed in visit on 02/17/25   POCT CoV-2, Flu A/B, RSV by PCR    Collection Time: 02/17/25  4:08 PM   Result Value Ref Range    SARS-CoV-2 by PCR  Negative Negative, Invalid    Influenza virus A RNA Negative Negative, Invalid    Influenza virus B, PCR Negative Negative, Invalid    RSV, PCR Negative Negative, Invalid         Medical Decision making and plan :  I personally reviewed prior external notes and test results pertinent to today's visit. Pt is clinically stable at today's acute urgent care visit.  Patient appears nontoxic with no acute distress noted. Appropriate for outpatient care at this time.    Pleasant 44 y.o. female presented clinic with:     Assessment & Plan  1. Viral illness.  Suspect patient symptoms are viral in etiology, recommend supportive care. Increased fluids and rest.  Recommend over-the-counter cold and flu medications and Tylenol and/or ibuprofen for symptomatic relief and fevers.  Discussed use of nedi-pot, humidifier, and Flonase nasal spray as well.  Discussed good hand hygiene and ways to decrease spread of disease.  Follow-up with PCP return for reevaluation if symptoms persist/worsen.  Patient offered discharge instructions regarding viral illness.  The patient demonstrated a good understanding and agreed with the treatment plan.      2. Potential recurrence of H. pylori infection.  Given her history of H. pylori infection and similar symptoms, there is a possibility of a recurrent infection. If the influenza test is negative, she will be advised to contact her primary care physician to initiate testing for H. pylori, either through a breath test or stool sample. She has been informed that treatment for H. pylori can not be initiated without confirmation of the diagnosis.  She verbalizes understanding and will contact her PCP for further workup regarding potential H. Pylori infection workup.  A work excuse note has been provided for her absence from work from 02/16/2024 to 02/18/2024.      Shared decision-making was utilized with patient for treatment plan. Medication discussed included indication for use and the potential  benefits and side effects. Education was provided regarding the aforementioned assessments.  Differential Diagnosis, natural history, and supportive care discussed. All of the patient's questions were answered to their satisfaction at the time of discharge. Patient was encouraged to monitor symptoms closely. Those signs and symptoms which would warrant concern and mandate seeking a higher level of service through the emergency department discussed at length.  Patient stated agreement and understanding of this plan of care.    Disposition:  Home in stable condition with ER precautions.    Voice Recognition Disclaimer:  Portions of this document were created using voice recognition software and [employer] technology provided by [facility]. The software does have a chance of producing errors of grammar and possibly content. I have made every reasonable attempt to correct obvious errors, but there may be errors of grammar and possibly content that I did not discover before finalizing the  documentation.    RUDI Ralph.

## 2025-02-19 ENCOUNTER — OFFICE VISIT (OUTPATIENT)
Dept: MEDICAL GROUP | Facility: IMAGING CENTER | Age: 45
End: 2025-02-19
Payer: COMMERCIAL

## 2025-02-19 ENCOUNTER — HOSPITAL ENCOUNTER (OUTPATIENT)
Facility: MEDICAL CENTER | Age: 45
End: 2025-02-19
Attending: STUDENT IN AN ORGANIZED HEALTH CARE EDUCATION/TRAINING PROGRAM
Payer: COMMERCIAL

## 2025-02-19 ENCOUNTER — RESULTS FOLLOW-UP (OUTPATIENT)
Dept: MEDICAL GROUP | Facility: IMAGING CENTER | Age: 45
End: 2025-02-19

## 2025-02-19 VITALS
HEART RATE: 82 BPM | DIASTOLIC BLOOD PRESSURE: 60 MMHG | TEMPERATURE: 97.8 F | HEIGHT: 62 IN | BODY MASS INDEX: 37.32 KG/M2 | OXYGEN SATURATION: 98 % | WEIGHT: 202.8 LBS | SYSTOLIC BLOOD PRESSURE: 110 MMHG | RESPIRATION RATE: 14 BRPM

## 2025-02-19 DIAGNOSIS — Z11.59 NEED FOR HEPATITIS C SCREENING TEST: ICD-10-CM

## 2025-02-19 DIAGNOSIS — R53.83 OTHER FATIGUE: ICD-10-CM

## 2025-02-19 DIAGNOSIS — Z12.31 ENCOUNTER FOR SCREENING MAMMOGRAM FOR BREAST CANCER: ICD-10-CM

## 2025-02-19 DIAGNOSIS — E11.9 TYPE 2 DIABETES MELLITUS WITHOUT COMPLICATION, WITHOUT LONG-TERM CURRENT USE OF INSULIN (HCC): ICD-10-CM

## 2025-02-19 DIAGNOSIS — A04.8 H. PYLORI INFECTION: ICD-10-CM

## 2025-02-19 DIAGNOSIS — Z11.4 SCREENING FOR HIV (HUMAN IMMUNODEFICIENCY VIRUS): ICD-10-CM

## 2025-02-19 DIAGNOSIS — R19.8 ALTERNATING CONSTIPATION AND DIARRHEA: ICD-10-CM

## 2025-02-19 LAB
APPEARANCE UR: CLEAR
BILIRUB UR STRIP-MCNC: NEGATIVE MG/DL
COLOR UR AUTO: YELLOW
GLUCOSE UR STRIP.AUTO-MCNC: NEGATIVE MG/DL
KETONES UR STRIP.AUTO-MCNC: NEGATIVE MG/DL
LEUKOCYTE ESTERASE UR QL STRIP.AUTO: NORMAL
NITRITE UR QL STRIP.AUTO: NEGATIVE
PH UR STRIP.AUTO: 5.5 [PH] (ref 5–8)
PROT UR QL STRIP: NEGATIVE MG/DL
RBC UR QL AUTO: NEGATIVE
SP GR UR STRIP.AUTO: 1.01
UROBILINOGEN UR STRIP-MCNC: NORMAL MG/DL

## 2025-02-19 PROCEDURE — 3074F SYST BP LT 130 MM HG: CPT | Performed by: STUDENT IN AN ORGANIZED HEALTH CARE EDUCATION/TRAINING PROGRAM

## 2025-02-19 PROCEDURE — 82043 UR ALBUMIN QUANTITATIVE: CPT

## 2025-02-19 PROCEDURE — 82570 ASSAY OF URINE CREATININE: CPT

## 2025-02-19 PROCEDURE — 81002 URINALYSIS NONAUTO W/O SCOPE: CPT | Performed by: STUDENT IN AN ORGANIZED HEALTH CARE EDUCATION/TRAINING PROGRAM

## 2025-02-19 PROCEDURE — 99214 OFFICE O/P EST MOD 30 MIN: CPT | Performed by: STUDENT IN AN ORGANIZED HEALTH CARE EDUCATION/TRAINING PROGRAM

## 2025-02-19 PROCEDURE — 3078F DIAST BP <80 MM HG: CPT | Performed by: STUDENT IN AN ORGANIZED HEALTH CARE EDUCATION/TRAINING PROGRAM

## 2025-02-19 ASSESSMENT — PATIENT HEALTH QUESTIONNAIRE - PHQ9: CLINICAL INTERPRETATION OF PHQ2 SCORE: 0

## 2025-02-19 ASSESSMENT — FIBROSIS 4 INDEX: FIB4 SCORE: 0.99

## 2025-02-19 NOTE — PROGRESS NOTES
"Subjective:     CC:   Chief Complaint   Patient presents with    Other     2/14 very tired, headaches, chills, abdominal pain, constipated. 2/16 diahrrea started. Urgent care 2/17 tested negative for viral.        HPI:     Verbal consent was acquired by the patient to use [employer] ambient listening note generation during this visit Yes      ynes Dupree, 44 y.o., female,  presents today to discuss:     History of Present Illness    She reports experiencing an epigastric burning sensation during meals, irrespective of the type of food consumed. Additionally, she describes symptoms of chills, dizziness, fatigue, and excessive daytime somnolence. Her bowel movements have been irregular, with alternating episodes of constipation and diarrhea. She has not completed the previously ordered stool studies due to financial constraints. The patient does not use proton pump inhibitors or H2 receptor antagonists such as omeprazole or famotidine. She has missed three days of work due to her illness and is contemplating taking a leave of absence. During her visit to urgent care, no medications were prescribed. She reports no cough but is uncertain about the presence of fever.            ROS:  See HPI    Medications, allergies, past medical history, family history, surgical history, and social history documented in chart and reviewed by me.       Objective:   Exam:  /60 (BP Location: Right arm, Patient Position: Sitting, BP Cuff Size: Adult)   Pulse 82   Temp 36.6 °C (97.8 °F) (Temporal)   Resp 14   Ht 1.575 m (5' 2\")   Wt 92 kg (202 lb 12.8 oz)   SpO2 98%   BMI 37.09 kg/m²      General: In no acute distress. Normal appearance.   Head:   Atraumatic, normocephalic.   Neck: Supple without lymphadenopathy.   Pulmonary: Normal effort, clear to auscultation bilaterally, no wheezes, rhonchi, or rales  Cardiovascular:   Regular rate and rhythm. No murmurs, rubs, or gallops.  Musculoskeletal:  Normal ROM. No " edema.    Skin: No visible rashes or lesions.  Neurological: Alert and oriented to person, place, and time. Gait normal.   Psychiatric: Normal mood and affect. Calm and friendly behavior. Speech clear. Normal judgement and insight.         Assessment & Plan:       Assessment & Plan    1. H. pylori infection  Patient was recently treated for H. pylori.  Patient did not repeat H. pylori test to check for eradication.  Since she has multiple H. pylori symptoms, recommend repeating H. pylori test.  - H. PYLORI BREATH TEST    2. Other fatigue  Chronic, unstable.  Will repeat the screening labs to rule out metabolic disorder, thyroid disorder, anemia, iron deficiency, vitamin D deficiency, B12 deficiency, and folic acid deficiency.  CRP, SUZETTE, and sed rate were also ordered to rule out chronic inflammation or signs of autoimmune disorders.  - HEMOGLOBIN A1C; Future  - TSH WITH REFLEX TO FT4; Future  - Comp Metabolic Panel; Future  - CBC WITH DIFFERENTIAL; Future  - VITAMIN D 25-HYDROXY  - IRON/TOTAL IRON BIND; Future  - FERRITIN; Future  - VIT B12,  FOLIC ACID  - Sed Rate; Future  - CRP QUANTITIVE (NON-CARDIAC); Future  - SUZETTE W/REFLEX IF POSITIVE    3. Alternating constipation and diarrhea  Chronic and intermittent.  Recommend over-the-counter probiotic and a fiber supplement like Metamucil.  She may take MiraLAX or other over-the-counter stool softener as needed during constipation.  If diarrhea persists recommend stool studies and referral to GI.    4. Type 2 diabetes mellitus without complication, without long-term current use of insulin (HCC)  Chronic, established condition.  Stable without medications.  Will update A1c.  Will check microalbumin to creatinine ratio.  POCT urinalysis is unremarkable.  - HEMOGLOBIN A1C; Future  - TSH WITH REFLEX TO FT4; Future  - Comp Metabolic Panel; Future  - CBC WITH DIFFERENTIAL; Future  - Lipid Profile; Future  - MICROALBUMIN CREAT RATIO URINE; Future  - POCT Urinalysis    5. Need  for hepatitis C screening test  - HEP C VIRUS ANTIBODY; Future    6. Screening for HIV (human immunodeficiency virus)  - HIV AG/AB COMBO ASSAY SCREENING; Future    7. Encounter for screening mammogram for breast cancer  - MA-SCREENING MAMMO BILAT W/TOMOSYNTHESIS W/CAD; Future          Diagnosis and treatment plan explained to pt.  Pt agreed with treatment plan and verbalized understanding.     Return in about 1 week (around 2/26/2025) for Lab Results.     Please note that this dictation was created using voice recognition software. I have made every reasonable attempt to correct obvious errors, but I expect that there are errors of grammar and possibly content that I did not discover before finalizing the note.    Tiana Asher PA-C  [facility]

## 2025-02-20 ENCOUNTER — HOSPITAL ENCOUNTER (OUTPATIENT)
Dept: LAB | Facility: MEDICAL CENTER | Age: 45
End: 2025-02-20
Attending: STUDENT IN AN ORGANIZED HEALTH CARE EDUCATION/TRAINING PROGRAM
Payer: COMMERCIAL

## 2025-02-20 DIAGNOSIS — Z11.4 SCREENING FOR HIV (HUMAN IMMUNODEFICIENCY VIRUS): ICD-10-CM

## 2025-02-20 DIAGNOSIS — E11.9 TYPE 2 DIABETES MELLITUS WITHOUT COMPLICATION, WITHOUT LONG-TERM CURRENT USE OF INSULIN (HCC): ICD-10-CM

## 2025-02-20 DIAGNOSIS — R53.83 OTHER FATIGUE: ICD-10-CM

## 2025-02-20 DIAGNOSIS — A04.8 H. PYLORI INFECTION: ICD-10-CM

## 2025-02-20 DIAGNOSIS — Z11.59 NEED FOR HEPATITIS C SCREENING TEST: ICD-10-CM

## 2025-02-20 LAB
ALBUMIN SERPL BCP-MCNC: 3.8 G/DL (ref 3.2–4.9)
ALBUMIN/GLOB SERPL: 1.1 G/DL
ALP SERPL-CCNC: 99 U/L (ref 30–99)
ALT SERPL-CCNC: 20 U/L (ref 2–50)
ANION GAP SERPL CALC-SCNC: 9 MMOL/L (ref 7–16)
AST SERPL-CCNC: 23 U/L (ref 12–45)
BASOPHILS # BLD AUTO: 0.7 % (ref 0–1.8)
BASOPHILS # BLD: 0.05 K/UL (ref 0–0.12)
BILIRUB SERPL-MCNC: 0.4 MG/DL (ref 0.1–1.5)
BUN SERPL-MCNC: 12 MG/DL (ref 8–22)
CALCIUM ALBUM COR SERPL-MCNC: 8.8 MG/DL (ref 8.5–10.5)
CALCIUM SERPL-MCNC: 8.6 MG/DL (ref 8.5–10.5)
CHLORIDE SERPL-SCNC: 106 MMOL/L (ref 96–112)
CHOLEST SERPL-MCNC: 186 MG/DL (ref 100–199)
CO2 SERPL-SCNC: 24 MMOL/L (ref 20–33)
CREAT SERPL-MCNC: 0.71 MG/DL (ref 0.5–1.4)
CREAT UR-MCNC: 92 MG/DL
CRP SERPL HS-MCNC: 1.08 MG/DL (ref 0–0.75)
EOSINOPHIL # BLD AUTO: 0.06 K/UL (ref 0–0.51)
EOSINOPHIL NFR BLD: 0.8 % (ref 0–6.9)
ERYTHROCYTE [DISTWIDTH] IN BLOOD BY AUTOMATED COUNT: 42.6 FL (ref 35.9–50)
ERYTHROCYTE [SEDIMENTATION RATE] IN BLOOD BY WESTERGREN METHOD: 22 MM/HOUR (ref 0–25)
FERRITIN SERPL-MCNC: 34.3 NG/ML (ref 10–291)
FOLATE SERPL-MCNC: 18.7 NG/ML
GFR SERPLBLD CREATININE-BSD FMLA CKD-EPI: 108 ML/MIN/1.73 M 2
GLOBULIN SER CALC-MCNC: 3.5 G/DL (ref 1.9–3.5)
GLUCOSE SERPL-MCNC: 128 MG/DL (ref 65–99)
HCT VFR BLD AUTO: 41.6 % (ref 37–47)
HCV AB SER QL: NORMAL
HDLC SERPL-MCNC: 50 MG/DL
HGB BLD-MCNC: 13.5 G/DL (ref 12–16)
HIV 1+2 AB+HIV1 P24 AG SERPL QL IA: NORMAL
IMM GRANULOCYTES # BLD AUTO: 0.03 K/UL (ref 0–0.11)
IMM GRANULOCYTES NFR BLD AUTO: 0.4 % (ref 0–0.9)
IRON SATN MFR SERPL: 21 % (ref 15–55)
IRON SERPL-MCNC: 73 UG/DL (ref 40–170)
LDLC SERPL CALC-MCNC: 115 MG/DL
LYMPHOCYTES # BLD AUTO: 2.01 K/UL (ref 1–4.8)
LYMPHOCYTES NFR BLD: 28.2 % (ref 22–41)
MCH RBC QN AUTO: 27.3 PG (ref 27–33)
MCHC RBC AUTO-ENTMCNC: 32.5 G/DL (ref 32.2–35.5)
MCV RBC AUTO: 84 FL (ref 81.4–97.8)
MICROALBUMIN UR-MCNC: <1.2 MG/DL
MICROALBUMIN/CREAT UR: NORMAL MG/G (ref 0–30)
MONOCYTES # BLD AUTO: 0.46 K/UL (ref 0–0.85)
MONOCYTES NFR BLD AUTO: 6.4 % (ref 0–13.4)
NEUTROPHILS # BLD AUTO: 4.53 K/UL (ref 1.82–7.42)
NEUTROPHILS NFR BLD: 63.5 % (ref 44–72)
NRBC # BLD AUTO: 0 K/UL
NRBC BLD-RTO: 0 /100 WBC (ref 0–0.2)
PLATELET # BLD AUTO: 228 K/UL (ref 164–446)
PMV BLD AUTO: 12.4 FL (ref 9–12.9)
POTASSIUM SERPL-SCNC: 4 MMOL/L (ref 3.6–5.5)
PROT SERPL-MCNC: 7.3 G/DL (ref 6–8.2)
RBC # BLD AUTO: 4.95 M/UL (ref 4.2–5.4)
SODIUM SERPL-SCNC: 139 MMOL/L (ref 135–145)
TIBC SERPL-MCNC: 350 UG/DL (ref 250–450)
TRIGL SERPL-MCNC: 103 MG/DL (ref 0–149)
TSH SERPL DL<=0.005 MIU/L-ACNC: 0.79 UIU/ML (ref 0.38–5.33)
UIBC SERPL-MCNC: 277 UG/DL (ref 110–370)
UREA BREATH TEST QL: POSITIVE
VIT B12 SERPL-MCNC: 647 PG/ML (ref 211–911)
WBC # BLD AUTO: 7.1 K/UL (ref 4.8–10.8)

## 2025-02-20 PROCEDURE — 82607 VITAMIN B-12: CPT

## 2025-02-20 PROCEDURE — 86803 HEPATITIS C AB TEST: CPT

## 2025-02-20 PROCEDURE — 83036 HEMOGLOBIN GLYCOSYLATED A1C: CPT

## 2025-02-20 PROCEDURE — 82746 ASSAY OF FOLIC ACID SERUM: CPT

## 2025-02-20 PROCEDURE — 85025 COMPLETE CBC W/AUTO DIFF WBC: CPT

## 2025-02-20 PROCEDURE — 84443 ASSAY THYROID STIM HORMONE: CPT

## 2025-02-20 PROCEDURE — 83550 IRON BINDING TEST: CPT

## 2025-02-20 PROCEDURE — 36415 COLL VENOUS BLD VENIPUNCTURE: CPT

## 2025-02-20 PROCEDURE — 87389 HIV-1 AG W/HIV-1&-2 AB AG IA: CPT

## 2025-02-20 PROCEDURE — 83013 H PYLORI (C-13) BREATH: CPT

## 2025-02-20 PROCEDURE — 82728 ASSAY OF FERRITIN: CPT

## 2025-02-20 PROCEDURE — 80061 LIPID PANEL: CPT

## 2025-02-20 PROCEDURE — 80053 COMPREHEN METABOLIC PANEL: CPT

## 2025-02-20 PROCEDURE — 86140 C-REACTIVE PROTEIN: CPT

## 2025-02-20 PROCEDURE — 83540 ASSAY OF IRON: CPT

## 2025-02-20 PROCEDURE — 85652 RBC SED RATE AUTOMATED: CPT

## 2025-02-20 PROCEDURE — 86038 ANTINUCLEAR ANTIBODIES: CPT

## 2025-02-20 RX ORDER — OMEPRAZOLE 20 MG/1
20 CAPSULE, DELAYED RELEASE ORAL 2 TIMES DAILY
Qty: 28 CAPSULE | Refills: 0 | Status: SHIPPED | OUTPATIENT
Start: 2025-02-20 | End: 2025-03-06

## 2025-02-20 RX ORDER — METRONIDAZOLE 500 MG/1
500 TABLET ORAL
Qty: 56 TABLET | Refills: 0 | Status: SHIPPED | OUTPATIENT
Start: 2025-02-20 | End: 2025-03-06

## 2025-02-20 RX ORDER — TETRACYCLINE HYDROCHLORIDE 500 MG/1
500 CAPSULE ORAL 4 TIMES DAILY
Qty: 56 CAPSULE | Refills: 0 | Status: SHIPPED | OUTPATIENT
Start: 2025-02-20 | End: 2025-03-06

## 2025-02-21 LAB
EST. AVERAGE GLUCOSE BLD GHB EST-MCNC: 140 MG/DL
HBA1C MFR BLD: 6.5 % (ref 4–5.6)

## 2025-02-22 LAB — NUCLEAR IGG SER QL IA: NORMAL

## 2025-02-24 ENCOUNTER — OFFICE VISIT (OUTPATIENT)
Dept: MEDICAL GROUP | Facility: IMAGING CENTER | Age: 45
End: 2025-02-24
Payer: COMMERCIAL

## 2025-02-24 VITALS
HEART RATE: 90 BPM | RESPIRATION RATE: 14 BRPM | SYSTOLIC BLOOD PRESSURE: 110 MMHG | BODY MASS INDEX: 37.39 KG/M2 | DIASTOLIC BLOOD PRESSURE: 60 MMHG | TEMPERATURE: 97.6 F | HEIGHT: 62 IN | OXYGEN SATURATION: 98 % | WEIGHT: 203.2 LBS

## 2025-02-24 DIAGNOSIS — K59.00 CONSTIPATION, UNSPECIFIED CONSTIPATION TYPE: ICD-10-CM

## 2025-02-24 DIAGNOSIS — R79.82 CRP ELEVATED: ICD-10-CM

## 2025-02-24 DIAGNOSIS — R53.83 OTHER FATIGUE: ICD-10-CM

## 2025-02-24 DIAGNOSIS — E11.9 TYPE 2 DIABETES MELLITUS WITHOUT COMPLICATION, WITHOUT LONG-TERM CURRENT USE OF INSULIN (HCC): ICD-10-CM

## 2025-02-24 DIAGNOSIS — A04.8 H. PYLORI INFECTION: ICD-10-CM

## 2025-02-24 PROBLEM — E61.1 IRON DEFICIENCY: Status: RESOLVED | Noted: 2024-03-27 | Resolved: 2025-02-24

## 2025-02-24 PROBLEM — R79.89 ELEVATED VITAMIN B12 LEVEL: Status: RESOLVED | Noted: 2024-03-27 | Resolved: 2025-02-24

## 2025-02-24 PROCEDURE — 99214 OFFICE O/P EST MOD 30 MIN: CPT | Performed by: STUDENT IN AN ORGANIZED HEALTH CARE EDUCATION/TRAINING PROGRAM

## 2025-02-24 PROCEDURE — 3078F DIAST BP <80 MM HG: CPT | Performed by: STUDENT IN AN ORGANIZED HEALTH CARE EDUCATION/TRAINING PROGRAM

## 2025-02-24 PROCEDURE — 3074F SYST BP LT 130 MM HG: CPT | Performed by: STUDENT IN AN ORGANIZED HEALTH CARE EDUCATION/TRAINING PROGRAM

## 2025-02-24 ASSESSMENT — FIBROSIS 4 INDEX: FIB4 SCORE: 0.97

## 2025-02-24 NOTE — PROGRESS NOTES
"Subjective:     CC:   Chief Complaint   Patient presents with    Lab Results     2/2 labs        HPI:     Verbal consent was acquired by the patient to use [employer] ambient listening note generation during this visit Yes      ynes Dupree, 44 y.o., female,  presents today to discuss:     History of Present Illness    Constipation f/u  The patient reports experiencing constipation, which she manages by increasing her water intake. She denies any recent episodes of diarrhea.      H pylori  For her H. pylori infection, she has been compliant with 3 out of the 4 prescribed medications, specifically metronidazole, tetracycline, and omeprazole. She is currently taking probiotics, consuming 2 pills daily, and has incorporated probiotic-rich yogurt into her diet. She has eliminated dairy products from her diet due to adverse interactions with her medications.  She experiences abdominal pain and radiates to her back.          ROS:  See HPI    Medications, allergies, past medical history, family history, surgical history, and social history documented in chart and reviewed by me.       Objective:   Exam:  /60 (BP Location: Left arm, Patient Position: Sitting, BP Cuff Size: Large adult)   Pulse 90   Temp 36.4 °C (97.6 °F) (Temporal)   Resp 14   Ht 1.575 m (5' 2\")   Wt 92.2 kg (203 lb 3.2 oz)   LMP 02/22/2025 (Exact Date)   SpO2 98%   BMI 37.17 kg/m²      Physical Exam  Constitutional:       General: She is not in acute distress.     Appearance: Normal appearance.   HENT:      Head: Normocephalic and atraumatic.   Eyes:      General: No scleral icterus.  Neck:      Thyroid: No thyroid mass or thyromegaly.      Vascular: No carotid bruit.   Cardiovascular:      Rate and Rhythm: Normal rate and regular rhythm.      Pulses:           Dorsalis pedis pulses are 2+ on the right side and 2+ on the left side.        Posterior tibial pulses are 2+ on the right side and 2+ on the left side.      Heart sounds: " Normal heart sounds. No murmur heard.  Pulmonary:      Effort: Pulmonary effort is normal.      Breath sounds: Normal breath sounds. No wheezing.   Abdominal:      General: Bowel sounds are normal. There is no distension.      Palpations: Abdomen is soft. There is no mass.      Tenderness: There is no abdominal tenderness.   Musculoskeletal:         General: No swelling. Normal range of motion.      Cervical back: Neck supple.      Right lower leg: No edema.      Left lower leg: No edema.      Right foot: Normal. Normal range of motion. No swelling, deformity or tenderness. Normal pulse.      Left foot: Normal. Normal range of motion. No swelling, deformity or tenderness. Normal pulse.   Feet:      Right foot:      Protective Sensation: 10 sites tested.  10 sites sensed.      Skin integrity: Skin integrity normal.      Left foot:      Protective Sensation: 10 sites tested.  10 sites sensed.      Skin integrity: Skin integrity normal.   Lymphadenopathy:      Cervical: No cervical adenopathy.   Skin:     General: Skin is warm and dry.   Neurological:      General: No focal deficit present.      Mental Status: She is alert and oriented to person, place, and time.      Gait: Gait normal.   Psychiatric:         Mood and Affect: Mood normal.         Behavior: Behavior normal.         Thought Content: Thought content normal.         Judgment: Judgment normal.            Assessment & Plan:       Assessment & Plan       1. H. pylori infection  Recurrent.  Patient was previously treated with triple therapy.  She is advised to complete the prescribed treatment regimen for 2 weeks, followed by a 6-week waiting period before retesting. She should ensure that all household members are tested for H. pylori due to its transmissibility through saliva. A dental visit is recommended to check for H. pylori in the oral cavity. She should abstain from food, drink, and chewing gum for at least 4 hours prior to the H. pylori test and  discontinue omeprazole at least 2 weeks before testing to avoid false results. She has been prescribed metronidazole, tetracycline, omeprazole, and Pepto-Bismol tablets for the treatment of H. pylori. She is advised to check with the pharmacy for the missing Pepto-Bismol tablets. If the H. pylori infection persists despite treatment, imaging studies or a referral to a gastroenterologist may be considered.  - H. PYLORI BREATH TEST   Latest Reference Range & Units 02/20/25 07:17   H Pylori Breath Test Negative  Positive !   !: Data is abnormal    2. Other fatigue  Chronic, established condition.  Stable.  CBC, CMP, sed rate, SUZETTE, iron panel, ferritin, B12, folic acid, and TSH are unremarkable.  CRP is slightly elevated.  Vitamin D was not processed despite being ordered.  Recommend to check vitamin D levels.  Recommend sleep, nutrition, and exercise.  - VITAMIN D,25 HYDROXY (DEFICIENCY); Future    3. CRP elevated  Acute.  New finding.  CRP is slightly elevated.  Sed rate and SUZETTE are normal.  Will monitor.   Latest Reference Range & Units 02/20/25 07:17   Stat C-Reactive Protein 0.00 - 0.75 mg/dL 1.08 (H)   (H): Data is abnormally high    4. Constipation, unspecified constipation type  Chronic, stable.  Recommend over-the-counter MiraLAX daily, fiber supplement like Metamucil, high-fiber diet, and drinking more water.    5. Type 2 diabetes mellitus without complication, without long-term current use of insulin (HCC)  Chronic, established condition.  Well-controlled without medications.  A1c is 6.5.  Monofilament test is normal.  POCT retinal exam was conducted.  Recommend to repeat A1c in 6 months or sooner if indicated.  - Diabetic Monofilament LE Exam  - POCT Retinal Eye Exam    Latest Reference Range & Units 02/20/25 07:17   Glycohemoglobin 4.0 - 5.6 % 6.5 (H)   (H): Data is abnormally high      Diagnosis and treatment plan explained to pt. Counseled pt on new medication(s) and potential side effects. Pt agreed with  treatment plan and verbalized understanding.    Return in about 1 week (around 3/3/2025) for [facility] paperwork.     Please note that this dictation was created using voice recognition software. I have made every reasonable attempt to correct obvious errors, but I expect that there are errors of grammar and possibly content that I did not discover before finalizing the note.    Tiana Asher PA-C  [facility]

## 2025-02-26 ENCOUNTER — RESULTS FOLLOW-UP (OUTPATIENT)
Dept: MEDICAL GROUP | Facility: IMAGING CENTER | Age: 45
End: 2025-02-26
Payer: COMMERCIAL

## 2025-03-03 ENCOUNTER — APPOINTMENT (OUTPATIENT)
Dept: MEDICAL GROUP | Facility: IMAGING CENTER | Age: 45
End: 2025-03-03
Payer: COMMERCIAL

## 2025-03-03 VITALS
SYSTOLIC BLOOD PRESSURE: 112 MMHG | RESPIRATION RATE: 12 BRPM | OXYGEN SATURATION: 98 % | DIASTOLIC BLOOD PRESSURE: 60 MMHG | HEIGHT: 62 IN | BODY MASS INDEX: 37.76 KG/M2 | HEART RATE: 84 BPM | TEMPERATURE: 97.3 F | WEIGHT: 205.2 LBS

## 2025-03-03 DIAGNOSIS — R51.9 ACUTE NONINTRACTABLE HEADACHE, UNSPECIFIED HEADACHE TYPE: ICD-10-CM

## 2025-03-03 DIAGNOSIS — Z02.89 ENCOUNTER FOR COMPLETION OF FORM WITH PATIENT: ICD-10-CM

## 2025-03-03 DIAGNOSIS — A04.8 H. PYLORI INFECTION: ICD-10-CM

## 2025-03-03 DIAGNOSIS — R53.83 OTHER FATIGUE: ICD-10-CM

## 2025-03-03 PROCEDURE — 3078F DIAST BP <80 MM HG: CPT | Performed by: STUDENT IN AN ORGANIZED HEALTH CARE EDUCATION/TRAINING PROGRAM

## 2025-03-03 PROCEDURE — 3074F SYST BP LT 130 MM HG: CPT | Performed by: STUDENT IN AN ORGANIZED HEALTH CARE EDUCATION/TRAINING PROGRAM

## 2025-03-03 PROCEDURE — 99214 OFFICE O/P EST MOD 30 MIN: CPT | Performed by: STUDENT IN AN ORGANIZED HEALTH CARE EDUCATION/TRAINING PROGRAM

## 2025-03-03 ASSESSMENT — FIBROSIS 4 INDEX: FIB4 SCORE: 0.97

## 2025-03-03 NOTE — PROGRESS NOTES
"Subjective:     CC:   Chief Complaint   Patient presents with    Paperwork     Cindy ROBIN        HPI:     Verbal consent was acquired by the patient to use [employer] ambient listening note generation during this visit Yes      Mary Jenkinsostaynes, 44 y.o., female,  presents today to discuss:     History of Present Illness    H pylori/FMLA  She has been unable to obtain her bismuth subsalicylate (Pepto-Bismol) tablets due to a lack of prescription at the pharmacy. She is currently undergoing a 14-day course of medication, which she anticipates completing by Monday. She reports experiencing abdominal pain, dizziness, intense cephalalgia, and fatigue, which she attributes to her medication. Additionally, she experiences nausea and insomnia, characterized by frequent nocturnal awakenings. She has not undergone polysomnography. Her symptoms have significantly impacted her daily activities, including her ability to eat, due to a lack of energy. She occasionally experiences morning headaches but denies snoring or episodes of sleep apnea. She has been on medical leave since 02/12/2025 due to her symptoms, which began around 02/15/2025 or 02/16/2025. She is considering returning to work next week if her condition improves.  Despite engaging in regular physical activity, she has been unable to achieve weight loss and questions if this could be related to her H. pylori infection. She recalls a previous diagnosis of H. pylori in her mid-20s, for which she received treatment from a gastroenterologist.         ROS:  See HPI    Medications, allergies, past medical history, family history, surgical history, and social history documented in chart and reviewed by me.       Objective:   Exam:  /60 (BP Location: Left arm, Patient Position: Sitting, BP Cuff Size: Large adult)   Pulse 84   Temp 36.3 °C (97.3 °F) (Temporal)   Resp 12   Ht 1.575 m (5' 2\")   Wt 93.1 kg (205 lb 3.2 oz)   LMP 02/22/2025 (Exact Date)   SpO2 " 98%   BMI 37.53 kg/m²      General: In no acute distress. Normal appearance.   Head:   Atraumatic, normocephalic.   Neck: Supple without lymphadenopathy.   Pulmonary: Normal effort. No pulmonary distress.   Musculoskeletal:  Normal ROM. No edema.    Skin: No visible rashes or lesions.  Neurological: Alert and oriented to person, place, and time. Gait normal.   Psychiatric: Normal mood and affect. Calm and friendly behavior. Speech clear. Normal judgement and insight.         Assessment & Plan:       Assessment & Plan    1. Encounter for completion of form with patient  FMLA completed today as requested by pt. See copies under media.     2. H. pylori infection  Acute on chronic. Pt is currently being treated with quadruple therapy. Recommend to finish regimen, schedule a dental exam, have all household members be tested for H pylori, and to repeat H pylori test in 6 weeks to confirm eradication.   - Bismuth Subsalicylate 525 MG Tab; Take 1 Tablet by mouth 4 times a day for 14 days.  Dispense: 56 Tablet; Refill: 0  - HELICOBACTER PYLORI BREATH TEST; Future    3. Other fatigue  4. Acute nonintractable headache, unspecified headache type  Chronic, unstable. Unknown etiology. Labs have been unremarkable. Recommend sleep test to rule out CHRISTIAN.   - Referral to Pulmonary and Sleep Medicine          Pt agreed with treatment plan and verbalized understanding.     Return for as scheduled on 3/11/25 .     Please note that this dictation was created using voice recognition software. I have made every reasonable attempt to correct obvious errors, but I expect that there are errors of grammar and possibly content that I did not discover before finalizing the note.    Tiana Asher PA-C  [facility]

## 2025-03-06 NOTE — Clinical Note
REFERRAL APPROVAL NOTICE         Sent on March 6, 2025                   Mary Gold  [address]  [address]  [place] [id number]                   Dear Ms. Gold,    After a careful review of the medical information and benefit coverage, [facility] has processed your referral. See below for additional details.    If applicable, you must be actively enrolled with your insurance for coverage of the authorized service. If you have any questions regarding your coverage, please contact your insurance directly.    REFERRAL INFORMATION   Referral #:  [id number]  Referred-To Department    Referred-By Provider:  Pulmonary and Sleep Medicine    Tiana Asher P.A.-C.   Pulmonary/sleep [facility]      [address] [place] [id number] [phone] [address] [place] [address] [address]  [place] [id number] [phone]    Referral Start Date:  03/03/2025  Referral End Date:   03/03/2026           SCHEDULING  If you do not already have an appointment, please call [phone] to make an appointment.   MORE INFORMATION  As a reminder, [facility] ownership has changed, meaning this location is now owned and operated by [facility]. As such, we want to clarify that our patients should expect to receive two separate bills for the services received at [facility] - one representing the [facility] facility fees as the owner of the establishment, and the other to represent the physician's services and subsequent fees. You can speak with your insurance carrier for a pricing estimate by calling the customer service number on the back of your card and ask about charges for a hospital outpatient visit.  If you do not already have a [employer] account, sign up at: [email or web address]  You can access your medical information, make appointments, see lab results, billing information,  and more.  If you have questions regarding this referral, please contact  the [facility] department at:             [phone]. Monday - Friday 7:30AM - 5:00PM.      Sincerely,  [facility]

## 2025-03-10 ENCOUNTER — APPOINTMENT (OUTPATIENT)
Dept: MEDICAL GROUP | Facility: IMAGING CENTER | Age: 45
End: 2025-03-10
Payer: COMMERCIAL

## 2025-03-11 ENCOUNTER — OFFICE VISIT (OUTPATIENT)
Dept: MEDICAL GROUP | Facility: IMAGING CENTER | Age: 45
End: 2025-03-11
Payer: COMMERCIAL

## 2025-03-11 VITALS
HEART RATE: 74 BPM | OXYGEN SATURATION: 97 % | DIASTOLIC BLOOD PRESSURE: 60 MMHG | WEIGHT: 205 LBS | TEMPERATURE: 97.9 F | SYSTOLIC BLOOD PRESSURE: 110 MMHG | BODY MASS INDEX: 37.73 KG/M2 | HEIGHT: 62 IN | RESPIRATION RATE: 14 BRPM

## 2025-03-11 DIAGNOSIS — A04.8 H. PYLORI INFECTION: ICD-10-CM

## 2025-03-11 DIAGNOSIS — Z02.89 ENCOUNTER FOR COMPLETION OF FORM WITH PATIENT: ICD-10-CM

## 2025-03-11 PROCEDURE — 3078F DIAST BP <80 MM HG: CPT | Performed by: STUDENT IN AN ORGANIZED HEALTH CARE EDUCATION/TRAINING PROGRAM

## 2025-03-11 PROCEDURE — 3074F SYST BP LT 130 MM HG: CPT | Performed by: STUDENT IN AN ORGANIZED HEALTH CARE EDUCATION/TRAINING PROGRAM

## 2025-03-11 PROCEDURE — 99213 OFFICE O/P EST LOW 20 MIN: CPT | Performed by: STUDENT IN AN ORGANIZED HEALTH CARE EDUCATION/TRAINING PROGRAM

## 2025-03-11 ASSESSMENT — FIBROSIS 4 INDEX: FIB4 SCORE: 0.97

## 2025-03-11 NOTE — PROGRESS NOTES
"Subjective:     CC:   Chief Complaint   Patient presents with    Paperwork     FMLA       HPI:     Verbal consent was acquired by the patient to use [employer] ambient listening note generation during this visit Yes      Mary Goldynes, 44 y.o., female,  presents today to discuss:     History of Present Illness    Paperwork to return to work  She is currently on the final day of her treatment regimen for Helicobacter pylori, which has been associated with adverse effects including dizziness, abdominal pain, nausea, and cephalalgia. Despite these persistent symptoms, she expresses an intention to resume her occupational duties on Sunday, 03/16/2025. Her work schedule consists of 12-hour shifts, alternating between 3 and 4 days per week, and she has requested that no work restrictions be imposed. Additionally, she reports experiencing joint pain, dorsalgia, and generalized myalgia.       ROS:  See HPI    Medications, allergies, past medical history, family history, surgical history, and social history documented in chart and reviewed by me.       Objective:   Exam:  /60 (BP Location: Right arm, Patient Position: Sitting, BP Cuff Size: Adult)   Pulse 74   Temp 36.6 °C (97.9 °F) (Temporal)   Resp 14   Ht 1.575 m (5' 2\")   Wt 93 kg (205 lb)   LMP 02/22/2025 (Exact Date)   SpO2 97%   BMI 37.49 kg/m²      General: In no acute distress. Normal appearance.   Head:   Atraumatic, normocephalic.   Neck: Supple without lymphadenopathy.   Pulmonary: Normal effort, clear to auscultation bilaterally, no wheezes, rhonchi, or rales  Cardiovascular:   Regular rate and rhythm. No murmurs, rubs, or gallops.  Musculoskeletal:  Normal ROM. No edema.    Skin: No visible rashes or lesions.  Neurological: Alert and oriented to person, place, and time. Gait normal.   Psychiatric: Normal mood and affect. Calm and friendly behavior. Speech clear. Normal judgement and insight.         Assessment & Plan:       Assessment " & Plan      1. H. pylori infection  Recurrent issue. Uncontrolled.She is currently on the final day of her H. pylori treatment, which has been causing her to feel unwell. She reports persistent symptoms including dizziness, abdominal pain, nausea, and headaches. She will complete her H. pylori treatment today. A follow-up test will be conducted after a minimum of 6 weeks to ensure the eradication of the infection. She has been advised to abstain from omeprazole, pantoprazole, or Protonix for at least 2 weeks prior to the follow-up test. If symptoms do not resolve after eradication of H pylori.  Recommend to return to clinic to discuss further workup.    2. Encounter for completion of form with patient  Paperwork to return to work completed today per pt's request. See scanned copies in media.            Pt agreed with treatment plan and verbalized understanding.     Return for as scheduled 4/11/25 or sooner if needed.     Please note that this dictation was created using voice recognition software. I have made every reasonable attempt to correct obvious errors, but I expect that there are errors of grammar and possibly content that I did not discover before finalizing the note.    Tiana Asher PA-C  [facility]

## 2025-03-13 ENCOUNTER — APPOINTMENT (OUTPATIENT)
Dept: MEDICAL GROUP | Facility: IMAGING CENTER | Age: 45
End: 2025-03-13
Payer: COMMERCIAL

## 2025-03-26 ENCOUNTER — OFFICE VISIT (OUTPATIENT)
Dept: URGENT CARE | Facility: CLINIC | Age: 45
End: 2025-03-26
Payer: COMMERCIAL

## 2025-03-26 VITALS
TEMPERATURE: 97.4 F | OXYGEN SATURATION: 97 % | WEIGHT: 203.2 LBS | BODY MASS INDEX: 37.39 KG/M2 | RESPIRATION RATE: 20 BRPM | HEART RATE: 85 BPM | DIASTOLIC BLOOD PRESSURE: 88 MMHG | HEIGHT: 62 IN | SYSTOLIC BLOOD PRESSURE: 136 MMHG

## 2025-03-26 DIAGNOSIS — B34.9 VIRAL ILLNESS: ICD-10-CM

## 2025-03-26 DIAGNOSIS — R52 BODY ACHES: ICD-10-CM

## 2025-03-26 PROCEDURE — 3075F SYST BP GE 130 - 139MM HG: CPT

## 2025-03-26 PROCEDURE — 99213 OFFICE O/P EST LOW 20 MIN: CPT

## 2025-03-26 PROCEDURE — 3079F DIAST BP 80-89 MM HG: CPT

## 2025-03-26 PROCEDURE — 0241U POCT CEPHEID COV-2, FLU A/B, RSV - PCR: CPT

## 2025-03-26 ASSESSMENT — FIBROSIS 4 INDEX: FIB4 SCORE: 0.97

## 2025-03-26 ASSESSMENT — ENCOUNTER SYMPTOMS
DIZZINESS: 1
FEVER: 0
MYALGIAS: 1

## 2025-03-26 NOTE — LETTER
March 26, 2025    To Whom It May Concern:         This is confirmation that Mary Gold attended her scheduled appointment with ELIO Lopez on 3/26/25. Please excuse her from work 3/26/28-3/28/25.          If you have any questions please do not hesitate to call me at the phone number listed below.    Sincerely,          Clara Luna A.P.R.N.  [phone]

## 2025-03-27 NOTE — PROGRESS NOTES
Verbal consent was acquired by the patient to use [employer] ambient listening note generation during this visit   Subjective:   Mary Gold is a 44 y.o. female who presents for Dizziness (X 1 day Dizziness, body aches, sore throat)      HPI:  History of Present Illness  The patient is a 44-year-old female who presents for evaluation of dizziness, fatigue, and body aches.    She experienced an episode of dizziness and palpitations while at work yesterday. She reports extreme fatigue, throat discomfort and generalized body aches.  She continues to experience mild dizziness today, along with persistent fatigue and body aches. She reports no fever or cough but mentions a sensation of nasal congestion extending to her ear. She has been in contact with her sick toddler.She denies chest discomfort.        Review of Systems   Constitutional:  Positive for malaise/fatigue. Negative for fever.   HENT:  Positive for congestion.    Cardiovascular:  Negative for chest pain.   Musculoskeletal:  Positive for myalgias.   Neurological:  Positive for dizziness.       Medications:    Current Outpatient Medications on File Prior to Visit   Medication Sig Dispense Refill    Lactobacillus (PROBIOTIC ACIDOPHILUS PO) Take  by mouth. (Patient not taking: Reported on 3/26/2025)       No current facility-administered medications on file prior to visit.        Allergies:   Patient has no known allergies.    Problem List:   Patient Active Problem List   Diagnosis    Type 2 diabetes mellitus without complication, without long-term current use of insulin ([facility])    Obesity (BMI 30-39.9)    Fatigue    Hepatomegaly    Paresthesia of hand, bilateral    Neuropathy    Acute nonintractable headache    History of Chiari malformation    Nausea    Epigastric pain    H. pylori infection    Migraine without aura and without status migrainosus, not intractable    Constipation    CRP elevated        Surgical History:  Past Surgical History:   Procedure  "Laterality Date    CRANIECTOMY  8/17/2015    Procedure: SUBOCCIPITAL CRANIECTOMY ;  Surgeon: Tenzin Curtis M.D.;  Location: SURGERY [facility];  Service:     CERVICAL LAMINECTOMY POSTERIOR  8/17/2015    Procedure: CERVICAL LAMINECTOMY POSTERIOR C1, DUROPLASTY;  Surgeon: Tenzin Curtis M.D.;  Location: SURGERY [facility];  Service:     GYN SURGERY      OTHER      eye       Past Social Hx:   Social History     Tobacco Use    Smoking status: Never    Smokeless tobacco: Never   Vaping Use    Vaping status: Never Used   Substance Use Topics    Alcohol use: Not Currently    Drug use: No          Problem list, medications, and allergies reviewed by myself today in Epic.     Objective:     /88   Pulse 85   Temp 36.3 °C (97.4 °F) (Temporal)   Resp 20   Ht 1.575 m (5' 2\")   Wt 92.2 kg (203 lb 3.2 oz)   LMP 02/22/2025 (Exact Date)   SpO2 97%   BMI 37.17 kg/m²     Physical Exam  Vitals and nursing note reviewed.   Constitutional:       General: She is not in acute distress.     Appearance: Normal appearance. She is normal weight. She is not ill-appearing, toxic-appearing or diaphoretic.   HENT:      Head: Normocephalic and atraumatic.      Right Ear: Tympanic membrane, ear canal and external ear normal. There is no impacted cerumen.      Left Ear: Tympanic membrane, ear canal and external ear normal. There is no impacted cerumen.      Nose: Nose normal. No congestion or rhinorrhea.      Mouth/Throat:      Mouth: Mucous membranes are moist.      Pharynx: Oropharynx is clear. No oropharyngeal exudate or posterior oropharyngeal erythema.   Cardiovascular:      Rate and Rhythm: Normal rate and regular rhythm.      Pulses: Normal pulses.      Heart sounds: Normal heart sounds. No murmur heard.     No friction rub. No gallop.   Pulmonary:      Effort: Pulmonary effort is normal. No respiratory distress.      Breath sounds: Normal breath sounds. No stridor. No wheezing, rhonchi or rales.   Chest:      Chest " wall: No tenderness.   Musculoskeletal:      Cervical back: Neck supple. No tenderness.   Lymphadenopathy:      Cervical: No cervical adenopathy.   Skin:     General: Skin is warm and dry.      Capillary Refill: Capillary refill takes less than 2 seconds.   Neurological:      General: No focal deficit present.      Mental Status: She is alert and oriented to person, place, and time. Mental status is at baseline.      Cranial Nerves: No cranial nerve deficit.      Motor: No weakness.      Gait: Gait normal.   Psychiatric:         Mood and Affect: Mood normal.         Behavior: Behavior normal.         Thought Content: Thought content normal.         Judgment: Judgment normal.         Assessment/Plan:     Diagnosis and associated orders:   1. Body aches  POCT CoV-2, Flu A/B, RSV by PCR      2. Viral illness           Results for orders placed or performed in visit on 03/26/25   POCT CoV-2, Flu A/B, RSV by PCR    Collection Time: 03/26/25  8:45 PM   Result Value Ref Range    SARS-CoV-2 by PCR Negative Negative, Invalid    Influenza virus A RNA Negative Negative, Invalid    Influenza virus B, PCR Negative Negative, Invalid    RSV, PCR Negative Negative, Invalid          Comments/MDM:   Pt is clinically stable at today's acute urgent care visit.  No acute distress noted. Appropriate for outpatient management at this time.     Assessment & Plan  1. Suspected viral infection.  Her vital signs are within normal limits. The etiology of her symptoms is likely viral, potentially contracted from her child who recently exhibited similar symptoms. She is advised to rest and maintain adequate hydration over the next few days. Over-the-counter Flonase is recommended to alleviate nasal congestion and potential fluid accumulation, which may be contributing to her dizziness. A work note will be provided for her convenience. A viral swab will be conducted to rule out influenza. If her condition deteriorates, she should seek immediate  medical attention at the emergency room.            Discussed DDx, management options (risks,benefits, and alternatives to planned treatment), natural progression and supportive care.  Expressed understanding and the treatment plan was agreed upon. Questions were encouraged and answered   Return to urgent care prn if new or worsening sx or if there is no improvement in condition prn.    Educated in Red flags and indications to immediately call 911 or present to the Emergency Department.   Advised the patient to follow-up with the primary care physician for recheck, reevaluation, and consideration of further management.    I personally reviewed prior external notes and test results pertinent to today's visit.  I have independently reviewed and interpreted all diagnostics ordered during this urgent care acute visit.       Please note that this dictation was created using voice recognition software. I have made a reasonable attempt to correct obvious errors, but I expect that there are errors of grammar and possibly content that I did not discover before finalizing the note.    This note was electronically signed by KALE Sharpe

## 2025-04-01 ENCOUNTER — OFFICE VISIT (OUTPATIENT)
Dept: URGENT CARE | Facility: CLINIC | Age: 45
End: 2025-04-01
Payer: COMMERCIAL

## 2025-04-01 VITALS
HEIGHT: 62 IN | SYSTOLIC BLOOD PRESSURE: 106 MMHG | WEIGHT: 201.3 LBS | TEMPERATURE: 96.7 F | BODY MASS INDEX: 37.04 KG/M2 | OXYGEN SATURATION: 97 % | HEART RATE: 78 BPM | DIASTOLIC BLOOD PRESSURE: 68 MMHG | RESPIRATION RATE: 12 BRPM

## 2025-04-01 DIAGNOSIS — Z87.898 HISTORY OF EPIGASTRIC PAIN: ICD-10-CM

## 2025-04-01 DIAGNOSIS — R11.0 NAUSEA: ICD-10-CM

## 2025-04-01 DIAGNOSIS — R42 VERTIGO: ICD-10-CM

## 2025-04-01 LAB
APPEARANCE UR: CLEAR
BILIRUB UR STRIP-MCNC: NEGATIVE MG/DL
COLOR UR AUTO: YELLOW
GLUCOSE UR STRIP.AUTO-MCNC: NEGATIVE MG/DL
KETONES UR STRIP.AUTO-MCNC: NEGATIVE MG/DL
LEUKOCYTE ESTERASE UR QL STRIP.AUTO: NEGATIVE
NITRITE UR QL STRIP.AUTO: NEGATIVE
PH UR STRIP.AUTO: 6 [PH] (ref 5–8)
POCT INT CON NEG: NEGATIVE
POCT INT CON POS: POSITIVE
POCT URINE PREGNANCY TEST: NEGATIVE
PROT UR QL STRIP: NEGATIVE MG/DL
RBC UR QL AUTO: NEGATIVE
SP GR UR STRIP.AUTO: >=1.03
UROBILINOGEN UR STRIP-MCNC: NORMAL MG/DL

## 2025-04-01 PROCEDURE — 81025 URINE PREGNANCY TEST: CPT

## 2025-04-01 PROCEDURE — 99214 OFFICE O/P EST MOD 30 MIN: CPT

## 2025-04-01 PROCEDURE — 81002 URINALYSIS NONAUTO W/O SCOPE: CPT

## 2025-04-01 RX ORDER — CETIRIZINE HYDROCHLORIDE 10 MG/1
10 TABLET ORAL DAILY
Qty: 30 TABLET | Refills: 0 | Status: SHIPPED | OUTPATIENT
Start: 2025-04-01

## 2025-04-01 RX ORDER — FLUTICASONE PROPIONATE 50 MCG
1 SPRAY, SUSPENSION (ML) NASAL DAILY
Qty: 16 G | Refills: 0 | Status: SHIPPED | OUTPATIENT
Start: 2025-04-01

## 2025-04-01 RX ORDER — ONDANSETRON 4 MG/1
4 TABLET, ORALLY DISINTEGRATING ORAL EVERY 6 HOURS PRN
Qty: 15 TABLET | Refills: 0 | Status: SHIPPED | OUTPATIENT
Start: 2025-04-01

## 2025-04-01 ASSESSMENT — FIBROSIS 4 INDEX: FIB4 SCORE: 0.97

## 2025-04-01 NOTE — LETTER
April 1, 2025    To Whom It May Concern:         This is confirmation that Mary Jenkinsosta attended her scheduled appointment with ELIO Celeste on 4/01/25.         If you have any questions please do not hesitate to call me at the phone number listed below.    Sincerely,          RUDI Celeste.  [phone]

## 2025-04-02 NOTE — PROGRESS NOTES
"Chief Complaint   Patient presents with    Nausea     Stomach pain, nausea, dizziness since 3pm         Subjective:   HISTORY OF PRESENT ILLNESS: Mary Gold is a 44 y.o. female who presents for a spinning sensation when she laid down in her bed today.  The sensation has resolved mostly now but she can tell when she moves her head she still has slight dizziness.  Recent URI like syndrome last week.    She has reports she ate a piece of pork this afternoon and about 1 hour later had bloating and a burning sensation in her upper abdomin.  She just finished treatment for H.pylori.  she is doing repeat testing in 21 days to be sure it was irradiated.  This pain is now resolved.     Patient denies fever, vomiting, diarrhea. No bloody stools    Medications, Allergies, current problem list, Social and Family history reviewed today in Epic.     Objective:     /68 (BP Location: Right arm, Patient Position: Sitting, BP Cuff Size: Adult)   Pulse 78   Temp 35.9 °C (96.7 °F) (Temporal)   Resp 12   Ht 1.575 m (5' 2\")   Wt 91.3 kg (201 lb 4.8 oz)   SpO2 97%     Physical Exam  Vitals reviewed.   Constitutional:       Appearance: Normal appearance.   HENT:      Mouth/Throat:      Mouth: Mucous membranes are moist.   Cardiovascular:      Rate and Rhythm: Normal rate.   Pulmonary:      Effort: Pulmonary effort is normal.   Abdominal:      General: Abdomen is flat.      Palpations: Abdomen is soft.      Tenderness: There is abdominal tenderness in the epigastric area. There is no guarding or rebound. Negative signs include Mathew's sign.      Comments: Mild epigastric tenderness, no mass   Skin:     General: Skin is warm and dry.   Neurological:      General: No focal deficit present.      Mental Status: She is alert and oriented to person, place, and time.      Cranial Nerves: No cranial nerve deficit.      Sensory: No sensory deficit.      Motor: No weakness.      Coordination: Coordination normal.   Psychiatric:       "   Mood and Affect: Mood normal.          Assessment/Plan:     Diagnosis and associated orders    I personally reviewed prior external notes and test results pertinent to today's visit.     1. History of epigastric pain  POCT Urinalysis    POCT Pregnancy      2. Vertigo  cetirizine (ZYRTEC ALLERGY) 10 MG Tab    fluticasone (FLONASE) 50 MCG/ACT nasal spray      3. Nausea  ondansetron (ZOFRAN ODT) 4 MG TABLET DISPERSIBLE            IMPRESSION: The patient is well appearing here with reassuring exam and vitals signs. Symptomatology is consistent with vertigo and gastritis.  I will hold on Prilosec as she is retesting for pylori.  Her pain has actually resolved she may get away with eating bland diet until.  She has a left sided mid ear effusion likely causing her vertigo.  Has prescribed flonse and zyrtec.  No neuro deficits  Discussed anticipated duration of illness, return to work/school, and indications to RTC or go to the Emergency department.    Patient states understanding of the plan of care and discharge instructions.  They are discharged in stable condition.         Please note that this dictation was created using voice recognition software. I have made a reasonable attempt to correct obvious errors, but I expect that there are errors of grammar and possibly content that I did not discover before finalizing the note.    This note was electronically signed by ELIO Celeste

## 2025-04-06 ENCOUNTER — OFFICE VISIT (OUTPATIENT)
Dept: URGENT CARE | Facility: CLINIC | Age: 45
End: 2025-04-06
Payer: COMMERCIAL

## 2025-04-06 VITALS
TEMPERATURE: 97.1 F | SYSTOLIC BLOOD PRESSURE: 100 MMHG | HEIGHT: 62 IN | WEIGHT: 202.7 LBS | BODY MASS INDEX: 37.3 KG/M2 | HEART RATE: 88 BPM | DIASTOLIC BLOOD PRESSURE: 58 MMHG | RESPIRATION RATE: 14 BRPM | OXYGEN SATURATION: 98 %

## 2025-04-06 DIAGNOSIS — H66.001 NON-RECURRENT ACUTE SUPPURATIVE OTITIS MEDIA OF RIGHT EAR WITHOUT SPONTANEOUS RUPTURE OF TYMPANIC MEMBRANE: ICD-10-CM

## 2025-04-06 PROCEDURE — 99214 OFFICE O/P EST MOD 30 MIN: CPT | Performed by: PHYSICIAN ASSISTANT

## 2025-04-06 PROCEDURE — 3078F DIAST BP <80 MM HG: CPT | Performed by: PHYSICIAN ASSISTANT

## 2025-04-06 PROCEDURE — RXMED WILLOW AMBULATORY MEDICATION CHARGE: Performed by: PHYSICIAN ASSISTANT

## 2025-04-06 PROCEDURE — 3074F SYST BP LT 130 MM HG: CPT | Performed by: PHYSICIAN ASSISTANT

## 2025-04-06 RX ORDER — AMOXICILLIN 875 MG/1
875 TABLET, COATED ORAL 2 TIMES DAILY
Qty: 14 TABLET | Refills: 0 | Status: SHIPPED | OUTPATIENT
Start: 2025-04-06 | End: 2025-04-15

## 2025-04-06 ASSESSMENT — ENCOUNTER SYMPTOMS
HEADACHES: 0
BLURRED VISION: 0
CHILLS: 0
DOUBLE VISION: 0
DIZZINESS: 1
PALPITATIONS: 0
FEVER: 0

## 2025-04-06 ASSESSMENT — FIBROSIS 4 INDEX: FIB4 SCORE: 0.97

## 2025-04-06 NOTE — LETTER
April 6, 2025         Patient: Mary Gold   YOB: 1980   Date of Visit: 4/6/2025           To Whom it May Concern:    Mary Gold was seen in my clinic on 4/6/2025.  Please excuse her absence today.    If you have any questions or concerns, please don't hesitate to call.        Sincerely,           Inder Mcneil P.A.-C.  Electronically Signed

## 2025-04-07 NOTE — PROGRESS NOTES
Subjective:   Mary Gold is a 44 y.o. female who presents today with   Chief Complaint   Patient presents with    Ear Fullness     Patient is here today for ear fullness on left ear and states ringing in right ear. Patient states she feels dizzy/ loss of balance and fatigue.        Ear Fullness  This is a new problem. The current episode started 1 to 4 weeks ago. The problem occurs constantly. The problem has been unchanged. Pertinent negatives include no chest pain, chills, fever or headaches. She has tried nothing for the symptoms. The treatment provided no relief.       Patient states she has been dealing with some ear pressure and some slight dizziness.  She states she has been trying Flonase that was prescribed at her last visit.  Ear pressure has gotten slightly worse.  She has also been noticing some fatigue for a while now.  She states she has follow-up on the 13th of this month.    PMH:  has a past medical history of Chiari malformation, Diabetes (HCC), Dizziness, Fatigue, Heart burn, Indigestion, Numbness and tingling in hands, Other acute pain, and Unspecified hemorrhagic conditions.    She has no past medical history of COPD or Psychiatric disorder.  MEDS:   Current Outpatient Medications:     amoxicillin (AMOXIL) 875 MG tablet, Take 1 Tablet by mouth 2 times a day for 7 days., Disp: 14 Tablet, Rfl: 0    cetirizine (ZYRTEC ALLERGY) 10 MG Tab, Take 1 Tablet by mouth every day., Disp: 30 Tablet, Rfl: 0    fluticasone (FLONASE) 50 MCG/ACT nasal spray, Administer 1 Spray into affected nostril(S) every day., Disp: 16 g, Rfl: 0    ondansetron (ZOFRAN ODT) 4 MG TABLET DISPERSIBLE, Take 1 Tablet by mouth every 6 hours as needed for Nausea/Vomiting for up to 15 doses. (Patient not taking: Reported on 4/6/2025), Disp: 15 Tablet, Rfl: 0    Lactobacillus (PROBIOTIC ACIDOPHILUS PO), Take  by mouth. (Patient not taking: Reported on 4/6/2025), Disp: , Rfl:   ALLERGIES: No Known Allergies  SURGHX:   Past Surgical  "History:   Procedure Laterality Date    CRANIECTOMY  8/17/2015    Procedure: SUBOCCIPITAL CRANIECTOMY ;  Surgeon: Tenzin Curtis M.D.;  Location: SURGERY [facility];  Service:     CERVICAL LAMINECTOMY POSTERIOR  8/17/2015    Procedure: CERVICAL LAMINECTOMY POSTERIOR C1, DUROPLASTY;  Surgeon: Tenzin Curtis M.D.;  Location: SURGERY [facility];  Service:     GYN SURGERY      OTHER      eye     SOCHX:  reports that she has never smoked. She has never used smokeless tobacco. She reports that she does not currently use alcohol. She reports that she does not use drugs.  FH: Reviewed with patient, not pertinent to this visit.       Review of Systems   Constitutional:  Negative for chills and fever.   HENT:  Positive for ear pain.    Eyes:  Negative for blurred vision and double vision.   Cardiovascular:  Negative for chest pain and palpitations.   Neurological:  Positive for dizziness. Negative for headaches.      Objective:   /58   Pulse 88   Temp 36.2 °C (97.1 °F) (Temporal)   Resp 14   Ht 1.575 m (5' 2\")   Wt 91.9 kg (202 lb 11.2 oz)   SpO2 98%   BMI 37.07 kg/m²   Physical Exam  Vitals and nursing note reviewed.   Constitutional:       General: She is not in acute distress.     Appearance: Normal appearance. She is well-developed. She is not ill-appearing or toxic-appearing.   HENT:      Head: Normocephalic and atraumatic.      Right Ear: Hearing and ear canal normal. A middle ear effusion is present. Tympanic membrane is erythematous.      Left Ear: Hearing, tympanic membrane and ear canal normal.      Mouth/Throat:      Mouth: Mucous membranes are moist.      Pharynx: No oropharyngeal exudate or posterior oropharyngeal erythema.   Cardiovascular:      Rate and Rhythm: Normal rate and regular rhythm.      Heart sounds: Normal heart sounds.   Pulmonary:      Effort: Pulmonary effort is normal.      Breath sounds: Normal breath sounds.   Musculoskeletal:      Comments: Normal movement in all 4 " extremities   Skin:     General: Skin is warm and dry.   Neurological:      Mental Status: She is alert.      Coordination: Coordination normal.   Psychiatric:         Mood and Affect: Mood normal.       Assessment/Plan:   Assessment    1. Non-recurrent acute suppurative otitis media of right ear without spontaneous rupture of tympanic membrane  - amoxicillin (AMOXIL) 875 MG tablet; Take 1 Tablet by mouth 2 times a day for 7 days.  Dispense: 14 Tablet; Refill: 0      Symptoms and presentation appear consistent with right-sided ear infection we will treat accordingly with antibiotics.  Would recommend also continuing with Flonase as previously prescribed.  Offered to order blood work regarding fatigue and her symptoms but she states she will follow-up with her primary care doctor next week instead.    Differential diagnosis, natural history, supportive care, and indications for immediate follow-up discussed.   Patient given instructions and understanding of medications and treatment.    If not improving in 3-5 days, F/U with PCP or return to  if symptoms worsen.    Patient agreeable to plan.    Please note that this dictation was created using voice recognition software. I have made every reasonable attempt to correct obvious errors, but I expect that there are errors of grammar and possibly content that I did not discover before finalizing the note.    Inder Mcneil PA-C     yes

## 2025-04-08 ENCOUNTER — PHARMACY VISIT (OUTPATIENT)
Dept: PHARMACY | Facility: MEDICAL CENTER | Age: 45
End: 2025-04-08
Payer: MEDICARE

## 2025-04-09 ENCOUNTER — HOSPITAL ENCOUNTER (EMERGENCY)
Facility: MEDICAL CENTER | Age: 45
End: 2025-04-10
Attending: EMERGENCY MEDICINE
Payer: COMMERCIAL

## 2025-04-09 DIAGNOSIS — H81.391 PERIPHERAL VERTIGO INVOLVING RIGHT EAR: ICD-10-CM

## 2025-04-09 PROCEDURE — 99282 EMERGENCY DEPT VISIT SF MDM: CPT

## 2025-04-09 PROCEDURE — 700102 HCHG RX REV CODE 250 W/ 637 OVERRIDE(OP): Performed by: EMERGENCY MEDICINE

## 2025-04-09 PROCEDURE — A9270 NON-COVERED ITEM OR SERVICE: HCPCS | Performed by: EMERGENCY MEDICINE

## 2025-04-09 RX ORDER — MECLIZINE HYDROCHLORIDE 25 MG/1
25 TABLET ORAL ONCE
Status: COMPLETED | OUTPATIENT
Start: 2025-04-09 | End: 2025-04-09

## 2025-04-09 RX ADMIN — MECLIZINE HYDROCHLORIDE 25 MG: 25 TABLET ORAL at 23:46

## 2025-04-09 ASSESSMENT — FIBROSIS 4 INDEX: FIB4 SCORE: 0.97

## 2025-04-10 VITALS
BODY MASS INDEX: 37.32 KG/M2 | HEART RATE: 74 BPM | SYSTOLIC BLOOD PRESSURE: 120 MMHG | HEIGHT: 62 IN | RESPIRATION RATE: 18 BRPM | DIASTOLIC BLOOD PRESSURE: 66 MMHG | OXYGEN SATURATION: 98 % | TEMPERATURE: 97 F | WEIGHT: 202.82 LBS

## 2025-04-10 RX ORDER — MECLIZINE HYDROCHLORIDE 25 MG/1
25 TABLET ORAL 3 TIMES DAILY PRN
Qty: 30 TABLET | Refills: 0 | Status: SHIPPED | OUTPATIENT
Start: 2025-04-10

## 2025-04-10 NOTE — ED NOTES
Patient is stable for d/c at this time, anticipatory guidance provided, close follow-up encouraged, and ED return instructions have been detailed. Patient both agreeable to the disposition, and plan and discharged home in ambulatory state and good condition.    Rx education provided, pt verbalized understanding.

## 2025-04-10 NOTE — ED TRIAGE NOTES
"Chief Complaint   Patient presents with    Ear Pain     R side, c/o pain and dizziness; pt astates seen at  on Monday and given abx - states no relief     /66   Pulse 79   Temp 36.1 °C (97 °F) (Temporal)   Resp 18   Ht 1.575 m (5' 2\")   Wt 92 kg (202 lb 13.2 oz)   SpO2 97%   BMI 37.10 kg/m²       "

## 2025-04-10 NOTE — ED NOTES
Pt. C/o of dizziness/Ear pain. Pt stated that she was at Urgent on Monday and was given antibiotics and feels they are not helping.

## 2025-04-10 NOTE — ED PROVIDER NOTES
ED Provider Note    CHIEF COMPLAINT  Chief Complaint   Patient presents with    Ear Pain     R side, c/o pain and dizziness; pt brendabalwinder seen at  on Monday and given abx - states no relief       EXTERNAL RECORDS REVIEWED  Outpatient Notes patient was seen in urgent care twice for the past several weeks diagnosed with a viral illness then some vertigo and nausea and then a nonrecurrent ear infection as of 3 days ago and started on amoxicillin    HPI/ROS  LIMITATION TO HISTORY   Select: : None  OUTSIDE HISTORIAN(S):  jg Gold is a 44 y.o. female who presents to the emergency department stating that she is not having ear pain and she is just dizzy.  She states that she has got a lot of vertigo and it is not getting any better and the antibiotics do not seem to be helping but she states that her ears feel little full but they are not painful.  She thinks it is definitely worse on the right when she moves her head to that side.  No associate nausea or vomiting just dizzy.  No vision changes no weakness numbness or tingling no speech difficulty.    PAST MEDICAL HISTORY   has a past medical history of Chiari malformation, Diabetes (HCC), Dizziness, Fatigue, Heart burn, Indigestion, Numbness and tingling in hands, Other acute pain, and Unspecified hemorrhagic conditions.    SURGICAL HISTORY   has a past surgical history that includes other; craniectomy (8/17/2015); cervical laminectomy posterior (8/17/2015); and gyn surgery.    FAMILY HISTORY  Family History   Problem Relation Age of Onset    Diabetes Mother     Hypertension Mother     Diabetes Maternal Aunt        SOCIAL HISTORY  Social History     Tobacco Use    Smoking status: Never    Smokeless tobacco: Never   Vaping Use    Vaping status: Never Used   Substance and Sexual Activity    Alcohol use: Not Currently    Drug use: No    Sexual activity: Yes     Partners: Male     Comment: sterilization       CURRENT MEDICATIONS  Home Medications       Reviewed by  "Licha Jones R.N. (Registered Nurse) on 04/09/25 at 2300  Med List Status: Not Addressed     Medication Last Dose Status   amoxicillin (AMOXIL) 875 MG tablet  Active   cetirizine (ZYRTEC ALLERGY) 10 MG Tab  Active   fluticasone (FLONASE) 50 MCG/ACT nasal spray  Active   Lactobacillus (PROBIOTIC ACIDOPHILUS PO)  Active   ondansetron (ZOFRAN ODT) 4 MG TABLET DISPERSIBLE  Active                    ALLERGIES  No Known Allergies    PHYSICAL EXAM  VITAL SIGNS: /66   Pulse 79   Temp 36.1 °C (97 °F) (Temporal)   Resp 18   Ht 1.575 m (5' 2\")   Wt 92 kg (202 lb 13.2 oz)   SpO2 97%   BMI 37.10 kg/m²    Pulse OX: Pulse Oxygen level is within normal limits on room air  Constitutional: Alert in no apparent distress.  HENT: Normocephalic, Atraumatic, MMM, left tympanic membrane thin normal limits right tympanic membrane mild fluid without bulge or erythema  Eyes: Pupils round equal reactive to light. Conjunctiva normal, non-icteric.  Nystagmus to the right reproducible with head movement  Heart: Regular rate and rhythm, intact distal pulses   Lungs: Symmetrical movement, no resp distress   Abdomen: Non-tender, non-distended, normal bowel sounds  EXT/Back no edema  Skin: Warm, Dry, No erythema, No rash.   Neurologic: Alert and oriented, Symmetric smile, eyes shut tight bilaterally, forehead wrinkles bilaterally, sensation intact to light touch bilateral face, tongue midline, head turn and shoulder shrug with full strength. Hearing intact grossly bilaterally. 5/5 strength shoulder, elbow  b/l. 5/5 strength hip, knee, ankle b/l   SILT biceps, forearms, hands, SILT thighs, shins mid foot   NO pronator drift, negative romberg, no aphasia, no dysarthria, no gaze preference or visual deficit         COURSE & MEDICAL DECISION MAKING    ASSESSMENT, COURSE AND PLAN  Care Narrative:     Patient is 44-year-old female presents emerged department with peripheral vertigo NIH is 0 no neurologic findings been going on for " weeks with right-sided nystagmus which is reproducible with head movement which is positive hints exam.  The patient will be given meclizine and then reassess    We did attempt the Epley maneuver in the emergency department she states definitely made things worse at first with then potentially minimally improved symptoms      DISPOSITION AND DISCUSSIONS    12:13 AM.  I reassessed the patient at the bedside.  She states she feels minimally improved with the meclizine.  She will be referred to ENT given meclizine description at home with return precautions.  She understands feels comfortable with the plan      I have discussed management of the patient with the following physicians and DIGNA's:  none    Discussion of management with other QHP or appropriate source(s): None     Escalation of care considered, and ultimately not performed:Laboratory analysis and diagnostic imaging    Barriers to care at this time, including but not limited to:  na .     Decision tools and prescription drugs considered including, but not limited to: Medication modification meclizine .    The patient will return for new or worsening symptoms and is stable at the time of discharge.    The patient is referred to a primary physician for blood pressure management, diabetic screening, and for all other preventative health concerns.    DISPOSITION:  Patient will be discharged home in stable condition.    FOLLOW UP:  Tiana Asher P.A.-C.  [address] [place] [address] [phone]    Schedule an appointment as soon as possible for a visit       Rico Escalona M.D.  [address] [facility]  [place] [id number] [phone]            OUTPATIENT MEDICATIONS:  New Prescriptions    MECLIZINE (ANTIVERT) 25 MG TAB    Take 1 Tablet by mouth 3 times a day as needed for Vertigo or Dizziness.         FINAL DIAGNOSIS  1. Peripheral vertigo involving right ear         Electronically signed by: Michela Troy M.D., 4/9/2025 11:19 PM

## 2025-04-11 ENCOUNTER — APPOINTMENT (OUTPATIENT)
Dept: MEDICAL GROUP | Facility: IMAGING CENTER | Age: 45
End: 2025-04-11
Payer: COMMERCIAL

## 2025-04-16 NOTE — Clinical Note
REFERRAL APPROVAL NOTICE         Sent on April 16, 2025                   Mary Gold  [address]  [address]  [place] [address]                   Dear Ms. Gold,    After a careful review of the medical information and benefit coverage, [facility] has processed your referral. See below for additional details.    If applicable, you must be actively enrolled with your insurance for coverage of the authorized service. If you have any questions regarding your coverage, please contact your insurance directly.    REFERRAL INFORMATION   Referral #:  [id number]  Referred-To Provider    Referred-By Provider:  Otolaryngology    Michela Troy M.D.   [facility]      [address] [facility] [address]  [address]  [place] [address]  [phone] [address] [facility] [place] [address]  [phone]    Referral Start Date:  04/10/2025  Referral End Date:   04/10/2026             SCHEDULING  If you do not already have an appointment, please call [phone] to make an appointment.     MORE INFORMATION  If you do not already have a [employer] account, sign up at: [email or web address]  You can access your medical information, make appointments, see lab results, billing information, and more.  If you have questions regarding this referral, please contact  the [facility] Referrals department at:             [phone]. Monday - Friday 8:00AM - 5:00PM.     Sincerely,    [facility]

## 2025-05-09 ENCOUNTER — APPOINTMENT (OUTPATIENT)
Dept: MEDICAL GROUP | Facility: IMAGING CENTER | Age: 45
End: 2025-05-09
Payer: COMMERCIAL

## 2025-05-16 ENCOUNTER — HOSPITAL ENCOUNTER (OUTPATIENT)
Dept: LAB | Facility: MEDICAL CENTER | Age: 45
End: 2025-05-16
Attending: STUDENT IN AN ORGANIZED HEALTH CARE EDUCATION/TRAINING PROGRAM
Payer: COMMERCIAL

## 2025-05-16 DIAGNOSIS — R53.83 OTHER FATIGUE: ICD-10-CM

## 2025-05-16 DIAGNOSIS — A04.8 H. PYLORI INFECTION: ICD-10-CM

## 2025-05-16 PROCEDURE — 36415 COLL VENOUS BLD VENIPUNCTURE: CPT

## 2025-05-16 PROCEDURE — 82306 VITAMIN D 25 HYDROXY: CPT

## 2025-05-16 PROCEDURE — 83013 H PYLORI (C-13) BREATH: CPT

## 2025-05-17 LAB
25(OH)D3 SERPL-MCNC: 32 NG/ML (ref 30–100)
UREA BREATH TEST QL: NEGATIVE

## 2025-05-19 ENCOUNTER — RESULTS FOLLOW-UP (OUTPATIENT)
Dept: MEDICAL GROUP | Facility: IMAGING CENTER | Age: 45
End: 2025-05-19
Payer: COMMERCIAL

## 2025-05-27 ENCOUNTER — RESULTS FOLLOW-UP (OUTPATIENT)
Dept: MEDICAL GROUP | Facility: IMAGING CENTER | Age: 45
End: 2025-05-27
Payer: COMMERCIAL

## 2025-06-13 ENCOUNTER — APPOINTMENT (OUTPATIENT)
Dept: MEDICAL GROUP | Facility: IMAGING CENTER | Age: 45
End: 2025-06-13
Payer: COMMERCIAL

## 2025-06-26 ENCOUNTER — TELEPHONE (OUTPATIENT)
Dept: HEALTH INFORMATION MANAGEMENT | Facility: OTHER | Age: 45
End: 2025-06-26
Payer: COMMERCIAL

## 2025-08-07 ENCOUNTER — APPOINTMENT (OUTPATIENT)
Dept: MEDICAL GROUP | Facility: IMAGING CENTER | Age: 45
End: 2025-08-07
Payer: COMMERCIAL

## 2025-09-12 ENCOUNTER — APPOINTMENT (OUTPATIENT)
Dept: MEDICAL GROUP | Facility: IMAGING CENTER | Age: 45
End: 2025-09-12
Payer: COMMERCIAL